# Patient Record
Sex: MALE | Race: WHITE | NOT HISPANIC OR LATINO | Employment: OTHER | URBAN - METROPOLITAN AREA
[De-identification: names, ages, dates, MRNs, and addresses within clinical notes are randomized per-mention and may not be internally consistent; named-entity substitution may affect disease eponyms.]

---

## 2017-02-09 ENCOUNTER — GENERIC CONVERSION - ENCOUNTER (OUTPATIENT)
Dept: OTHER | Facility: OTHER | Age: 72
End: 2017-02-09

## 2017-03-23 ENCOUNTER — GENERIC CONVERSION - ENCOUNTER (OUTPATIENT)
Dept: OTHER | Facility: OTHER | Age: 72
End: 2017-03-23

## 2017-04-19 ENCOUNTER — ALLSCRIPTS OFFICE VISIT (OUTPATIENT)
Dept: OTHER | Facility: OTHER | Age: 72
End: 2017-04-19

## 2017-04-25 ENCOUNTER — ALLSCRIPTS OFFICE VISIT (OUTPATIENT)
Dept: OTHER | Facility: OTHER | Age: 72
End: 2017-04-25

## 2017-04-26 ENCOUNTER — GENERIC CONVERSION - ENCOUNTER (OUTPATIENT)
Dept: OTHER | Facility: OTHER | Age: 72
End: 2017-04-26

## 2017-06-20 ENCOUNTER — ALLSCRIPTS OFFICE VISIT (OUTPATIENT)
Dept: OTHER | Facility: OTHER | Age: 72
End: 2017-06-20

## 2017-09-08 ENCOUNTER — ALLSCRIPTS OFFICE VISIT (OUTPATIENT)
Dept: OTHER | Facility: OTHER | Age: 72
End: 2017-09-08

## 2017-09-19 ENCOUNTER — ALLSCRIPTS OFFICE VISIT (OUTPATIENT)
Dept: OTHER | Facility: OTHER | Age: 72
End: 2017-09-19

## 2017-10-03 ENCOUNTER — ALLSCRIPTS OFFICE VISIT (OUTPATIENT)
Dept: OTHER | Facility: OTHER | Age: 72
End: 2017-10-03

## 2017-10-10 ENCOUNTER — GENERIC CONVERSION - ENCOUNTER (OUTPATIENT)
Dept: OTHER | Facility: OTHER | Age: 72
End: 2017-10-10

## 2017-10-10 LAB
A/G RATIO (HISTORICAL): 2.1 (CALC) (ref 1–2.5)
ALBUMIN SERPL BCP-MCNC: 4.7 G/DL (ref 3.6–5.1)
ALP SERPL-CCNC: 63 U/L (ref 40–115)
ALT SERPL W P-5'-P-CCNC: 18 U/L (ref 9–46)
AST SERPL W P-5'-P-CCNC: 19 U/L (ref 10–35)
BASOPHILS # BLD AUTO: 1 %
BASOPHILS # BLD AUTO: 61 CELLS/UL (ref 0–200)
BILIRUB SERPL-MCNC: 0.4 MG/DL (ref 0.2–1.2)
BUN SERPL-MCNC: 20 MG/DL (ref 7–25)
BUN/CREA RATIO (HISTORICAL): NORMAL (CALC) (ref 6–22)
CALCIUM (ADJUSTED FOR ALBUMIN) (HISTORICAL): 9.9 MG/DL (CALC) (ref 8.6–10.2)
CALCIUM SERPL-MCNC: 10.1 MG/DL (ref 8.6–10.3)
CHLORIDE SERPL-SCNC: 103 MMOL/L (ref 98–110)
CO2 SERPL-SCNC: 30 MMOL/L (ref 20–31)
CREAT SERPL-MCNC: 1 MG/DL (ref 0.7–1.18)
DEPRECATED RDW RBC AUTO: 12.2 % (ref 11–15)
EGFR AFRICAN AMERICAN (HISTORICAL): 86 ML/MIN/1.73M2
EGFR-AMERICAN CALC (HISTORICAL): 74 ML/MIN/1.73M2
EOSINOPHIL # BLD AUTO: 238 CELLS/UL (ref 15–500)
EOSINOPHIL # BLD AUTO: 3.9 %
EST. AVERAGE GLUCOSE BLD GHB EST-MCNC: 111 (CALC)
EST. AVERAGE GLUCOSE BLD GHB EST-MCNC: 6.2 (CALC)
GAMMA GLOBULIN (HISTORICAL): 2.2 G/DL (CALC) (ref 1.9–3.7)
GLUCOSE (HISTORICAL): 82 MG/DL (ref 65–99)
HBA1C MFR BLD HPLC: 5.5 % OF TOTAL HGB
HCT VFR BLD AUTO: 45.4 % (ref 38.5–50)
HGB BLD-MCNC: 15.4 G/DL (ref 13.2–17.1)
LYMPHOCYTES # BLD AUTO: 1043 CELLS/UL (ref 850–3900)
LYMPHOCYTES # BLD AUTO: 17.1 %
MCH RBC QN AUTO: 33.6 PG (ref 27–33)
MCHC RBC AUTO-ENTMCNC: 33.9 G/DL (ref 32–36)
MCV RBC AUTO: 98.9 FL (ref 80–100)
MONOCYTES # BLD AUTO: 549 CELLS/UL (ref 200–950)
MONOCYTES (HISTORICAL): 9 %
NEUTROPHILS # BLD AUTO: 4209 CELLS/UL (ref 1500–7800)
NEUTROPHILS # BLD AUTO: 69 %
PLATELET # BLD AUTO: 288 THOUSAND/UL (ref 140–400)
PMV BLD AUTO: 9.7 FL (ref 7.5–12.5)
POTASSIUM SERPL-SCNC: 5.1 MMOL/L (ref 3.5–5.3)
RBC # BLD AUTO: 4.59 MILLION/UL (ref 4.2–5.8)
SODIUM SERPL-SCNC: 143 MMOL/L (ref 135–146)
TOTAL PROTEIN (HISTORICAL): 6.9 G/DL (ref 6.1–8.1)
TSH SERPL DL<=0.05 MIU/L-ACNC: 2.16 MIU/L (ref 0.4–4.5)
WBC # BLD AUTO: 6.1 THOUSAND/UL (ref 3.8–10.8)

## 2017-10-17 ENCOUNTER — ALLSCRIPTS OFFICE VISIT (OUTPATIENT)
Dept: OTHER | Facility: OTHER | Age: 72
End: 2017-10-17

## 2017-10-19 ENCOUNTER — ALLSCRIPTS OFFICE VISIT (OUTPATIENT)
Dept: OTHER | Facility: OTHER | Age: 72
End: 2017-10-19

## 2017-10-20 NOTE — PROGRESS NOTES
Assessment  1  Hypothyroidism (244 9) (E03 9)   2  OCD (obsessive compulsive disorder) (300 3) (F42 9)   3  Impaired fasting glucose (790 21) (R73 01)   4  Tension type headache (339 10) (G44 209)   5  Hyperlipidemia (272 4) (E78 5)    Plan  Anxiety    · ALPRAZolam 0 25 MG Oral Tablet; TAKE 1 TABLET EVERY 8 HOURS AS  NEEDED   · FLUoxetine HCl - 40 MG Oral Capsule; Take 1 capsule twice daily  Health Maintenance    · *VB - Fall Risk Assessment  (Dx Z13 89 Screen for Neurologic Disorder);  Status:Complete;   Done: 87OXX8097 02:57PM   · *VB-Depression Screening; Status:Complete;   Done: 76SCQ0558 02:57PM  Hyperlipidemia, Hypothyroidism, Impaired fasting glucose, OCD (obsessive compulsive  disorder), Tension type headache    · (Q) COMPREHENSIVE METABOLIC PNL W/ADJUSTED CALCIUM; Status:Active; Requested for:09Apr2018;    · (Q) HEMOGLOBIN A1c WITH eAG; Status:Active; Requested for:09Apr2018;    · (Q) LIPID PANEL WITH REFLEX TO DIRECT LDL; Status:Active; Requested  for:09Apr2018;    · (Q) TSH, 3RD GENERATION W/REFLEX TO FT4; Status:Active; Requested  for:09Apr2018;   Hypothyroidism    · Levothyroxine Sodium 75 MCG Oral Tablet; TAKE 1 TABLET DAILY  PMH: Other chronic pain    · Niacin 500 MG Oral Tablet; TAKE 2 TABLETS TWICE DAILY  Tension type headache    · Butalbital-APAP-Caffeine -40 MG Oral Tablet; TAKE 1 OR 2 TABLETS  EVERY 4 HOURS AS NEEDED FOR HEADACHE  DO NOT EXCEED 6 TABLETS IN  24 HOURS  Unlinked    · Lolly 0 5-0 4 MG Oral Capsule (Dutasteride-Tamsulosin HCl); take 1 capsule  daily    Discussion/Summary    Reviewed lab in 10/1394cdkyrs5 5 normalokokprozac to 40mg bid  FU therapist regularly  refills of xanax  SE educated pt  refill of fioricet  SE educated pt  Use as less as possible  specialist as scheduled  flu shot at [facility]  2010  Got [id number] in 9/2015  3/20/2013, repeat in 5 years  urology for PSA  in 6 months with labs       Possible side effects of new medications were reviewed with the patient/guardian today  The treatment plan was reviewed with the patient/guardian  The patient/guardian understands and agrees with the treatment plan      Chief Complaint  Patient presents for a 6 month follow up  History of Present Illness  Pt is here with wife  He is on prozac 40mg daily  Feels little help  FU therapist Q 2 weeks  Would like to try higher dose  helped  He is on xanax 4/week, not everyday  Need refill  headache---He is on firocet 3-4/week which helped  Need refill  hgA1C 5 5 normalniacin daily and diet control  neurology for memory loss/dementia regularly  Short memory loss is stable  Does not take any meds for it  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  Stable  urology Dr Mirta Ford for BPH  He is on [place] which helped  opthalmology yearly  with wife and son  Does all ADL's  Still drive  recent falls  depression  The patient is being seen for follow-up of hypothyroidism of undetermined etiology  The patient reports doing well  The patient is currently asymptomatic  Associated symptoms: no depression  Medications:  the patient is adherent to his medication regimen, but-- he denies medication side effects  Review of Systems    Constitutional: No fever or chills, feels well, no tiredness, no recent weight gain or weight loss  ENT: no complaints of earache, no hearing loss, no nosebleeds, no nasal discharge, no sore throat, no hoarseness  Cardiovascular: No complaints of slow heart rate, no fast heart rate, no chest pain, no palpitations, no leg claudication, no lower extremity  Respiratory: No complaints of shortness of breath, no wheezing, no cough, no SOB on exertion, no orthopnea or PND  Gastrointestinal: No complaints of abdominal pain, no constipation, no nausea or vomiting, no diarrhea or bloody stools  Musculoskeletal: No complaints of arthralgia, no myalgias, no joint swelling or stiffness, no limb pain or swelling  Preventive Quality 65 and Older:  The patient is currently asymptomatic Symptoms Include: no recent fall       Active Problems  1  Anxiety (300 00) (F41 9)   2  Benign prostatic hypertrophy (600 00) (N40 0)   3  Cataract (366 9) (H26 9)   4  Erectile dysfunction of non-organic origin (302 72) (F52 21)   5  Hearing loss (389 9) (H91 90)   6  Hyperlipidemia (272 4) (E78 5)   7  Hypothyroidism (244 9) (E03 9)   8  Impaired fasting glucose (790 21) (R73 01)   9  Macrocytosis (289 89) (D75 89)   10  Marital relationship problem (V61 10) (Z63 0)   11  Medicare annual wellness visit, subsequent (V70 0) (Z00 00)   12  Memory loss (780 93) (R41 3)   13  Mild cognitive impairment (331 83) (G31 84)   14  Need for prophylactic vaccination and inoculation against influenza (V04 81) (Z23)   15  Need for vaccination with 13-polyvalent pneumococcal conjugate vaccine (V03 82) (Z23)   16  OCD (obsessive compulsive disorder) (300 3) (F42 9)   17  Osteoarthritis (715 90) (M19 90)   18  Primary localized osteoarthrosis of the hip, unspecified laterality (715 15) (M16 10)   19  Tension type headache (339 10) (G44 209)   20  Visit for pre-operative examination (V72 84) (F52 807)    Past Medical History  1  History of Bursitis of left hip (726 5) (M70 72)   2  History of Cervical radiculopathy (723 4) (M54 12)   3  History of abnormal weight loss (V13 89) (Z87 898)   4  History of contact dermatitis (V13 3) (Z87 2)   5  History of depression (V11 8) (Z86 59)   6  History of tension headache (V13 89) (Z87 898)   7  History of tension headache (V13 89) (Z87 898)   8  History of Nicotine Dependence - In Remission (V15 82)   9  History of Non-toxic multinodular goiter (241 1) (E04 2)    Surgical History  1  History of Cataract Surgery   2  History of Colonoscopy (Fiberoptic)   3  History of Hip Replacement   4  History of Near-Total Thyroidectomy    Family History  Father    1  Family history of Diabetes Mellitus (V18 0)   2  Family history of Lung Cancer (V16 1)  Paternal Uncle    3   Family history of Alcohol Abuse   4  Family history of Heart Disease (V17 49)   5  Family history of Heart Disease (V17 49)    Social History   · Being A Social Drinker   · Caffeine use (V49 89) (F15 90)   · Completed some college   · Former smoker (I35 25) (W10 070)   · History of Former smoker (V15 82) (R44 993)   · Marital relationship problem (V61 10) (Z63 0)   ·    · Social alcohol use (Z78 9)    Current Meds   1  ALPRAZolam 0 25 MG Oral Tablet; TAKE 1 TABLET EVERY 8 HOURS AS NEEDED; Therapy: 81ECT3218 to (Evaluate:16Oct2017)  Requested for: 19Apr2017; Last   Rx:19Apr2017 Ordered   2  Butalbital-APAP-Caffeine -40 MG Oral Tablet; TAKE 1 OR 2 TABLETS EVERY 4   HOURS AS NEEDED FOR HEADACHE  DO NOT EXCEED 6 TABLETS IN 24 HOURS; Therapy: 78MXJ3802 to (Evaluate:16Oct2017)  Requested for: 19Apr2017; Last   Rx:19Apr2017 Ordered   3  FLUoxetine HCl - 40 MG Oral Capsule; take 1 capsule daily; Therapy: 87HNP0368 to (Evaluate:18Nov2017)  Requested for: 19Sep2017; Last   Rx:45Apj4742 Ordered   4  Lolly 0 5-0 4 MG Oral Capsule; take 1 capsule daily; Therapy: (Recorded:19Oct2017) to Recorded   5  Levothyroxine Sodium 75 MCG Oral Tablet; TAKE 1 TABLET DAILY; Therapy: 56NQB9249 to (Amor Waldron)  Requested for: 19Apr2017; Last   Rx:03Apr2017 Ordered   6  Niacin 500 MG Oral Tablet; TAKE 2 TABLETS TWICE DAILY; Therapy: 41DRO6611 to Recorded    Allergies  1  Aspirin Buffered TABS    Vitals  Vital Signs    Recorded: 30WOT9464 02:28PM   Temperature 97 1 F, Tympanic   Heart Rate 64, R Radial   Pulse Quality Normal, R Radial   Respiration Quality Normal   Respiration 16   Systolic 099, LUE, Sitting   Diastolic 70, LUE, Sitting   Height 5 ft 11 in   Weight 168 lb 4 oz   BMI Calculated 23 47   BSA Calculated 1 96   Pain Scale 0     Physical Exam    Constitutional   General appearance: No acute distress, well appearing and well nourished      Pulmonary   Respiratory effort: No increased work of breathing or signs of respiratory distress  Auscultation of lungs: Clear to auscultation, equal breath sounds bilaterally, no wheezes, no rales, no rhonci  Cardiovascular   Auscultation of heart: Normal rate and rhythm, normal S1 and S2, without murmurs  Examination of extremities for edema and/or varicosities: Normal     Carotid pulses: Normal     Abdomen   Abdomen: Non-tender, no masses  Liver and spleen: No hepatomegaly or splenomegaly  Lymphatic   Palpation of lymph nodes in neck: No lymphadenopathy  Musculoskeletal   Gait and station: Normal          Results/Data  (Q) CBC (INCLUDES DIFF/PLT) (REFL) [id number] 02:20PM [place]     Test Name Result Flag Reference   WHITE BLOOD CELL COUNT 6 1 Thousand/uL  3 8-10 8   RED BLOOD CELL COUNT 4 59 Million/uL  4 20-5 80   HEMOGLOBIN 15 4 g/dL  13 2-17 1   HEMATOCRIT 45 4 %  38 5-50 0   MCV 98 9 fL  80 0-100 0   MCH 33 6 pg H 27 0-33 0   MCHC 33 9 g/dL  32 0-36 0   RDW 12 2 %  11 0-15 0   PLATELET COUNT 063 Thousand/uL  140-400   MPV 9 7 fL  7 5-12 5   ABSOLUTE NEUTROPHILS 4209 cells/uL  8930-6243   ABSOLUTE LYMPHOCYTES 1043 cells/uL  850-3900   ABSOLUTE MONOCYTES 549 cells/uL  200-950   ABSOLUTE EOSINOPHILS 238 cells/uL     ABSOLUTE BASOPHILS 61 cells/uL  0-200   NEUTROPHILS 69 %     LYMPHOCYTES 17 1 %     MONOCYTES 9 0 %     EOSINOPHILS 3 9 %     BASOPHILS 1 0 %       (Q) COMPREHENSIVE METABOLIC PNL W/ADJUSTED CALCIUM 09Oct2017 02:20PM [place]     Test Name Result Flag Reference   GLUCOSE 82 mg/dL  65-99   Fasting reference interval   UREA NITROGEN (BUN) 20 mg/dL  7-25   CREATININE 1 00 mg/dL  0 70-1 18   For patients >52years of age, the reference limit  for Creatinine is approximately 13% higher for people  identified as -American  eGFR NON-AFR   AMERICAN 74 mL/min/1 73m2  > OR = 60   eGFR AFRICAN AMERICAN 86 mL/min/1 73m2  > OR = 60   BUN/CREATININE RATIO   3-54   NOT APPLICABLE (calc)   SODIUM 143 mmol/L  135-146   POTASSIUM 5 1 mmol/L  3 5-5 3 CHLORIDE 103 mmol/L     CARBON DIOXIDE 30 mmol/L  20-31   CALCIUM 10 1 mg/dL  8 6-10 3   CALCIUM (ADJUSTED FOR$ALBUMIN) 9 9 mg/dL (calc)  8 6-10 2   PROTEIN, TOTAL 6 9 g/dL  6 1-8 1   ALBUMIN 4 7 g/dL  3 6-5 1   GLOBULIN 2 2 g/dL (calc)  1 9-3 7   ALBUMIN/GLOBULIN RATIO 2 1 (calc)  1 0-2 5   BILIRUBIN, TOTAL 0 4 mg/dL  0 2-1 2   ALKALINE PHOSPHATASE 63 U/L     AST 19 U/L  10-35   ALT 18 U/L  9-46     (Q) HEMOGLOBIN A1c WITH eAG 09Oct2017 02:20PM Miguel Bidding     Test Name Result Flag Reference   HEMOGLOBIN A1c 5 5 % of total Hgb  <5 7   For the purpose of screening for the presence of  diabetes:     <5 7%       Consistent with the absence of diabetes  5 7-6 4%    Consistent with increased risk for diabetes              (prediabetes)  > or =6 5%  Consistent with diabetes     This assay result is consistent with a decreased risk  of diabetes  Currently, no consensus exists regarding use of  hemoglobin A1c for diagnosis of diabetes in children  According to American Diabetes Association (ADA)  guidelines, hemoglobin A1c <7 0% represents optimal  control in non-pregnant diabetic patients  Different  metrics may apply to specific patient populations  Standards of Medical Care in Diabetes(ADA)     eAG (mg/dL) 111 (calc)     eAG (mmol/L) 6 2 (calc)       (Q) TSH, 3RD GENERATION W/REFLEX TO FT4 [id number] 02:20PM Miguel Bidding     Test Name Result Flag Reference   TSH W/REFLEX TO FT4 2 16 mIU/L  0 40-4 50     Future Appointments    Date/Time Provider Specialty Site   10/27/2017 11:15 AM [facility]  Verl Donning   02/07/2018 02:00 PM José Antonio Kent DO Psychiatry [address] [facility]IATR[facility]   11/07/2017 03:00 PM Miko Palma LCSW, LIZA  [facility] THERAPISTS   11/21/2017 02:00 PM Miko Palma LCSW, LIZA  [facility] THERAPISTS   12/08/2017 11:00 AM Miko Palma LCSW, LIZA  [facility] THERAPISTS   01/05/2018 02:45 PM Santos Gamboa, Mitchell Lindo [facility] ASSOC THERAPISTS     Signatures   Electronically signed by :  Deborah Man MD; Oct 19 2017  2:58PM EST                       (Author)

## 2017-10-27 ENCOUNTER — ALLSCRIPTS OFFICE VISIT (OUTPATIENT)
Dept: OTHER | Facility: OTHER | Age: 72
End: 2017-10-27

## 2017-10-28 NOTE — PROGRESS NOTES
Assessment  1  Mild cognitive impairment (331 83) (G31 84)   2  OCD (obsessive compulsive disorder) (300 3) (F42 9)    Plan  Mild cognitive impairment    · Follow-up visit in 1 year Evaluation and Treatment  Follow-up WITH Dr Leonid Kc  Status:  Complete  Done: 66EKJ6709   Ordered; For: Mild cognitive impairment; Ordered By: Oxana Garcia Performed:  Due: 92TZT9552; Last Updated By: Tarsha Sanchez; 10/27/2017 11:33:16 AM    Discussion/Summary  Discussion Summary:   Patient overall doing well  He is now in therapy to help cope with his OCD and will be following with a psychiatrist next month  His fluoxetine dose was recently increased which he feels has been helping  He denies any changes in his memory and his [address] continues to remain stable  He continues to function very well  He would like to continue yearly follow ups with our office  In the meantime he will continue to keep his mind stimulated with reading, Sod[place]o and cross words  the case and plan to Dr Ruiz Carrillo for review  Counseling Documentation With Imm: The patient, patient's family was counseled regarding instructions for management,-- prognosis,-- patient and family education,-- impressions,-- risks and benefits of treatment options  total time of encounter was 30 minutes-- and-- 17 minutes was spent counseling  Chief Complaint  Chief Complaint Free Text Note Form: Patient presents for f/u Mild cognitive impairment  History of Present Illness  HPI: Mr Gloria Mello is a 74year old male with mild cognitive deficits who presents for follow up  To review, his wife states symptoms began gradually in 2011 after he retired and have slowly worsened  She described him as always having behavioral issues, being racist and possessive  He has short and long term difficulty  His wife has handled the finances since they went into bankruptcy in 1997  He sleeps well on Tylenol PM and sometimes Xanax  He denies any issues depression or anxiety   He is on Prozac for OCD  His mother had dementia in her 80âs and had OCD  He also has a history of frontal headaches for which he takes Fiorcet about 5 times a week  MRI revealed mild atrophy with early microvascular disease and neuroquant imaging consistent with neurodegenerative process  Neuropsych testing was consistent with mild cognitive impairment with no clinically significant anxiety or depression  He had difficulty with auditory and visual vigilance  His B12 was elevated in the past    his last visit he continued to have mild cognitive impairment with no clear progression since the last visit  His main issue was his OCD and he was sent to psychiatry for evaluation and treatment  He remains on fluoxetine for compulsive tendencies and Fioricet for occasional headaches  His fluoxetine dose was recently increased by his PCP until he is seen by his psychiatrist    feels that he is overall doing well  His wife has noticed some improvement of his OCD since starting therapy and increasing his fluoxetine dose  No changes have been noted in his memory  He is still functioning very well  He sleeps well  He does snore if flat on his back but this resolves when he turns on his side  He likes to read, do Sudoku and cross word puzzles  He resides with his wife and their son  There are a number of family issues that he has been dealing with including his son dealing with alcoholism  He feels this is getting better  his ROS, FH, Sh and social history  Review of Systems  Neurological ROS:   Constitutional: no fever, no chills, no recent weight gain, no recent weight loss, no complaints of feeling tired, no changes in appetite  HEENT: hearing loss  Cardiovascular:  no chest pain or pressure, no palpitations present, the heart rate was not rapid or irregular, no swelling in the arms or legs, no poor circulation  Respiratory:  no unusual or persistant cough, no shortness of breath with or without exertion  Gastrointestinal:  no nausea, no vomiting, no diarrhea, no abdominal pain, no changes in bowel habits, no melena, no loss of bowel control  Genitourinary: incontinence,-- feelings of urinary urgency-- and-- increased frequency  Musculoskeletal:  no arthralgias, no myalgias, no immobility or loss of function, no head/neck/back pain, no pain while walking  The patient presents with complaints of skin lesion(s): (sees dermatalogist)  Psychiatric: anxiety,-- depression-- and-- mood swings  Endocrine hair loss or gain-- and-- loss of sexual ability or drive   Hematologic/Lymphatic:  no unusual bleeding, no tendency for easy bruising, no clotting skin or lumps  Neurological General: headache-- and-- snoring  Neurological Mental Status: memory problems  Neurological Cranial Nerves:  no blurry or double vision, no loss of vision, no face drooping, no facial numbness or weakness, no taste or smell loss/changes, no hearing loss or ringing, no vertigo or dizziness, no dysphagia, no slurred speech  Neurological Motor findings include:  no tremor, no twitching, no cramping(pre/post exercise), no atrophy  Neurological Coordination:  no unsteadiness, no vertigo or dizziness, no clumsiness, no problems reaching for objects  Neurological Sensory:  no numbness, no pain, no tingling, does not fall when eyes closed or taking a shower  Neurological Gait:  no difficulty walking, not falling to one side, no sensation of being pushed, has not had falls  Active Problems  1  Anxiety (300 00) (F41 9)   2  Benign prostatic hypertrophy (600 00) (N40 0)   3  Cataract (366 9) (H26 9)   4  Erectile dysfunction of non-organic origin (302 72) (F52 21)   5  Hearing loss (389 9) (H91 90)   6  Hyperlipidemia (272 4) (E78 5)   7  Hypothyroidism (244 9) (E03 9)   8  Impaired fasting glucose (790 21) (R73 01)   9  Macrocytosis (289 89) (D75 89)   10  Marital relationship problem (V61 10) (Z63 0)   11   Medicare annual wellness visit, subsequent (V70 0) (Z00 00)   12  Memory loss (780 93) (R41 3)   13  Mild cognitive impairment (331 83) (G31 84)   14  Need for prophylactic vaccination and inoculation against influenza (V04 81) (Z23)   15  Need for vaccination with 13-polyvalent pneumococcal conjugate vaccine (V03 82) (Z23)   16  OCD (obsessive compulsive disorder) (300 3) (F42 9)   17  Osteoarthritis (715 90) (M19 90)   18  Primary localized osteoarthrosis of the hip, unspecified laterality (715 15) (M16 10)   19  Tension type headache (339 10) (G44 209)   20  Visit for pre-operative examination (V72 84) (V44 218)    Past Medical History  1  History of Bursitis of left hip (726 5) (M70 72)   2  History of Cervical radiculopathy (723 4) (M54 12)   3  History of abnormal weight loss (V13 89) (Z87 898)   4  History of contact dermatitis (V13 3) (Z87 2)   5  History of depression (V11 8) (Z86 59)   6  History of tension headache (V13 89) (Z87 898)   7  History of tension headache (V13 89) (Z87 898)   8  History of Nicotine Dependence - In Remission (V15 82)   9  History of Non-toxic multinodular goiter (241 1) (E04 2)    Surgical History  1  History of Cataract Surgery   2  History of Colonoscopy (Fiberoptic)   3  History of Hip Replacement   4  History of Near-Total Thyroidectomy    Family History  Father    1  Family history of Diabetes Mellitus (V18 0)   2  Family history of Lung Cancer (V16 1)  Paternal Uncle    3  Family history of Alcohol Abuse   4  Family history of Heart Disease (V17 49)   5  Family history of Heart Disease (V17 49)    Social History   · Being A Social Drinker   · Caffeine use (V49 89) (F15 90)   · Completed some college   · Former smoker (U99 17) (E08 568)   · History of Former smoker (V15 82) (B29 203)   · Marital relationship problem (V61 10) (Z63 0)   ·    · Social alcohol use (Z78 9)    Current Meds   1  ALPRAZolam 0 25 MG Oral Tablet; TAKE 1 TABLET EVERY 8 HOURS AS NEEDED;    Therapy: 01MUJ4668 to (Evaluate:98Beb8960) Requested for: 96ZUQ5826; Last   Rx:19Oct2017 Ordered   2  Butalbital-APAP-Caffeine -40 MG Oral Tablet; TAKE 1 OR 2 TABLETS EVERY 4   HOURS AS NEEDED FOR HEADACHE  DO NOT EXCEED 6 TABLETS IN 24 HOURS; Therapy: 16IKX8314 to (Evaluate:47Xtu3188)  Requested for: 20XVO1933; Last   Rx:19Oct2017 Ordered   3  FLUoxetine HCl - 40 MG Oral Capsule; Take 1 capsule twice daily; Therapy: 39TDU0288 to (Evaluate:63Wut4739)  Requested for: 53TCE4314; Last   Rx:19Oct2017 Ordered   4  Lolly 0 5-0 4 MG Oral Capsule; take 1 capsule daily; Therapy: (Recorded:19Oct2017) to Recorded   5  Levothyroxine Sodium 75 MCG Oral Tablet; TAKE 1 TABLET DAILY; Therapy: 17OWF9431 to (Evaluate:93Lau3991)  Requested for: 37SKG4281; Last   Rx:19Oct2017 Ordered   6  Niacin 500 MG Oral Tablet; TAKE 2 TABLETS TWICE DAILY; Therapy: 26SRN6762 to Recorded    Allergies  1  Aspirin Buffered TABS    Vitals  Signs   Recorded: 49CDA8395 11:12AM   Heart Rate: 60  Systolic: 802, LUE, Sitting  Diastolic: 72, LUE, Sitting  Height: 5 ft 11 in  Weight: 164 lb 7 oz  BMI Calculated: 22 93  BSA Calculated: 1 94    Physical Exam    Constitutional   General appearance: No acute distress, well appearing and well nourished  -- (Sitting comfortably in chair  Very peasant  )   Eyes   Ophthalmoscopic examination: Vision is grossly normal  Gross visual field testing by confrontation shows no abnormalities  EOMI in both eyes  Conjunctivae clear  Eyelids normal palpebral fissures equal  Orbits exhibit normal position  No discharge from the eyes  PERRL  Musculoskeletal   Gait and station: Normal gait, stance and balance  -- (Arose without any issues  Normal stride and arm swing  )   Muscle strength: Normal strength throughout  Muscle tone: No atrophy, abnormal movements, flaccidity, cogwheeling or spasticity      Neurologic   Orientation to person, place, and time: Normal     Recent and remote memory: Abnormal     Attention span and concentration: Normal thought process and attention span  Language: Names objects, able to repeat phrases and speaks spontaneously  -- ([facility] 4/25/17 â 25/30, 10/18/16 â 25/30 )-- MOCA 26/30 - 10/27/17  Fund of knowledge: Normal vocabulary with appropriate knowledge of current events and past history  2nd cranial nerve: Normal     3rd, 4th, and 6th cranial nerves: Normal     5th cranial nerve: Normal     7th cranial nerve: Normal     8th cranial nerve: Normal     9th cranial nerve: Normal     11th cranial nerve: Normal     12th cranial nerve: Normal     Sensation: Normal     Reflexes: Normal     Coordination: Normal        Attending Note  Collaborating Physician Note: Collaborating Note: I agree with the Advanced Practitioner note        Future Appointments    Date/Time Provider Specialty Site   04/19/2018 01:00 PM Willie Lizarraga   02/07/2018 02:00 PM Melony Bethea DO Psychiatry [facility] PS[facility]CHIATRIC [facility]   11/07/2017 03:00 PM Jose Neal, [facility] THERAPISTS   11/21/2017 02:00 PM Jose Neal [facility]   12/08/2017 11:00 AM Tessy Bolaños, [facility] THERAPISTS   01/05/2018 02:45 PM Jose Neal, [facility]     Signatures   Electronically signed by : Meaghan Guo, [facility]; Oct 27 2017 11:35AM EST                       (Author)    Electronically signed by : TIM Spangler ; Oct 27 2017 11:47AM EST                       (Author)

## 2017-11-07 ENCOUNTER — ALLSCRIPTS OFFICE VISIT (OUTPATIENT)
Dept: OTHER | Facility: OTHER | Age: 72
End: 2017-11-07

## 2017-11-21 ENCOUNTER — ALLSCRIPTS OFFICE VISIT (OUTPATIENT)
Dept: OTHER | Facility: OTHER | Age: 72
End: 2017-11-21

## 2018-01-05 ENCOUNTER — ALLSCRIPTS OFFICE VISIT (OUTPATIENT)
Dept: OTHER | Facility: OTHER | Age: 73
End: 2018-01-05

## 2018-01-10 NOTE — PSYCH
Progress Note  Psychotherapy Provided [facility]: Individual Psychotherapy 50 minutes minutes provided today  Goals addressed in session:   Data: The client arrived for his session  He shared his wife has another dental appointment and he was hoping she could have attended  Nilam Le stated he and his wife have had no blow outs  He claims his son who has a drinking issue is controlled by his girlfriend  He is going to [facility] for Thanksgiving but they will have a Sunday dinner for all of their kids  Regarding his goals he said he may have some less habits and rituals  he feels his relationship with his wife is better  He feels his relationship is the same with his kids but he is seeing them  Assessment: He is on 80 mg of the Prozac for the OCD  He shared he is not having side effects  It is difficult for him to ascertain whether he has less habits or rituals  He is talking with his kids and not fighting with his wife  Plan: Continue to skill build in distress tolerance  Pain Scale and Suicide Risk [facility]: Current Pain Assessment: no pain   On a scale of 0 to 10, the patient rates current pain at 0   Current suicide risk is low   Behavioral Health Treatment Plan [facility]: Diagnosis and Treatment Plan explained to patient, patient relates understanding diagnosis and is agreeable to Treatment Plan  Assessment    1  Marital relationship problem (V61 10) (Z63 0)   2  OCD (obsessive compulsive disorder) (300 3) (F42 9)   3   Anxiety (300 00) (F41 9)    Signatures   Electronically signed by : CIRA AndradeWLCLAURIE; Nov 21 2017  2:28PM EST                       (Author)

## 2018-01-10 NOTE — PSYCH
Progress Note  Psychotherapy Provided [facility]: Family Therapy provided today  Goals addressed in session:   Data: The client and his wife arrived for his session        Signatures   Electronically signed by : CIRA ToureWLCLAURIE; Sep  8 2017  3:09PM EST                       (Author)

## 2018-01-11 NOTE — PSYCH
Behavioral Health Outpatient Intake    Referred By: Karime Stern  Intake Questions: there are no developmental disabilities  the patient does not have a hearing impairment  the patient does not have an ICM or CTT  patient is not taking injectable psychiatric medications  Employment: The patient is not employed  at retired  Emergency Contact Information:   Emergency ContactKyleigh Paiz   Phone Number: [phone]   Previous Psychiatric Treatment: He has previously been seen by a psychiatrist  20 YRS AGO  He has not been previously seen by a therapist    Mackie Mortimer History: He has served in the [employer]  He has not had combat service  He was not activated into federal active duty as a member of the [employer] or [employer]  Insurance Subscriber: Kirsten Vázquez   : 43   Employer: [employer]    Primary Insurance: MEDICARE   ID number: [id number]   Secondary Insurance: [facility] SIGNATURE 72   Phone number: [phone]   ID number: [id number]   Group number: [id number]         Presenting Problem (in patient's words): DEPRESSION  Substance Abuse: NONE  Previous Treatment: The patient has not been seen here in the past      Accepted as Patient   [address]  17 @ 4:30     Primary Care Physician: DR Linda Salinas   Electronically signed by : Mary Brooke, ; 2017  9:42AM EST                       (Author)

## 2018-01-11 NOTE — PSYCH
Progress Note  Psychotherapy Provided [facility]: Individual Psychotherapy 50 minutes minutes provided today  Goals addressed in session:   Data: The client shared his medications were increased but per his goals he shared his habits and his rituals are about the same  Regarding goal 2 he feels his relationship with his wife is the same  Goal 3 is him having a closer relationship with his children and he discussed an activity he did with them this past weekend  We discussed strategies to help him reduce his habits and his rituals  Assessment: He still does not see a big difference in his habits and rituals  He came by himself today  Plan: Continue to skill build in distress tolerance and to help him reduce his symptoms  Pain Scale and Suicide Risk [facility]: Current Pain Assessment: no pain   On a scale of 0 to 10, the patient rates current pain at 0   Current suicide risk is low   Behavioral Health Treatment Plan [address] [place] [address] [address]: Diagnosis and Treatment Plan explained to patient, patient relates understanding diagnosis and is agreeable to Treatment Plan  Assessment    1  Marital relationship problem (V61 10) (Z63 0)   2   OCD (obsessive compulsive disorder) (300 3) (F42 9)    Signatures   Electronically signed by : SERA Xie; Nov 7 2017  3:32PM EST                       (Author)    Electronically signed by : SERA Xie; Nov 7 2017  3:32PM EST                       (Author)    Electronically signed by : SERA Xie; Nov 7 2017  3:35PM EST                       (Author)

## 2018-01-12 NOTE — PROGRESS NOTES
Assessment    1  Memory loss (780 93) (R41 3)   2  History of OCD (obsessive compulsive disorder) (V11 2) (Z86 59)   3  Tension type headache (339 10) (G44 209)    Plan  Memory loss    · (1) FOLATE; Status:Active; Requested for:31May2016;    Perform:[facility]; XGO:[id number];UYUASRO; For:Memory loss; Ordered By:Keshia Ordaz;   · (1) METHYLMALONIC ACID,BLOOD; Status:Active; Requested for:31May2016;    Perform:[facility]; PWB:[id number];TBGFYRT; For:Memory loss; Ordered By:Keshia Ordaz;   · (1) VITAMIN B12; Status:Active; Requested for:31May2016;    Perform:[facility]; QXX:21CTQ9199;RRUQRVY; For:Memory loss; Ordered By:Alberto Ordaz;   · Follow-up visit in 5 months Evaluation and Treatment  Follow-up  Status: Complete   Done: 48GPI5084   Ordered; For: Memory loss; Ordered By: Kobe Garcia Performed:  Due: 56JME3401; Last Updated By: Rosalio Joyce; 5/31/2016 11:39:34 AM    Discussion/Summary  Discussion Summary:   Patient with short term memory loss  He performed well of MOCA testing other than delayed recall  Reviewed his neuropsych findings and his MRI which was consistent with a neurodegenerative process  He is still functioning very well  He finds that he has more issues if he is trying to remember to do an activity that strays from his normal routine  He does also have a history of OCD  Had a long discussion once again regarding mild cognitive impairment and dementia  Given he is functioning very well will not start any treatment  Will continue to monitor for evidence of progression with time  He was encouraged to stay more stimulated both physically and mentally  He has been on supplemental B12 for a number of years due to prior B12 deficiency  Will repeat labs to look at current values  He does continue to have tension headaches  He is not bothered by these at this time and he has significant improvement with Fioricet and would like to continue on this regimen  Forwarding the case and plan to Dr Suraj Sinha for review  Medication Side Effects Reviewed: Possible side effects of new medications were reviewed with the patient/guardian today  Patient Guardian understands agrees: The treatment plan was reviewed with the patient/guardian  The patient/guardian understands and agrees with the treatment plan   Counseling Documentation With Imm: The patient, patient's family was counseled regarding diagnostic results, prognosis, patient and family education, impressions, risks and benefits of treatment options  total time of encounter was 35 minutes and 20 minutes was spent counseling  Chief Complaint  Chief Complaint Free Text Note Form: Patient presents for memory loss follow up  He would like to review his neuropsych testing and MRI  History of Present Illness  HPI: Mr Shawna Ponce is a 66year old male with mild cognitive deficits who presents for follow up  To review, his wife states symptoms began gradually in 2011 after he retired and have slowly worsened  She described him as always having behavioral issues, being racist and possessive  He has short and long term difficulty  His wife has handled the finances since they went into bankruptcy in 1997  He sleeps well on Tylenol PM and sometimes Xanax  He denies any issues depression or anxiety  He is on Prozac for OCD  His mother had dementia in her 80's and had OCD  He also has a history of frontal headaches for which he takes Fiorcet about 5 times a week  At his initial visit he was noted to have mild cognitive deficits along with compulsive tendencies for which he takes fluoxetine  He was sent for formal neuropsych testing  He was also sent for an MRI  MRI revealed mild atrophy with early microvascular disease and neuroquant imaging consistent with neurodegenerative process  Neuropsych testing was consistent with mild cognitive impairment with no clinically significant anxiety or depression   He had difficulty with auditory and visual vigilance  He has been doing well since the last visit  He still has some trouble with his short term memory  He will notice this more when he is doing something other than his normal routine  He will need to write himself notes such as for lowering the flag over THE [facility] day  He is able to perform all of his ADLs without issues  He functions well from a day to day basis  He is able to drive without any issues  He enjoys reading, playing Hearts on the computer and doing the crosswords in the paper  He does admit that he does not get out much and enjoys staying at home  His wife has been trying to get him to go to the local gym with her  He does have OCD and has always had issues with straying from his routine  He does still have occasional headaches  He feels they are tension related  He will take Fioricet with significant improvement  He tends to use this every 1-2 weeks  They are not bothersome to him  He has had them for years  He does feel that some of it is related to decreased caffeine intake  Reviewed his ROS, FH, Sh and social history  Review of Systems  Neurological ROS:   Constitutional: no fever, no chills, no recent weight gain, no recent weight loss, no complaints of feeling tired, no changes in appetite  HEENT:  no sinus problems, not feeling congested, no blurred vision, no dryness of the eyes, no eye pain, no hearing loss, no tinnitus, no mouth sores, no sore throat, no hoarseness, no dysphagia, no masses, no bleeding  Cardiovascular:  no chest pain or pressure, no palpitations present, the heart rate was not rapid or irregular, no swelling in the arms or legs, no poor circulation  Respiratory:  no unusual or persistant cough, no shortness of breath with or without exertion  Gastrointestinal:  no nausea, no vomiting, no diarrhea, no abdominal pain, no changes in bowel habits, no melena, no loss of bowel control     Genitourinary:  no incontinence, no feelings of urinary urgency, no increase in frequency, no urinary hesitancy, no dysuria, no hematuria  Musculoskeletal:  no arthralgias, no myalgias, no immobility or loss of function, no head/neck/back pain, no pain while walking  Integumentary  no masses, no rash, no skin lesions, no livedo reticularis  Psychiatric:  no anxiety, no depression, no mood swings, no psychiatric hospitalizations, no sleep problems  Endocrine  no unusual weight loss or gain, no excessive urination, no excessive thirst, no hair loss or gain, no hot or cold intolerance, no menstrual period change or irregularity, no loss of sexual ability or drive, no erection difficulty, no nipple discharge  Hematologic/Lymphatic:  no unusual bleeding, no tendency for easy bruising, no clotting skin or lumps  Neurological General:  no headache, no nausea or vomiting, no lightheadedness, no convulsions, no blackouts, no syncope, no trauma, no photopsia, no increased sleepiness, no trouble falling asleep, no snoring, no awakening at night  Neurological Mental Status: memory problems  Neurological Cranial Nerves:  no blurry or double vision, no loss of vision, no face drooping, no facial numbness or weakness, no taste or smell loss/changes, no hearing loss or ringing, no vertigo or dizziness, no dysphagia, no slurred speech  Neurological Motor findings include:  no tremor, no twitching, no cramping(pre/post exercise), no atrophy  Neurological Coordination:  no unsteadiness, no vertigo or dizziness, no clumsiness, no problems reaching for objects  Neurological Sensory:  no numbness, no pain, no tingling, does not fall when eyes closed or taking a shower  Neurological Gait:  no difficulty walking, not falling to one side, no sensation of being pushed, has not had falls  Active Problems    1  Anxiety (300 00) (F41 9)   2  Benign prostatic hypertrophy (600 00) (N40 0)   3  Cataract (366 9) (H26 9)   4   Erectile dysfunction of non-organic origin (302 72) (F52 21)   5  Hearing loss (389 9) (H91 90)   6  History of OCD (obsessive compulsive disorder) (V11 2) (Z86 59)   7  Hyperlipidemia (272 4) (E78 5)   8  Hypothyroidism (244 9) (E03 9)   9  Impaired fasting glucose (790 21) (R73 01)   10  Macrocytosis (289 89) (D75 89)   11  Memory loss (780 93) (R41 3)   12  Need for prophylactic vaccination and inoculation against influenza (V04 81) (Z23)   13  Need for vaccination with 13-polyvalent pneumococcal conjugate vaccine (V03 82) (Z23)   14  Osteoarthritis (715 90) (M19 90)   15  Other chronic pain (338 29) (G89 29)   16  Primary localized osteoarthrosis of the hip, unspecified laterality (715 15) (M16 10)   17  Tension type headache (339 10) (G44 209)   18  Visit for pre-operative examination (V72 84) (X95 669)    Past Medical History    1  History of Bursitis of left hip (726 5) (M70 72)   2  History of Cervical radiculopathy (723 4) (M54 12)   3  History of abnormal weight loss (V13 89) (Z87 898)   4  History of contact dermatitis (V13 3) (Z87 2)   5  History of depression (V11 8) (Z86 59)   6  History of tension headache (V13 89) (Z87 898)   7  History of tension headache (V13 89) (Z87 898)   8  History of Nicotine Dependence - In Remission (V15 82)   9  History of Non-toxic multinodular goiter (241 1) (E04 2)    Surgical History    1  History of Cataract Surgery   2  History of Colonoscopy (Fiberoptic)   3  History of Hip Replacement   4  History of Near-Total Thyroidectomy    Family History  Father    1  Family history of Diabetes Mellitus (V18 0)   2  Family history of Lung Cancer (V16 1)  Paternal Uncle    3  Family history of Alcohol Abuse   4  Family history of Heart Disease (V17 49)   5   Family history of Heart Disease (V17 49)    Social History    · Being A Social Drinker   · Caffeine use (V49 89) (F15 90)   · Completed some college   · Former smoker (H45 33) (T49 638)   · History of Former smoker (J60 87) (I04 514)   ·    · Social alcohol use (Z78 9)    Current Meds   1  ALPRAZolam 0 25 MG Oral Tablet; TAKE 1 TABLET EVERY 8 HOURS AS NEEDED; Therapy: 95TKR8774 to (Jaz Willie)  Requested for: 28EBK0149; Last   Rx:29Mar2016 Ordered   2  Butalbital-APAP-Caffeine -40 MG Oral Tablet; TAKE 1 OR 2 TABLETS EVERY 4   HOURS AS NEEDED FOR HEADACHE  DO NOT EXCEED 6 TABLETS IN 24 HOURS; Therapy: 16TYC6481 to (Jaz Willie)  Requested for: 47TYE9488; Last   Rx:29Mar2016 Ordered   3  FLUoxetine HCl - 20 MG Oral Capsule; TAKE 1 CAPSULE DAILY; Therapy: 53VDP2324 to (Jaz Willie)  Requested for: 44GMY1787; Last   Rx:29Mar2016 Ordered   4  Lolly 0 5-0 4 MG Oral Capsule; take 1 capsule daily; Therapy: (Recorded:14Egb4000) to Recorded   5  Levothyroxine Sodium 75 MCG Oral Tablet; TAKE 1 TABLET DAILY; Therapy: 82LDB4103 to (Jaz Willie)  Requested for: 19BYT4608; Last   Rx:29Mar2016 Ordered   6  Metamucil POWD; take 1 packet daily; Therapy: (Recorded:30Whf6641) to Recorded   7  Niacin 500 MG Oral Tablet; TAKE 2 TABLETS TWICE DAILY; Therapy: 22WOB4516 to Recorded   8  Stool Softener TABS; TAKE 1 TABLET DAILY AS DIRECTED; Therapy: (Recorded:73Uwz6264) to Recorded    Allergies    1  Aspirin Buffered TABS    Vitals  Signs [Data Includes: Current Encounter]   Recorded: 72AZB6628 11:12AM   Heart Rate: 60  Respiration: 14  Systolic: 719  Diastolic: 76  Weight: 960 lb 6 oz  BMI Calculated: 23 34  BSA Calculated: 1 96    Physical Exam    Constitutional   General appearance: No acute distress, well appearing and well nourished  (Sitting comfortably in chair  Very peasant  )   Eyes   Ophthalmoscopic examination: Vision is grossly normal  Gross visual field testing by confrontation shows no abnormalities  EOMI in both eyes  Conjunctivae clear  Eyelids normal palpebral fissures equal  Orbits exhibit normal position  No discharge from the eyes  PERRL  Musculoskeletal   Gait and station: Normal gait, stance and balance  (Arose without any issues  Normal stride and arm swing  )   Muscle strength: Normal strength throughout  Muscle tone: No atrophy, abnormal movements, flaccidity, cogwheeling or spasticity  Neurologic   Orientation to person, place, and time: Normal     Recent and remote memory: Abnormal     Attention span and concentration: Normal thought process and attention span  Language: Names objects, able to repeat phrases and speaks spontaneously  ([address] [place] [address], Ne 6/9/15 - 24/30 ) MOCA 25/30 - 5/31/16  Fund of knowledge: Normal vocabulary with appropriate knowledge of current events and past history  2nd cranial nerve: Normal     3rd, 4th, and 6th cranial nerves: Normal     5th cranial nerve: Normal     7th cranial nerve: Normal     8th cranial nerve: Normal     9th cranial nerve: Normal     11th cranial nerve: Normal     12th cranial nerve: Normal     Sensation: Normal     Reflexes: Normal     Coordination: Normal        Results/Data  Results   * MRI Brain W/O Contrast [id number] 01:01PM Mustapha [place]     Test Name Result Flag Reference   MRI Brain W/O (Report)     [address] [facility];;[place];[id number]   06/16/2015 1400   06/16/2015 1500   N/A     MRI BRAIN WITH NEUROQUANT IMAGING, WITHOUT CONTRAST     INDICATION- Headaches  Memory loss  780 93      COMPARISON-  None  TECHNIQUE- Sagittal T1, axial T2, axial FLAIR, axial T1, axial   gradient imaging and axial diffusion imaging  Sagittal Neuroquant   imaging with age related atrophy and general morphology report created  IMAGE QUALITY- Diagnostic  FINDINGS-     BRAIN PARENCHYMA- No mass, mass effect or midline shift  Minimal   cerebral atrophy  A few scattered white matter lesions are seen which   are nonspecific but may represent early chronic microvascular ischemic   disease  Diffusion imaging is unremarkable with no evidence of acute   ischemia  No hemorrhage  Normal basilar cisterns   Brainstem and cerebellum demonstrate normal signal      Neuroquant imaging demonstrates age related atrophy report showing   decreased hippocampal volumes, 19th percentile compared to aged matched   controls and increased size of the inferior lateral ventricles, 88th   percentile compared to aged matched controls  This pattern is   consistent with mesial temporal lobe focus neurodegeneration  VENTRICLES- Slightly enlarged, consistent with the mild degree of   atrophy  SELLA AND PITUITARY GLAND- Normal      ORBITS- Normal      PARANASAL SINUSES- Normal      VASCULATURE- Evaluation of the major intracranial vasculature   demonstrates appropriate flow voids  CALVARIUM AND SKULL BASE- Normal      EXTRACRANIAL SOFT TISSUES- Normal      IMPRESSION-     1  Neuroquant imaging demonstrates age related atrophy report as   described above consistent with mesial temporal lobe focused   neurodegeneration  2  Mild atrophy  Minimal white matter change may represent early   chronic microvascular ischemic disease  Transcribed on- [address] , RAD DO   [place] Radiologist- [address], RAD DO   Electronically [facility] DO   Released Date Time- 06/16/15 1615   ------------------------------------------------------------------------------   9381^GENE MIROSLAVA BRANCH   9381^GEORGIE BRANCH     Attending Note  Collaborating Physician Note: Collaborating Note: I agree with the Advanced Practitioner note   I discussed the case with the Advanced Practitioner and reviewed the AP note      Future Appointments    Date/Time Provider Specialty Site   09/29/2016 01:00 PM Hemal Brewer [address]   11/01/2016 02:15 PM Inez Birch [facility] Neurology [place] [facility]     Signatures   Electronically signed by : Sada Middleton [facility]; May 31 2016 12:43PM EST                       (Author)    Electronically signed by : Arabella Welsh MD; Jun 6 2016  6:49AM EST (Author)

## 2018-01-12 NOTE — PSYCH
Progress Note  Psychotherapy Provided [facility]: Individual Psychotherapy 50 minutes minutes provided today  Goals addressed in session:   Data: The client Nilam Le arrived for his session  He discussed his 43year old alcoholic son who lives with his wife and he and who per him causes issues  The son is an alcoholic and has had 4 DUI's  They allow him to drink there  We discussed they are enabling him  He discussed this his entire session  Assessment: We discussed strategies to help him and his wife deal with this dysfunction  He feels all is well with him and his wife but he tends to minimize these issues  Plan: Continue to help him deal with this issue  Pain Scale and Suicide Risk [facility]: Current Pain Assessment: no pain   On a scale of 0 to 10, the patient rates current pain at 0   Current suicide risk is low   Behavioral Health Treatment Plan [facility]: Diagnosis and Treatment Plan explained to patient, patient relates understanding diagnosis and is agreeable to Treatment Plan  Assessment    1  History of OCD (obsessive compulsive disorder) (V11 2) (Z86 59)   2  Marital relationship problem (V61 10) (Z63 0)   3  Mild cognitive impairment (331 83) (G31 84)   4  Anxiety (300 00) (F41 9)   5   Memory loss (780 93) (R41 3)    Signatures   Electronically signed by : SERA Andrade; Sep 19 2017  5:43PM EST                       (Author)    Electronically signed by : SERA Andrade; Sep 19 2017  6:27PM EST                       (Author)

## 2018-01-12 NOTE — PSYCH
History of Present Illness    Pre-morbid level of function and History of Present Illness:  I have OCD and severe ritualistic behavior  Reason for evaluation and partial hospitalization as an alternative to inpatient hospitalization: N/A   will see patient as an outpatient  Previous Psychiatric/psychological treatment/year: 20 to 25 years  Current Psychiatrist/Therapist: sees the undersigned  Outpatient and/or Partial and Other [employer] Used ([facility], [facility], [facility]): Inpatient: N/A, Outpatient: 25 years ago and Partial: N/A  Problem Assessment:   SOCIAL/VOCATION:   Family Constellation (include parents, relationship with each and pertinent Psych/Medical History):   Spouse: Misha   Children: 4 children 2 in the area and 2 aren't  Domestic Violence: No past history of domestic violence  The patient is not currently experiencing domestic violence  There is not suspected domestic violence  There is no history of child abuse  There is no history of sexual abuse  Additional Comments related to family/relationships/peer support: family is supportive  School or Work History (strengths/limitations/needs: strengths-easy goingCindy  His highest grade level achieved was     history includes [employer]  Financial status includes ok  LEISURE ASSESSMENT (Include past and present hobbies/interests and level of involvement (Ex: Group/Club Affiliations): computer cards  His primary language is  [place]  Preferred language is english  Religions affiliations and level of involvement - Uatsdin   Spirituality and karina have helped him cope with difficult situations in his life  FUNCTIONAL STATUS: There has been a recent change in the patient's ability to do the following:  He does not need [employer]  Level of Assistance Needed/By Whom?: n/a  The patient learns best by  reading listening     SUBSTANCE ABUSE ASSESSMENT: no current substance abuse and no past substance abuse  No previous detox/rehab treatment  HEALTH ASSESSMENT: He has not lost 10 lbs or more in the last 6 months without trying  He has not had decreased appetite for 5 days or more  He has not gained 10 lbs or more in the last 6 months without trying  no nausea  no vomiting  no diarrhea     not referred to PCP  Current PCP: family MD in [place]  PCP not notified  LEGAL: No Mental Health Advance Directive or Power of  on file  He does not want an information packet about Mental Health Advance Directives  The following ratings are based on my interview(s) with with patient and his wife  Risk of Harm to Self:   Demographic risk factors include , alaskan, or native [place] and male  Historical Risk Factors include: chronic psychiatric problems  Recent Specific Risk Factors include: feelings of guilt or self blame and worries about finances or work  These risk factors presented within the last no risk of self harm  Risk of Harm to Others:   Demographic Risk Factors include: n/a  Recent Specific Risk Factors include: multiple stressors  The following interventions are recommended: referral to to practice MD       Review of Systems  anxiety, hostility, compulsive behavior, unusual behavior, interpersonal relationship problems, emotional problems/concerns, sleep disturbances and decreased functioning ability, but no depression, no euphoria, no emotional lability, not suidical, no impulsive behavior, no violent behavior, no disturbing or unusual thoughts, feelings, or sensations, no unreasonable or irrational fears, no magical thinking, not having fantasies, no personality change and no character deficiency  Constitutional: No fever or chills, feels well, no tiredness, no recent weight gain or weight loss  Eyes: bilateral cataracts removed  ENT: constant throat clearing     Cardiovascular: No complaints of slow heart rate, no fast heart rate, no chest pain, no palpitations, no leg claudication, no lower extremity  Respiratory: No complaints of shortness of breath, no wheezing, no cough, no SOB on exertion, no orthopnea or PND  Gastrointestinal: No complaints of abdominal pain, no constipation, no nausea or vomiting, no diarrhea or bloody stools  Genitourinary: enlarged prostate  Musculoskeletal: l hip replacement  Integumentary: had shingles, skin cancer  Neurological: short term memory  Psychiatric: anxiety, sleep disturbances and emotional problems, but not suicidal, no personality change and no depression  Endocrine: pre diabetic  Hematologic/Lymphatic: No complaints of swollen glands, no swollen glands in the neck, does not bleed easily, no easy bruising  ROS reviewed  Active Problems    1  Anxiety (300 00) (F41 9)   2  Benign prostatic hypertrophy (600 00) (N40 0)   3  Cataract (366 9) (H26 9)   4  Erectile dysfunction of non-organic origin (302 72) (F52 21)   5  Hearing loss (389 9) (H91 90)   6  History of OCD (obsessive compulsive disorder) (V11 2) (Z86 59)   7  Hyperlipidemia (272 4) (E78 5)   8  Hypothyroidism (244 9) (E03 9)   9  Impaired fasting glucose (790 21) (R73 01)   10  Macrocytosis (289 89) (D75 89)   11  Medicare annual wellness visit, subsequent (V70 0) (Z00 00)   12  Memory loss (780 93) (R41 3)   13  Mild cognitive impairment (331 83) (G31 84)   14  Need for prophylactic vaccination and inoculation against influenza (V04 81) (Z23)   15  Need for vaccination with 13-polyvalent pneumococcal conjugate vaccine (V03 82) (Z23)   16  Osteoarthritis (715 90) (M19 90)   17  Other chronic pain (338 29) (G89 29)   18  Primary localized osteoarthrosis of the hip, unspecified laterality (715 15) (M16 10)   19  Tension type headache (339 10) (G44 209)   20  Visit for pre-operative examination (V72 84) (F66 115)    Past Medical History    1  History of Bursitis of left hip (726 5) (M70 72)   2   History of Cervical radiculopathy (723 4) (M54 12)   3  History of abnormal weight loss (V13 89) (Z87 898)   4  History of contact dermatitis (V13 3) (Z87 2)   5  History of depression (V11 8) (Z86 59)   6  History of tension headache (V13 89) (Z87 898)   7  History of tension headache (V13 89) (Z87 898)   8  History of Nicotine Dependence - In Remission (V15 82)   9  History of Non-toxic multinodular goiter (241 1) (E04 2)    The active problems and past medical history were reviewed and updated today  Past Psychiatric History    Past Psychiatric History: 25 years ago saw psychiatrist and therapist         Surgical History    The surgical history was reviewed and updated today  Family Psych History  Father    1  Family history of Diabetes Mellitus (V18 0)   2  Family history of Lung Cancer (V16 1)  Paternal Uncle    3  Family history of Alcohol Abuse   4  Family history of Heart Disease (V17 49)   5  Family history of Heart Disease (V17 49)    The family history was reviewed and updated today  Substance Abuse Hx    Substance Abuse History: N/A  Social History    · Being A Social Drinker   · Caffeine use (V49 89) (F15 90)   · Completed some college   · Former smoker (P16 62) (S30 675)   · History of Former smoker (V15 82) (T24 138)   · Marital relationship problem (V61 10) (Z63 0)   ·    · Social alcohol use (Z78 9)  The social history was reviewed and updated today  The social history was reviewed and is unchanged  Current Meds   1  ALPRAZolam 0 25 MG Oral Tablet; TAKE 1 TABLET EVERY 8 HOURS AS NEEDED; Therapy: [id number] to (Evaluate:16Oct2017)  Requested for: 19Apr2017; Last   Rx:19Apr2017 Ordered   2  Butalbital-APAP-Caffeine -40 MG Oral Tablet; TAKE 1 OR 2 TABLETS EVERY 4   HOURS AS NEEDED FOR HEADACHE  DO NOT EXCEED 6 TABLETS IN 24 HOURS; Therapy: 02ZFN0901 to (Evaluate:16Oct2017)  Requested for: 19Apr2017; Last   Rx:19Apr2017 Ordered   3   FLUoxetine HCl - 20 MG Oral Capsule; TAKE 1 CAPSULE DAILY; Therapy: [id number] to (Desmond Escobedo)  Requested for: 19Apr2017; Last   Rx:03Apr2017 Ordered   4  Lolly 0 5-0 4 MG Oral Capsule (Dutasteride-Tamsulosin HCl); take 1 capsule daily; Therapy: (Recorded:19Apr2017) to Recorded   5  Levothyroxine Sodium 75 MCG Oral Tablet; TAKE 1 TABLET DAILY; Therapy: [id number] to (Desmond Escobedo)  Requested for: 19Apr2017; Last   Rx:03Apr2017 Ordered   6  Metamucil POWD; take 1 packet daily; Therapy: (Recorded:19Apr2017) to Recorded   7  Niacin 500 MG Oral Tablet; TAKE 2 TABLETS TWICE DAILY; Therapy: 13LXT5493 to Recorded   8  Stool Softener TABS; TAKE 1 TABLET DAILY AS DIRECTED; Therapy: (Recorded:95Ybm0045) to Recorded    The medication list was reviewed and updated today  Allergies    1  Aspirin Buffered TABS    Physical Exam    Appearance: was calm and cooperative, adequate hygiene and grooming and good eye contact  Observed mood: mood appropriate  Observed mood: affect appropriate  Speech: a normal rate  Thought processes: coherent/organized  Hallucinations: no hallucinations present  Thought Content: no delusions  Abnormal Thoughts: The patient has no suicidal thoughts  Orientation: The patient is oriented to person, place and time, oriented to person, oriented to place and oriented to time  Recent and Remote Memory: short term memory impaired and long term memory intact  Attention Span And Concentration: concentration impaired  Insight: Poor insight  Judgment: His judgment was limited  Muscle Strength And Tone  Muscle strength and tone were normal  Normal gait and station  Language: no difficulty naming common objects, no difficulty repeating a phrase and no difficulty writing a sentence  Fund of knowledge: Patient displays adequate knowledge of current events, adequate fund of knowledge regarding past history and adequate fund of knowledge regarding vocabulary  The patient is experiencing no localized pain   On a scale of 0 - 10 the pain severity is a 0  DSM    Provisional Diagnosis: severe OCD  Marital conflict  Assessment    1  OCD (obsessive compulsive disorder) (300 3) (F42 9)   2   Marital relationship problem (V61 10) (Z63 0)    Future Appointments    Date/Time Provider Specialty Site   10/19/2017 02:20 PM Mercedes Gomez MD Family Medicine [address] [place] [address]   10/27/2017 11:15 AM [place], [facility] Neurology [address]     Signatures   Electronically signed by : SERA May; Jun 20 2017  5:20PM EST                       (Author)    Electronically signed by : TIM Brown ; Jun 21 2017  9:43AM EST                       (Author)

## 2018-01-12 NOTE — PSYCH
Progress Note  Psychotherapy Provided [facility]: Family Therapy provided today  Goals addressed in session:   Data: Patient and the wife arrived for his session  He is on 40 mg of Prozac and will see his family MD next week  The wife is a RN and will speak to the MD about titrating his medications  He has multiple habits and rituals  His wife states she should have left years ago  he dictates everything about windows being open and about thremostats the way they are set  He dictates everything  His wife claims that he does nothing she wants him to do  However she has enabled over the years  She claims if she turns things differently then he has he will turn it back  His second goal of having a better relationship with his wife is not currently happening  Regarding his third goal of getting closer with his kids he has not made progress in this area  Assessment: This client has severe OCD, is very controlling and he tries to dictate everything  The client still has habits and rituals, has issues with his wife and he is not closer with his wife  Plan: Continue to help him decrease his OCD habits and to help their relationship  Pain Scale and Suicide Risk [facility]: Current Pain Assessment: no pain   On a scale of 0 to 10, the patient rates current pain at 0   Current suicide risk is low   Behavioral Health Treatment Plan [facility]: Diagnosis and Treatment Plan explained to patient, patient relates understanding diagnosis and is agreeable to Treatment Plan  Assessment    1  History of OCD (obsessive compulsive disorder) (V11 2) (Z86 59)   2   Marital relationship problem (V61 10) (Z63 0)    Signatures   Electronically signed by : SERA Pittman; Oct 17 2017  5:28PM EST                       (Author)    Electronically signed by : SERA Pittman; Oct 17 2017  5:29PM EST                       (Author)    Electronically signed by : SERA Pittman; Oct 17 2017  5:40PM EST (Author)    Electronically signed by : Lg Mayorga, ACSWLCSW; Oct 18 2017 12:24PM EST                       (Author)

## 2018-01-13 VITALS
HEIGHT: 71 IN | WEIGHT: 164.44 LBS | HEART RATE: 60 BPM | DIASTOLIC BLOOD PRESSURE: 72 MMHG | BODY MASS INDEX: 23.02 KG/M2 | SYSTOLIC BLOOD PRESSURE: 130 MMHG

## 2018-01-13 VITALS
OXYGEN SATURATION: 96 % | WEIGHT: 163.19 LBS | RESPIRATION RATE: 14 BRPM | SYSTOLIC BLOOD PRESSURE: 149 MMHG | BODY MASS INDEX: 22.76 KG/M2 | HEART RATE: 55 BPM | DIASTOLIC BLOOD PRESSURE: 66 MMHG

## 2018-01-13 NOTE — RESULT NOTES
Message   DW pt on 3/29/2016 OV  Verified Results  (Q) COMPREHENSIVE METABOLIC PNL W/ADJUSTED CALCIUM [id number] 10:36AM Sunday [place]     Test Name Result Flag Reference   GLUCOSE 90 mg/dL  65-99   Fasting reference interval   UREA NITROGEN (BUN) 21 mg/dL  7-25   CREATININE 0 92 mg/dL  0 70-1 18   For patients >52years of age, the reference limit  for Creatinine is approximately 13% higher for people  identified as -American  eGFR NON-AFR  AMERICAN 82 mL/min/1 73m2  > OR = 60   eGFR AFRICAN AMERICAN 95 mL/min/1 73m2  > OR = 60   BUN/CREATININE RATIO   5-32   NOT APPLICABLE (calc)   SODIUM 137 mmol/L  135-146   POTASSIUM 4 6 mmol/L  3 5-5 3   CHLORIDE 102 mmol/L     CARBON DIOXIDE 28 mmol/L  19-30   CALCIUM 9 7 mg/dL  8 6-10 3   CALCIUM (ADJUSTED FOR$ALBUMIN) 9 6 mg/dL (calc)  8 6-10 2   PROTEIN, TOTAL 6 5 g/dL  6 1-8 1   ALBUMIN 4 5 g/dL  3 6-5 1   GLOBULIN 2 0 g/dL (calc)  1 9-3 7   ALBUMIN/GLOBULIN RATIO 2 3 (calc)  1 0-2 5   BILIRUBIN, TOTAL 0 6 mg/dL  0 2-1 2   ALKALINE PHOSPHATASE 44 U/L     AST 12 U/L  10-35   ALT 14 U/L  9-46     (Q) LIPID PANEL WITH REFLEX TO DIRECT LDL [id number] 10:36AM [employer]     Test Name Result Flag Reference   CHOLESTEROL, TOTAL 178 mg/dL  125-200   HDL CHOLESTEROL 46 mg/dL  > OR = 40   TRIGLICERIDES 405 mg/dL  <150   LDL-CHOLESTEROL 104 mg/dL (calc)  <130   Desirable range <100 mg/dL for patients with CHD or  diabetes and <70 mg/dL for diabetic patients with  known heart disease  CHOL/HDLC RATIO 3 9 (calc)  < OR = 5 0   NON HDL CHOLESTEROL 132 mg/dL (calc)     Target for non-HDL cholesterol is 30 mg/dL higher than   LDL cholesterol target

## 2018-01-14 VITALS
BODY MASS INDEX: 23.63 KG/M2 | RESPIRATION RATE: 12 BRPM | TEMPERATURE: 97.2 F | HEIGHT: 71 IN | DIASTOLIC BLOOD PRESSURE: 80 MMHG | SYSTOLIC BLOOD PRESSURE: 140 MMHG | WEIGHT: 168.8 LBS | HEART RATE: 72 BPM

## 2018-01-14 VITALS
RESPIRATION RATE: 16 BRPM | HEART RATE: 64 BPM | BODY MASS INDEX: 23.56 KG/M2 | WEIGHT: 168.25 LBS | SYSTOLIC BLOOD PRESSURE: 146 MMHG | TEMPERATURE: 97.1 F | DIASTOLIC BLOOD PRESSURE: 70 MMHG | HEIGHT: 71 IN

## 2018-01-14 NOTE — RESULT NOTES
Message   DW pt on 3/30/2017 OV  Verified Results  (Q) COMPREHENSIVE METABOLIC PNL W/ADJUSTED CALCIUM [id number] 10:33AM Ruth Connolly     Test Name Result Flag Reference   GLUCOSE 93 mg/dL  65-99   Fasting reference interval   UREA NITROGEN (BUN) 17 mg/dL  7-25   CREATININE 0 92 mg/dL  0 70-1 18   For patients >52years of age, the reference limit  for Creatinine is approximately 13% higher for people  identified as -American  eGFR NON-AFR  AMERICAN 82 mL/min/1 73m2  > OR = 60   eGFR AFRICAN AMERICAN 95 mL/min/1 73m2  > OR = 60   BUN/CREATININE RATIO   4-59   NOT APPLICABLE (calc)   SODIUM 140 mmol/L  135-146   POTASSIUM 4 8 mmol/L  3 5-5 3   CHLORIDE 104 mmol/L     CARBON DIOXIDE 31 mmol/L  20-31   CALCIUM 9 9 mg/dL  8 6-10 3   CALCIUM (ADJUSTED FOR$ALBUMIN) 9 9 mg/dL (calc)  8 6-10 2   PROTEIN, TOTAL 6 5 g/dL  6 1-8 1   ALBUMIN 4 4 g/dL  3 6-5 1   GLOBULIN 2 1 g/dL (calc)  1 9-3 7   ALBUMIN/GLOBULIN RATIO 2 1 (calc)  1 0-2 5   BILIRUBIN, TOTAL 0 4 mg/dL  0 2-1 2   ALKALINE PHOSPHATASE 54 U/L     AST 13 U/L  10-35   ALT 16 U/L  9-46     (Q) HEMOGLOBIN A1c WITH eAG 22Mar2017 10:33AM Ruth Connolly   REPORT COMMENT:  FASTING:YES     Test Name Result Flag Reference   HEMOGLOBIN A1c 5 5 % of total Hgb  <5 7   According to ADA guidelines, hemoglobin A1c <7 0%  represents optimal control in non-pregnant diabetic  patients  Different metrics may apply to specific  patient populations  Standards of Medical Care in    Diabetes Care  2013;36:s11-s66     For the purpose of screening for the presence of  diabetes  <5 7%       Consistent with the absence of diabetes  5 7-6 4%    Consistent with increased risk for diabetes              (prediabetes)  >or=6 5%    Consistent with diabetes     This assay result is consistent with a decreased risk  of diabetes  Currently, no consensus exists for use of hemoglobin  A1c for diagnosis of diabetes for children     eAG (mg/dL) 111 (calc)     eAG (mmol/L) 6 2 (calc)       (Q) LIPID PANEL WITH REFLEX TO DIRECT LDL [id number] 10:33AM Hilda Rocher     Test Name Result Flag Reference   CHOLESTEROL, TOTAL 175 mg/dL  125-200   HDL CHOLESTEROL 50 mg/dL  > OR = 40   TRIGLICERIDES 187 mg/dL  <150   LDL-CHOLESTEROL 101 mg/dL (calc)  <130   Desirable range <100 mg/dL for patients with CHD or  diabetes and <70 mg/dL for diabetic patients with  known heart disease  CHOL/HDLC RATIO 3 5 (calc)  < OR = 5 0   NON HDL CHOLESTEROL 125 mg/dL (calc)     Target for non-HDL cholesterol is 30 mg/dL higher than   LDL cholesterol target       (Q) TSH, 3RD GENERATION W/REFLEX TO FT4 22Mar2017 10:33AM [place]     Test Name Result Flag Reference   TSH W/REFLEX TO FT4 2 84 mIU/L  0 40-4 50

## 2018-01-14 NOTE — RESULT NOTES
Verified Results  (Q) CBC (INCLUDES DIFF/PLT) (REFL) [id number] 02:20PM Sunday [place]     Test Name Result Flag Reference   WHITE BLOOD CELL COUNT 6 1 Thousand/uL  3 8-10 8   RED BLOOD CELL COUNT 4 59 Million/uL  4 20-5 80   HEMOGLOBIN 15 4 g/dL  13 2-17 1   HEMATOCRIT 45 4 %  38 5-50 0   MCV 98 9 fL  80 0-100 0   MCH 33 6 pg H 27 0-33 0   MCHC 33 9 g/dL  32 0-36 0   RDW 12 2 %  11 0-15 0   PLATELET COUNT 491 Thousand/uL  140-400   MPV 9 7 fL  7 5-12 5   ABSOLUTE NEUTROPHILS 4209 cells/uL  1468-3357   ABSOLUTE LYMPHOCYTES 1043 cells/uL  850-3900   ABSOLUTE MONOCYTES 549 cells/uL  200-950   ABSOLUTE EOSINOPHILS 238 cells/uL     ABSOLUTE BASOPHILS 61 cells/uL  0-200   NEUTROPHILS 69 %     LYMPHOCYTES 17 1 %     MONOCYTES 9 0 %     EOSINOPHILS 3 9 %     BASOPHILS 1 0 %       (Q) COMPREHENSIVE METABOLIC PNL W/ADJUSTED CALCIUM 09Oct2017 02:20PM Sunday [place]     Test Name Result Flag Reference   GLUCOSE 82 mg/dL  65-99   Fasting reference interval   UREA NITROGEN (BUN) 20 mg/dL  7-25   CREATININE 1 00 mg/dL  0 70-1 18   For patients >52years of age, the reference limit  for Creatinine is approximately 13% higher for people  identified as -American  eGFR NON-AFR   AMERICAN 74 mL/min/1 73m2  > OR = 60   eGFR AFRICAN AMERICAN 86 mL/min/1 73m2  > OR = 60   BUN/CREATININE RATIO   0-93   NOT APPLICABLE (calc)   SODIUM 143 mmol/L  135-146   POTASSIUM 5 1 mmol/L  3 5-5 3   CHLORIDE 103 mmol/L     CARBON DIOXIDE 30 mmol/L  20-31   CALCIUM 10 1 mg/dL  8 6-10 3   CALCIUM (ADJUSTED FOR$ALBUMIN) 9 9 mg/dL (calc)  8 6-10 2   PROTEIN, TOTAL 6 9 g/dL  6 1-8 1   ALBUMIN 4 7 g/dL  3 6-5 1   GLOBULIN 2 2 g/dL (calc)  1 9-3 7   ALBUMIN/GLOBULIN RATIO 2 1 (calc)  1 0-2 5   BILIRUBIN, TOTAL 0 4 mg/dL  0 2-1 2   ALKALINE PHOSPHATASE 63 U/L     AST 19 U/L  10-35   ALT 18 U/L  9-46     (Q) HEMOGLOBIN A1c WITH eAG [id number] 02:20PM Sunday [place]     Test Name Result Flag Reference   HEMOGLOBIN A1c 5 5 % of total Hgb  <5 7   For the purpose of screening for the presence of  diabetes:     <5 7%       Consistent with the absence of diabetes  5 7-6 4%    Consistent with increased risk for diabetes              (prediabetes)  > or =6 5%  Consistent with diabetes     This assay result is consistent with a decreased risk  of diabetes  Currently, no consensus exists regarding use of  hemoglobin A1c for diagnosis of diabetes in children  According to American Diabetes Association (ADA)  guidelines, hemoglobin A1c <7 0% represents optimal  control in non-pregnant diabetic patients  Different  metrics may apply to specific patient populations  Standards of Medical Care in Diabetes(ADA)     eAG (mg/dL) 111 (calc)     eAG (mmol/L) 6 2 (calc)       (Q) TSH, 3RD GENERATION W/REFLEX TO FT4 [id number] 02:20PM Gisselle Beaulieu     Test Name Result Flag Reference   TSH W/REFLEX TO FT4 2 16 mIU/L  0 40-4 50

## 2018-01-15 NOTE — PSYCH
Treatment Plan Tracking    #1 Treatment Plan not completed within required time limits due to: Other: there was no time during the initial assessment to complete the recovery plan        #2 Treatment Plan not completed within required time limits due to: Other: during his last session he and his wife wanted to talk and this was the first session by himself and he wanted to talk  We will do it next time           Signatures   Electronically signed by : SERA Nunes; Jun 20 2017  5:26PM EST                       (Author)    Electronically signed by : SERA Nunes; Sep 19 2017  6:17PM EST                       (Author)

## 2018-01-15 NOTE — RESULT NOTES
Message   DW pt on 9/29/2016 OV  Verified Results  (Q) CBC (INCLUDES DIFF/PLT) (REFL) 19Sep2016 10:04AM Carole Affine     Test Name Result Flag Reference   WHITE BLOOD CELL COUNT 5 0 Thousand/uL  3 8-10 8   RED BLOOD CELL COUNT 4 35 Million/uL  4 20-5 80   HEMOGLOBIN 14 5 g/dL  13 2-17 1   HEMATOCRIT 44 5 %  38 5-50 0    3 fL H 80 0-100 0   MCH 33 4 pg H 27 0-33 0   EOSINOPHILS 5 1 %     BASOPHILS 0 6 %     ABSOLUTE MONOCYTES 395 cells/uL  200-950   ABSOLUTE EOSINOPHILS 255 cells/uL     ABSOLUTE BASOPHILS 30 cells/uL  0-200   NEUTROPHILS 65 3 %     LYMPHOCYTES 21 1 %     MONOCYTES 7 9 %     MCHC 32 6 g/dL  32 0-36 0   RDW 13 9 %  11 0-15 0   PLATELET COUNT 370 Thousand/uL  140-400   MPV 7 8 fL  7 5-11 5   ABSOLUTE NEUTROPHILS 3265 cells/uL  8247-8584   ABSOLUTE LYMPHOCYTES 1055 cells/uL  850-3900     (Q) COMPREHENSIVE METABOLIC PNL W/ADJUSTED CALCIUM [id number] 10:04AM Carole Affine     Test Name Result Flag Reference   GLUCOSE 94 mg/dL  65-99   Fasting reference interval   UREA NITROGEN (BUN) 19 mg/dL  7-25   CREATININE 0 94 mg/dL  0 70-1 18   For patients >52years of age, the reference limit  for Creatinine is approximately 13% higher for people  identified as -American  eGFR NON-AFR   AMERICAN 80 mL/min/1 73m2  > OR = 60   eGFR AFRICAN AMERICAN 93 mL/min/1 73m2  > OR = 60   BUN/CREATININE RATIO   2-16   NOT APPLICABLE (calc)   AST 12 U/L  10-35   ALT 14 U/L  9-46   PROTEIN, TOTAL 6 5 g/dL  6 1-8 1   ALBUMIN 4 4 g/dL  3 6-5 1   GLOBULIN 2 1 g/dL (calc)  1 9-3 7   ALBUMIN/GLOBULIN RATIO 2 1 (calc)  1 0-2 5   BILIRUBIN, TOTAL 0 5 mg/dL  0 2-1 2   ALKALINE PHOSPHATASE 52 U/L     SODIUM 140 mmol/L  135-146   POTASSIUM 4 6 mmol/L  3 5-5 3   CHLORIDE 104 mmol/L     CARBON DIOXIDE 29 mmol/L  20-31   CALCIUM 9 6 mg/dL  8 6-10 3   CALCIUM (ADJUSTED FOR$ALBUMIN) 9 6 mg/dL (calc)  8 6-10 2     (Q) HEMOGLOBIN A1c WITH eAG [id number] 10:04AM Carole Hamper   REPORT COMMENT:  FASTING:YES     Test Name Result Flag Reference   HEMOGLOBIN A1c 5 8 % of total Hgb H <5 7   According to ADA guidelines, hemoglobin A1c <7 0%  represents optimal control in non-pregnant diabetic  patients  Different metrics may apply to specific  patient populations  Standards of Medical Care in    Diabetes Care  2013;36:s11-s66     For the purpose of screening for the presence of  diabetes  <5 7%       Consistent with the absence of diabetes  5 7-6 4%    Consistent with increased risk for diabetes              (prediabetes)  >or=6 5%    Consistent with diabetes     This assay result is consistent with an increased risk  of diabetes  Currently, no consensus exists for use of hemoglobin  A1c for diagnosis of diabetes for children  eAG (mg/dL) 120 (calc)     eAG (mmol/L) 6 6 (calc)       (Q) LIPID PANEL WITH REFLEX TO DIRECT LDL [id number] 10:04AM Roberto Gone[facility]s     Test Name Result Flag Reference   CHOLESTEROL, TOTAL 186 mg/dL  125-200   HDL CHOLESTEROL 50 mg/dL  > OR = 40   TRIGLICERIDES 145 mg/dL  <150   LDL-CHOLESTEROL 111 mg/dL (calc)  <130   Desirable range <100 mg/dL for patients with CHD or  diabetes and <70 mg/dL for diabetic patients with  known heart disease  CHOL/HDLC RATIO 3 7 (calc)  < OR = 5 0   NON HDL CHOLESTEROL 136 mg/dL (calc)     Target for non-HDL cholesterol is 30 mg/dL higher than   LDL cholesterol target       (Q) TSH, 3RD GENERATION W/REFLEX TO FT4 [id number] 10:04AM Roberto Polk     Test Name Result Flag Reference   TSH W/REFLEX TO FT4 2 41 mIU/L  0 40-4 50

## 2018-01-16 NOTE — PSYCH
1  Marital relationship problem (V61 10) (Z63 0)   2  OCD (obsessive compulsive disorder) (300 3) (F42 9)      Date of Initial Treatment Plan: 10/03/2017  Date of Current Treatment Plan: 10/03/2017  Treatment Plan Initial      Strengths/Personal Resources for Self Care: Strengths-easy going, dependable, reliable  The wife believes to himself he is all of those things or people he wants to impress  Diagnosis:   Axis I: severe OCD   Axis III: no major health issues     Area of Needs: 1-I need to decrease my obsessive habits or rituals  2- I need to develop a better relationship with my wife  3 -I want a closer relationship with my kids  Long Term Goals:   I will have less habits and rituals   Target Date: 01/03/2018      I will have a better relationship with my wife  Target Date: 01/03/2018      I will have a closer relationship with my children  Target Date: 01/    Short Term Objectives:   Goal 1:   I will take the medications and will use the strategies I learn in counseling  Target Date: 01/03/18      Goal 2:   I will pay more attention to my wife and children and I will listen to what is important to them  It will be about them not me  Target Date: 01/03/2018      Goal 3:   I will initiate contact with my childfren  Target Date: 01/03/2018      GOAL 1: Modality: Individual every 2 weeks x per month Target Date: 01/03/18         GOAL 2: Modality: Individual every 2 weeks x per month Target Date: 01/03/2018         GOAL 3: Modality: Individual every 2 weeks x per month Target Date: 01/03/2018             The first scheduled review date is 01/03/2018  The expected length of service is 12 to 15 months  Level of functioning at initial assessment: 45-50  The highest level of functioning in the past year was 45-50  The current level of functioning is 45-50  Patient Signature: _________________________________ Date/Time: ______________        1   Anxiety (300 00) (F41 9) 2  Benign prostatic hypertrophy (600 00) (N40 0)   3  Cataract (366 9) (H26 9)   4  Erectile dysfunction of non-organic origin (302 72) (F52 21)   5  Hearing loss (389 9) (H91 90)   6  History of OCD (obsessive compulsive disorder) (V11 2) (Z86 59)   7  Hyperlipidemia (272 4) (E78 5)   8  Hypothyroidism (244 9) (E03 9)   9  Impaired fasting glucose (790 21) (R73 01)   10  Macrocytosis (289 89) (D75 89)   11  Marital relationship problem (V61 10) (Z63 0)   12  Medicare annual wellness visit, subsequent (V70 0) (Z00 00)   13  Memory loss (780 93) (R41 3)   14  Mild cognitive impairment (331 83) (G31 84)   15  Need for prophylactic vaccination and inoculation against influenza (V04 81) (Z23)   16  Need for vaccination with 13-polyvalent pneumococcal conjugate vaccine (V03 82) (Z23)   17  OCD (obsessive compulsive disorder) (300 3) (F42 9)   18  Osteoarthritis (715 90) (M19 90)   19  Other chronic pain (338 29) (G89 29)   20  Primary localized osteoarthrosis of the hip, unspecified laterality (715 15) (M16 10)   21  Tension type headache (339 10) (G44 209)   22   Visit for pre-operative examination (Y14 33) (Y30 253)     Electronically signed by : SERA Ramirez; Oct  3 2017  5:14PM EST                       (Author)    Electronically signed by : SERA Ramirez; Oct  3 2017  5:20PM EST                       (Author)    Electronically signed by : SERA Ramirze; Oct  3 2017  5:46PM EST                       (Author)

## 2018-01-18 NOTE — PSYCH
Progress Note  Psychotherapy Provided [facility]: Family Therapy provided today  Goals addressed in session:   Data: The client Norm Ann and his wife arrived for the session  He will see Dr Marti Hawley in February  The family MD titrated his Prozac up to 40 mg  The client and his wife discussed their kids  Their adopted daughter lives in her words not a good lifestyle and no longer talks to them  Their one son is an alcoholic  The other 2 are successful  Champ's wife said he was involved very little with their upbringing  We set up his goals  He wants his OCD decreased, he wants a better relationship with his wife and with his adult children  According to the client he wasn't overly involved with his kids and shared he didn't like confrontation  She feels he gives her no respect, it is about his rules he gives her no respect or privacy  Jaz George claims she is ready to leave  Their late 27some year old alcoholic son lives there and they both can't leave for the most part because they worry about what he will do  She claims Norm Ann is the enabler  Assessment: The couple is mismatched and Jaz George has resentment about what he has put her thru over the years  She processes her list when she comes in with him  Plan: Continue to help them skill build in distress tolerance, to set boundaries with their alcoholic son and to help them decide where they want to go  Pain Scale and Suicide Risk [facility]: Current Pain Assessment: no pain   On a scale of 0 to 10, the patient rates current pain at 0   Current suicide risk is low   Behavioral Health Treatment Plan [facility]: Diagnosis and Treatment Plan explained to patient, patient relates understanding diagnosis and is agreeable to Treatment Plan  Assessment    1  Marital relationship problem (V61 10) (Z63 0)   2   OCD (obsessive compulsive disorder) (300 3) (F42 9)    Signatures   Electronically signed by : Lg Mayorga, ACSWLCSW; Oct  3 2017  5:29PM EST (Author)    Electronically signed by : Sanjuanita Rodriguez ACSWLCSW; Oct  3 2017  5:46PM EST                       (Author)
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
## 2018-01-25 ENCOUNTER — OFFICE VISIT (OUTPATIENT)
Dept: BEHAVIORAL/MENTAL HEALTH CLINIC | Facility: CLINIC | Age: 73
End: 2018-01-25
Payer: MEDICARE

## 2018-01-25 DIAGNOSIS — F42.2 MIXED OBSESSIONAL THOUGHTS AND ACTS: Primary | ICD-10-CM

## 2018-01-25 DIAGNOSIS — Z63.0 MARITAL RELATIONSHIP PROBLEM: ICD-10-CM

## 2018-01-25 DIAGNOSIS — F41.1 GAD (GENERALIZED ANXIETY DISORDER): ICD-10-CM

## 2018-01-25 PROCEDURE — 90834 PSYTX W PT 45 MINUTES: CPT | Performed by: SOCIAL WORKER

## 2018-01-25 SDOH — SOCIAL STABILITY - SOCIAL INSECURITY: PROBLEMS IN RELATIONSHIP WITH SPOUSE OR PARTNER: Z63.0

## 2018-01-25 NOTE — PSYCH
Date of Initial Treatment Plan: 10/03/2017  Date of Current Treatment Plan: 01/25/18    Treatment Plan Number 1st update     Strengths/Personal Resources for Self Care: He describes himself as dependable,reliable,and easygoing but his wife shared it is only towards people he wants to impress  Diagnosis:   1  Mixed obsessional thoughts and acts     2  MANNY (generalized anxiety disorder)     3  Marital relationship problem       Tigre Paz needs to reduce his habits and his rituals  2- The client wants to continue to improve his relationship with his wife  3-Champ needs to control his angry explosive emotions and to listen and process more  Long Term Goals: Tigre Paz will reduce his habits and his rituals  Target Date:05/25/2018Completion Date:   Tigre Paz will have a better relationship with his wife  Target Date: 05/25/2018  Completion Date:       Target Date: Tigre Paz will learn to control his angry and explosive emotions  Completion Date:     Short Term Objectives:  Goal 1: continue to stay medication compliant,and in therapy will discuss behavioral therapy strategies to reduce habits and rituals  Target Date:05/25/2018  Completion Date:     Goal 2: Tigre Paz will continue to work on how he treats and reacts to his wife  Target Date: 05/25/2018  Completion Date:     Goal 3: He will have less anger and explosive behaviors  Target Date: 05/25/2018Completion Date:     GOAL 1: Modality: Individual   x per month Target Date:   Completion Date:  GOAL 1: Modality: Group   x per month Target Date:     Completion Date:   GOAL 1: Modality: Couples    x per month Target Date:     Completion Date:   GOAL 1: Modality: Family    x2 times per month Target Date: 05/25/2018   Completion Date:   GOAL 1: Modality: Medication Management    x per month Target Date:  Completion Date:   The person(s) responsible for carrying out the plan is        GOAL 2: Modality: Individual test    x per month Target Date:  Completion Date:   GOAL 2: Modality: Group   x per month Target Date:   Completion Date:  GOAL 2: Modality: Couples  x per month Target Date: test Completion Date:  GOAL 2: Modality: Family  2 times per month Target Date:05/25/2018   Completion Date:    GOAL 2: Modality: Medication Management   x per month Target Date:  Completion Date:    The person(s) responsible for carrying out the plan is  Mary Lizarraga   GOAL 3: Modality: Individual    x per month Target Date:  Completion Date:   GOAL 3: Modality: Group  x per month Target Date:   Completion Date:  GOAL 3: Modality: Couples    x per month Target Date:   Completion Date:   GOAL 3: Modality: Family  2 times per month Target Date:05/25/2018   Completion Date:   GOAL 3: Modality: Medication Management  x per month   Target Date:   Completion Date:   The person(s) responsible for carrying out the plan is     The first scheduled review date is  05/25/18    The expected length of service is   12 to 15 months   Level of functioning at initial assessment:50-55    The highest level of functioning in the past year was   50-55    The current level of functioning is 50-55      [address]: Diagnosis and Treatment Plan explained to Mary Jane Fernandes relates understanding diagnosis and is agreeable to Treatment Plan         CLIENT COMMENTS / Please share your thoughts, feelings, need and/or experiences regarding your treatment plan:       __________________________________________________________________    __________________________________________________________________    __________________________________________________________________    __________________________________________________________________    _______________________________________     Date/Time: _01/25/2018/1400_____________           Patient Signature: _________________________________     Date/Time: ______________

## 2018-01-25 NOTE — PSYCH
Psychotherapy Provided: Family Therapy     Length of time in session: 45 minutes, follow up in 2 week    Goals addressed in session: Goal 1, Goal 2 and Goal 3      Pain:      none    0    Current suicide risk : Low   Data: Sushant and his wife arrived for the session  She feels he is trying to be more positive and is helping her more  He is less angry and argumentative  He was started on Prozac by the family MD and he is currently on [de-identified] mg   Sushant still is doing his rituals and routines per his wife in order to function but in her words he is a tad better  She is an Rn  We completed his recovery plan update  He wants to reduce his habits and his rituals  Sushant also wants to continue to improve his relationship with his wife and he wants to have less anger and explosive episodes  We worked on mindfulness, behavioral strategies, communication strategies and anger management strategies  Assessment: Nanda Blanton his wife feels he is very self centered and initially she wanted to leave the marriage  However she believes slowly things are getting better but she still feels she needs to see more progress  Behavioral Health Treatment Plan [facility]: Diagnosis and Treatment Plan explained to Fabiola Carreno relates understanding diagnosis and is agreeable to Treatment Plan   Yes

## 2018-02-12 ENCOUNTER — TELEPHONE (OUTPATIENT)
Dept: FAMILY MEDICINE CLINIC | Facility: CLINIC | Age: 73
End: 2018-02-12

## 2018-02-16 ENCOUNTER — OFFICE VISIT (OUTPATIENT)
Dept: BEHAVIORAL/MENTAL HEALTH CLINIC | Facility: CLINIC | Age: 73
End: 2018-02-16
Payer: MEDICARE

## 2018-02-16 DIAGNOSIS — F42.2 MIXED OBSESSIONAL THOUGHTS AND ACTS: Primary | ICD-10-CM

## 2018-02-16 DIAGNOSIS — Z63.0 MARITAL DYSFUNCTION: ICD-10-CM

## 2018-02-16 PROCEDURE — 90834 PSYTX W PT 45 MINUTES: CPT | Performed by: SOCIAL WORKER

## 2018-02-16 SDOH — SOCIAL STABILITY - SOCIAL INSECURITY: PROBLEMS IN RELATIONSHIP WITH SPOUSE OR PARTNER: Z63.0

## 2018-02-16 NOTE — PSYCH
Psychotherapy Provided: Individual Psychotherapy 45 minutes     Length of time in session: 45 minutes, follow up in 2 week    Goals addressed in session: Goal 1, Goal 2 and Goal 3      Pain:      moderate to severe    0    Current suicide risk : Low     Data: Lavell Nyhan arrived for his session  He is very compliant he shared with his medication management  He feels the medications are helping with reducing his obsessional thinking and his rituals  He feels his wife and he are communicating more and he realizes and acknowledges his wife sees him as self centered  Regarding his recovery goals he feels his habits, rituals,and obsessional thinking have been reduced due to therapy and the medication  Regarding goal 2 he feels his relationship with his wife is getting better  Regarding goal 3 he feels his anger and his explosiveness is more under control due to therapy and medications  Assessment: The client seems to have responded and continues to respond well to therapy , mindfulness, meditation along with deep breathing and guided imagery  He is doing better  Plan: Continue to skill build in distress tolerance  Behavioral Health Treatment Plan [facility]: Diagnosis and Treatment Plan explained to Martinez Apo relates understanding diagnosis and is agreeable to Treatment Plan   Yes

## 2018-03-01 ENCOUNTER — OFFICE VISIT (OUTPATIENT)
Dept: BEHAVIORAL/MENTAL HEALTH CLINIC | Facility: CLINIC | Age: 73
End: 2018-03-01
Payer: MEDICARE

## 2018-03-01 ENCOUNTER — TELEPHONE (OUTPATIENT)
Dept: NEUROLOGY | Facility: CLINIC | Age: 73
End: 2018-03-01

## 2018-03-01 DIAGNOSIS — Z63.0 MARITAL DYSFUNCTION: ICD-10-CM

## 2018-03-01 DIAGNOSIS — F42.2 MIXED OBSESSIONAL THOUGHTS AND ACTS: Primary | ICD-10-CM

## 2018-03-01 PROCEDURE — 90847 FAMILY PSYTX W/PT 50 MIN: CPT | Performed by: SOCIAL WORKER

## 2018-03-01 SDOH — SOCIAL STABILITY - SOCIAL INSECURITY: PROBLEMS IN RELATIONSHIP WITH SPOUSE OR PARTNER: Z63.0

## 2018-03-01 NOTE — PSYCH
Psychotherapy Provided: Individual Psychotherapy 45 minutes     Length of time in session: 45 minutes, follow up in 2 week    Goals addressed in session: Goal 1, Goal 2 and Goal 3      Pain:      moderate to severe    0    Current suicide risk : Low     Data: The client arrived for the session with his wife  She shared that he made some progress but shared about the self centered behaviors he has  He is often rude to her about things that most people would know as simple current courtesy  She talked about how for many years she kept her mouth shut about some of the things he did  We discussed his recovery goals of reducing his habits and rituals, how he is managing his anger and explosiveness, and how he treats and reacts to his wife  Assessment: We discussed mindfulness and communication skills  We also discussed ways to decrease his habits and his rituals,we worked on anger management,and we worked on communication skills  Plan: Continue to skill build in distress tolerance  Behavioral Health Treatment Plan [facility]: Diagnosis and Treatment Plan explained to Rick Blake relates understanding diagnosis and is agreeable to Treatment Plan   Yes

## 2018-03-15 ENCOUNTER — OFFICE VISIT (OUTPATIENT)
Dept: BEHAVIORAL/MENTAL HEALTH CLINIC | Facility: CLINIC | Age: 73
End: 2018-03-15
Payer: MEDICARE

## 2018-03-15 DIAGNOSIS — Z63.0 MARITAL DYSFUNCTION: ICD-10-CM

## 2018-03-15 DIAGNOSIS — F42.2 MIXED OBSESSIONAL THOUGHTS AND ACTS: Primary | ICD-10-CM

## 2018-03-15 PROCEDURE — 90834 PSYTX W PT 45 MINUTES: CPT | Performed by: SOCIAL WORKER

## 2018-03-15 SDOH — SOCIAL STABILITY - SOCIAL INSECURITY: PROBLEMS IN RELATIONSHIP WITH SPOUSE OR PARTNER: Z63.0
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
## 2018-03-28 ENCOUNTER — HOSPITAL ENCOUNTER (OUTPATIENT)
Facility: HOSPITAL | Age: 73
Setting detail: OUTPATIENT SURGERY
Discharge: HOME | End: 2018-03-28
Attending: COLON & RECTAL SURGERY | Admitting: COLON & RECTAL SURGERY
Payer: MEDICARE

## 2018-03-28 ENCOUNTER — ANESTHESIA EVENT (OUTPATIENT)
Dept: GASTROENTEROLOGY | Facility: HOSPITAL | Age: 73
End: 2018-03-28
Payer: MEDICARE

## 2018-03-28 ENCOUNTER — ANESTHESIA (OUTPATIENT)
Dept: GASTROENTEROLOGY | Facility: HOSPITAL | Age: 73
End: 2018-03-28
Payer: MEDICARE

## 2018-03-28 VITALS
HEART RATE: 52 BPM | OXYGEN SATURATION: 99 % | WEIGHT: 165 LBS | RESPIRATION RATE: 18 BRPM | SYSTOLIC BLOOD PRESSURE: 149 MMHG | DIASTOLIC BLOOD PRESSURE: 77 MMHG | HEIGHT: 72 IN | BODY MASS INDEX: 22.35 KG/M2 | TEMPERATURE: 98.1 F

## 2018-03-28 RX ORDER — FLUOXETINE HYDROCHLORIDE 40 MG/1
40 CAPSULE ORAL DAILY
COMMUNITY
End: 2018-04-19 | Stop reason: SDUPTHER

## 2018-03-28 RX ORDER — BUTALBITAL, ACETAMINOPHEN AND CAFFEINE 50; 325; 40 MG/1; MG/1; MG/1
1 TABLET ORAL EVERY 4 HOURS PRN
COMMUNITY
End: 2018-04-19 | Stop reason: SDUPTHER

## 2018-03-28 RX ORDER — PROPOFOL 10 MG/ML
INJECTION, EMULSION INTRAVENOUS AS NEEDED
Status: DISCONTINUED | OUTPATIENT
Start: 2018-03-28 | End: 2018-03-28 | Stop reason: SURG

## 2018-03-28 RX ORDER — LEVOTHYROXINE SODIUM 0.07 MG/1
75 TABLET ORAL DAILY
COMMUNITY
End: 2018-04-19 | Stop reason: SDUPTHER

## 2018-03-28 RX ORDER — SODIUM CHLORIDE 9 MG/ML
INJECTION, SOLUTION INTRAVENOUS CONTINUOUS PRN
Status: DISCONTINUED | OUTPATIENT
Start: 2018-03-28 | End: 2018-03-28 | Stop reason: SURG

## 2018-03-28 RX ORDER — NIACIN 500 MG
500 TABLET ORAL
COMMUNITY

## 2018-03-28 RX ORDER — PROPOFOL 10 MG/ML
INJECTION, EMULSION INTRAVENOUS CONTINUOUS PRN
Status: DISCONTINUED | OUTPATIENT
Start: 2018-03-28 | End: 2018-03-28 | Stop reason: SURG

## 2018-03-28 RX ADMIN — SODIUM CHLORIDE: 0.9 INJECTION, SOLUTION INTRAVENOUS at 08:22

## 2018-03-28 RX ADMIN — PROPOFOL 100 MCG/KG/MIN: 10 INJECTION, EMULSION INTRAVENOUS at 09:11

## 2018-03-28 RX ADMIN — PROPOFOL 20 MG: 10 INJECTION, EMULSION INTRAVENOUS at 09:09

## 2018-03-28 RX ADMIN — PROPOFOL 70 MG: 10 INJECTION, EMULSION INTRAVENOUS at 09:05

## 2018-03-28 RX ADMIN — PROPOFOL 20 MG: 10 INJECTION, EMULSION INTRAVENOUS at 09:07

## 2018-03-28 NOTE — H&P
History and Physical   Colon and Rectal Surgery   Long Schofield 74 y o  male MRN: [id number]  Unit/Bed#: [facility] Encounter: [id number]  03/28/18   9:02 AM      CC: History of recurrent polyps  History of Present Illness   HPI:  Long Schofield is a 74 y o  male with no GI symptoms  Historical Information   Past Medical History:   Diagnosis Date    Anxiety     Colon polyps      Past Surgical History:   Procedure Laterality Date    HIP SURGERY      THYROIDECTOMY, PARTIAL      TONSILLECTOMY         Meds/Allergies     Prescriptions Prior to Admission   Medication    butalbital-acetaminophen-caffeine (FIORICET,ESGIC) -40 mg per tablet    FLUoxetine (PROzac) 40 MG capsule    levothyroxine 75 mcg tablet    niacin 500 mg tablet       No current facility-administered medications for this encounter  No Known Allergies      Social History   History   Alcohol Use No     History   Drug Use No     History   Smoking Status    Former Smoker   Smokeless Tobacco    Never Used         Family History: History reviewed  No pertinent family history  Objective     Current Vitals:   Blood Pressure: (!) 184/87 (03/28/18 0819)  Pulse: 62 (03/28/18 0819)  Temperature: 98 1 °F (36 7 °C) (03/28/18 0819)  Temp Source: Oral (03/28/18 0819)  Respirations: 16 (03/28/18 0819)  Height: 6' (182 9 cm) (03/28/18 0819)  Weight - Scale: 74 8 kg (165 lb) (03/28/18 0819)  SpO2: 97 % (03/28/18 0819)  No intake or output data in the 24 hours ending 03/28/18 0902    Physical Exam:  General:  Well nourished, no distress  Neuro: Alert and oriented  Eyes:Sclera anicteric, conjunctiva pink  Pulm: Clear to auscultation bilaterally  No respiratory Distress  CV:  Regular rate and rhythm  Subtle systolic murmur  Abdomen:  Soft, flat, non-tender, without masses or hepatosplenomegaly  Lab Results:       ASSESSMENT:  Long Schofield is a 74 y o  male for surveillance of polyps  PLAN:  Colonoscopy    Risks , including, but not limited to, bleeding, perforation, missed lesions, and potential need for surgery, were reviewed  Alternatives to colonoscopy were discussed    Julio César Beaver MD

## 2018-03-28 NOTE — ANESTHESIA PREPROCEDURE EVALUATION
Review of Systems/Medical History  Patient summary reviewed  Chart reviewed  No history of anesthetic complications     Cardiovascular  Negative cardio ROS    Pulmonary  Negative pulmonary ROS        GI/Hepatic    Bowel prep  Comment: Re-eval of polyps     Negative  ROS        Endo/Other  History of thyroid disease , hypothyroidism,      GYN       Hematology  Negative hematology ROS      Musculoskeletal  Negative musculoskeletal ROS        Neurology  Negative neurology ROS      Psychology   Anxiety,              Physical Exam    Airway    Mallampati score: I  TM Distance: >3 FB  Neck ROM: full     Dental   No notable dental hx     Cardiovascular  Comment: Negative ROS, Cardiovascular exam normal    Pulmonary  Pulmonary exam normal     Other Findings        Anesthesia Plan  ASA Score- 2     Anesthesia Type- IV sedation with anesthesia with ASA Monitors  Additional Monitors:   Airway Plan:         Plan Factors-    Induction- intravenous  Postoperative Plan- Plan for postoperative opioid use  Informed Consent- Anesthetic plan and risks discussed with patient  I personally reviewed this patient with the CRNA  Discussed and agreed on the Anesthesia Plan with the CRNA  Juan Agustin

## 2018-03-28 NOTE — ANESTHESIA POSTPROCEDURE EVALUATION
Post-Op Assessment Note      CV Status:  Stable    Mental Status:  Alert and awake    Hydration Status:  Euvolemic    PONV Controlled:  Controlled    Airway Patency:  Patent    Post Op Vitals Reviewed: Yes          Staff: CRNA           BP  111/52   Temp      Pulse  55   Resp 16   SpO2   95

## 2018-03-28 NOTE — OP NOTE
**** GI/ENDOSCOPY REPORT ****     PATIENT NAME: Ruchi HUBER ------ VISIT ID:  Patient ID:   [id number] YOB: 1945     INTRODUCTION: Colonoscopy - A 74 male patient presents for an outpatient   Colonoscopy at [address] [place] [address]  PREVIOUS COLONOSCOPY:     INDICATIONS: Screening for personal history of polyps  CONSENT:  The benefits, risks, and alternatives to the procedure were   discussed and informed consent was obtained from the patient  PREPARATION: EKG, pulse, pulse oximetry and blood pressure were monitored   throughout the procedure  The patient was identified by myself both   verbally and by visual inspection of ID band  MEDICATIONS: Anesthesia-check records     PROCEDURE:  The endoscope was passed without difficulty through the anus   under direct visualization and advanced to the cecum, confirmed by   appendiceal orifice, ileocecal valve, and cecal strap (crow's foot)  The   scope was withdrawn and the mucosa was carefully examined  The quality of   the preparation was  Cecal Intubation Time: Minute(s) Scope Withdrawal   Time: Minute(s)     RECTAL EXAM: Normal rectal exam  No prostatic nodules  Moderately enlarged   prostate  FINDINGS:  The colon appeared to be normal      COMPLICATIONS: There were no complications  IMPRESSIONS: Moderately enlarged prostate was noted during the rectal   exam  Normal colon  RECOMMENDATIONS: Colonoscopy recommended in 5 years  ESTIMATED BLOOD LOSS:     PATHOLOGY SPECIMENS:     PROCEDURE CODES:     ICD-9 Codes: V12 72 Personal history of colonic polyps 600 00 Hypertrophy   (benign) of prostate without urinary obstruction and other lower urinary   tract symptom (LUTS)     ICD-10 Codes: Z86 010 Personal history of colonic polyps N40 Enlarged   prostate     PERFORMED BY: TIM Martinez  on 03/28/2018  Version 1, electronically signed by TIM Stevenson  on 03/28/2018 at   09:28

## 2018-04-16 ENCOUNTER — OFFICE VISIT (OUTPATIENT)
Dept: BEHAVIORAL/MENTAL HEALTH CLINIC | Facility: CLINIC | Age: 73
End: 2018-04-16
Payer: MEDICARE

## 2018-04-16 DIAGNOSIS — Z63.0 MARITAL DYSFUNCTION: ICD-10-CM

## 2018-04-16 DIAGNOSIS — F42.2 MIXED OBSESSIONAL THOUGHTS AND ACTS: Primary | ICD-10-CM

## 2018-04-16 PROCEDURE — 90834 PSYTX W PT 45 MINUTES: CPT | Performed by: SOCIAL WORKER

## 2018-04-16 SDOH — SOCIAL STABILITY - SOCIAL INSECURITY: PROBLEMS IN RELATIONSHIP WITH SPOUSE OR PARTNER: Z63.0

## 2018-04-16 NOTE — PSYCH
Psychotherapy Provided: Individual Psychotherapy 45 minutes     Length of time in session: 45 minutes, follow up in 2 week    Goals addressed in session: Goal 1, Goal 2 and Goal 3      Pain:      moderate to severe    0    Current suicide risk : Low     Data: The client stated his wife shared she does not think he is taking the meds because he is not sweating like she did when she was on what he is on  We discussed not everyone has the same side effects from medications  Also she claims his OCD is not better  However I may have to educate her on what true OCD is versus people who have regular patterns of behavior  He discussed his adult son who is an alcoholic may be moving out and did just get a job  The client's wife was not able to make it today but Marina Griffiths is open to hear her perceptions about his behaviors  Per a note his wife does not feel his habits and rituals have not improved, he feels he has  It is possible he is just rituaistic versus true OCD  Regarding goal 2 he feels his wife continues to have negative feelings about him  Regarding goal 3 we discussed his anger and explosiveness towards objects not people  We discussed anger management issues and we worked on skill building in distress tolerance  Plan: Will discuss with his wife next session her concerns and perceptions and how it affects their marriage  Behavioral Health Treatment Plan [facility]: Diagnosis and Treatment Plan explained to Leonid Francisco relates understanding diagnosis and is agreeable to Treatment Plan   Yes

## 2018-04-17 LAB
ALBUMIN SERPL-MCNC: 4.6 G/DL (ref 3.6–5.1)
ALBUMIN/GLOB SERPL: 1.9 (CALC) (ref 1–2.5)
ALP SERPL-CCNC: 59 U/L (ref 40–115)
ALT SERPL-CCNC: 19 U/L (ref 9–46)
AST SERPL-CCNC: 14 U/L (ref 10–35)
BILIRUB SERPL-MCNC: 0.6 MG/DL (ref 0.2–1.2)
BUN SERPL-MCNC: 21 MG/DL (ref 7–25)
BUN/CREAT SERPL: NORMAL (CALC) (ref 6–22)
CALCIUM ALBUM COR SERPL-MCNC: 9.7 MG/DL (CALC) (ref 8.6–10.2)
CALCIUM SERPL-MCNC: 9.8 MG/DL (ref 8.6–10.3)
CHLORIDE SERPL-SCNC: 101 MMOL/L (ref 98–110)
CHOLEST SERPL-MCNC: 206 MG/DL
CHOLEST/HDLC SERPL: 3.7 (CALC)
CO2 SERPL-SCNC: 29 MMOL/L (ref 20–31)
CREAT SERPL-MCNC: 0.91 MG/DL (ref 0.7–1.18)
EST. AVERAGE GLUCOSE BLD GHB EST-MCNC: 108 (CALC)
EST. AVERAGE GLUCOSE BLD GHB EST-SCNC: 6 (CALC)
GLOBULIN SER CALC-MCNC: 2.4 G/DL (CALC) (ref 1.9–3.7)
GLUCOSE SERPL-MCNC: 83 MG/DL (ref 65–99)
HBA1C MFR BLD: 5.4 % OF TOTAL HGB
HDLC SERPL-MCNC: 55 MG/DL
LDLC SERPL CALC-MCNC: 126 MG/DL (CALC)
NONHDLC SERPL-MCNC: 151 MG/DL (CALC)
POTASSIUM SERPL-SCNC: 4.8 MMOL/L (ref 3.5–5.3)
PROT SERPL-MCNC: 7 G/DL (ref 6.1–8.1)
SL AMB EGFR AFRICAN AMERICAN: 95 ML/MIN/1.73M2
SL AMB EGFR NON AFRICAN AMERICAN: 82 ML/MIN/1.73M2
SODIUM SERPL-SCNC: 138 MMOL/L (ref 135–146)
TRIGL SERPL-MCNC: 132 MG/DL
TSH SERPL-ACNC: 2.51 MIU/L (ref 0.4–4.5)

## 2018-04-18 RX ORDER — DUTASTERIDE AND TAMSULOSIN HYDROCHLORIDE CAPSULES .5; .4 MG/1; MG/1
1 CAPSULE ORAL DAILY
COMMUNITY

## 2018-04-18 RX ORDER — FLUOCINOLONE ACETONIDE 0.25 MG/G
OINTMENT TOPICAL
COMMUNITY
Start: 2018-03-22 | End: 2019-01-07

## 2018-04-18 RX ORDER — ALPRAZOLAM 0.25 MG/1
1 TABLET ORAL EVERY 8 HOURS PRN
COMMUNITY
Start: 2012-02-14 | End: 2018-04-19 | Stop reason: CLARIF

## 2018-04-19 ENCOUNTER — OFFICE VISIT (OUTPATIENT)
Dept: FAMILY MEDICINE CLINIC | Facility: CLINIC | Age: 73
End: 2018-04-19
Payer: MEDICARE

## 2018-04-19 VITALS
DIASTOLIC BLOOD PRESSURE: 72 MMHG | BODY MASS INDEX: 23.62 KG/M2 | RESPIRATION RATE: 16 BRPM | TEMPERATURE: 97.1 F | SYSTOLIC BLOOD PRESSURE: 130 MMHG | HEIGHT: 70 IN | HEART RATE: 76 BPM | WEIGHT: 165 LBS

## 2018-04-19 DIAGNOSIS — R73.01 IMPAIRED FASTING GLUCOSE: ICD-10-CM

## 2018-04-19 DIAGNOSIS — E78.5 HYPERLIPIDEMIA, UNSPECIFIED HYPERLIPIDEMIA TYPE: ICD-10-CM

## 2018-04-19 DIAGNOSIS — E03.9 HYPOTHYROIDISM, UNSPECIFIED TYPE: ICD-10-CM

## 2018-04-19 DIAGNOSIS — G44.209 TENSION-TYPE HEADACHE, NOT INTRACTABLE, UNSPECIFIED CHRONICITY PATTERN: ICD-10-CM

## 2018-04-19 DIAGNOSIS — F42.9 OBSESSIVE-COMPULSIVE DISORDER, UNSPECIFIED TYPE: ICD-10-CM

## 2018-04-19 DIAGNOSIS — F41.9 ANXIETY: ICD-10-CM

## 2018-04-19 DIAGNOSIS — Z00.00 MEDICARE ANNUAL WELLNESS VISIT, SUBSEQUENT: Primary | ICD-10-CM

## 2018-04-19 PROCEDURE — G0439 PPPS, SUBSEQ VISIT: HCPCS | Performed by: FAMILY MEDICINE

## 2018-04-19 PROCEDURE — 99214 OFFICE O/P EST MOD 30 MIN: CPT | Performed by: FAMILY MEDICINE

## 2018-04-19 RX ORDER — ALPRAZOLAM 0.5 MG/1
0.5 TABLET ORAL DAILY PRN
Qty: 30 TABLET | Refills: 0 | Status: SHIPPED | OUTPATIENT
Start: 2018-04-19 | End: 2018-05-14 | Stop reason: SDUPTHER

## 2018-04-19 RX ORDER — LEVOTHYROXINE SODIUM 0.07 MG/1
75 TABLET ORAL DAILY
Qty: 90 TABLET | Refills: 3 | Status: SHIPPED | OUTPATIENT
Start: 2018-04-19 | End: 2019-05-03 | Stop reason: SDUPTHER

## 2018-04-19 RX ORDER — FLUOXETINE HYDROCHLORIDE 40 MG/1
40 CAPSULE ORAL 2 TIMES DAILY
Qty: 180 CAPSULE | Refills: 3 | Status: SHIPPED | OUTPATIENT
Start: 2018-04-19 | End: 2018-08-31 | Stop reason: SDUPTHER

## 2018-04-19 RX ORDER — BUTALBITAL, ACETAMINOPHEN AND CAFFEINE 50; 325; 40 MG/1; MG/1; MG/1
1 TABLET ORAL EVERY 6 HOURS PRN
Qty: 100 TABLET | Refills: 1 | Status: SHIPPED | OUTPATIENT
Start: 2018-04-19 | End: 2019-05-03 | Stop reason: SDUPTHER

## 2018-04-19 NOTE — PROGRESS NOTES
Chief Complaint   Patient presents with   [facility] Wellness Visit     Annual wellness visit   Follow-up     6 month follow up for Hyperlipidemia, Hypothyroidism, Impaired fasting glucose, OCD (obsessive compulsive disorder), and Tension type headache  HPI:  Teddy Harrison is a 74 y o  male here for his Subsequent Wellness Visit  Patient Active Problem List   Diagnosis    Mixed obsessional thoughts and acts    Marital dysfunction    Anxiety    Benign prostatic hyperplasia    Cataract    Erectile dysfunction of non-organic origin    Hearing loss    Hyperlipidemia    Hypothyroidism    Impaired fasting glucose    Macrocytosis    Memory loss    Mild cognitive impairment    OCD (obsessive compulsive disorder)    Osteoarthritis    Primary localized osteoarthrosis of the hip    Tension type headache     Past Medical History:   Diagnosis Date    Abnormal weight loss     Anxiety     Bursitis of left hip     Cervical radiculopathy     Colon polyps     Depression     Nicotine dependence in remission     Non-toxic multinodular goiter      Past Surgical History:   Procedure Laterality Date   Lexis Cellar Left     Dr Ray Singh; onset: 8/6/13    COLONOSCOPY      3/20/13, normal clon - Dr Kd Marcelino, repeat in 5-10 years    HIP SURGERY      WY COLONOSCOPY FLX DX W/COLLJ SPEC WHEN PFRMD N/A 3/28/2018    Procedure: COLONOSCOPY;  Surgeon: Hung Salazar MD;  Location: [facility];   Service: Colorectal    THYROIDECTOMY, PARTIAL      TONSILLECTOMY      TOTAL HIP ARTHROPLASTY       Family History   Problem Relation Age of Onset    Diabetes Father     Lung cancer Father     Alcohol abuse Paternal Uncle     Heart disease Paternal Uncle      History   Smoking Status    Former Smoker   Smokeless Tobacco    Never Used     History   Alcohol Use No     Comment: social use as per [employer]      History   Drug Use No     /72 (BP Location: Left arm, Patient Position: Sitting, Cuff Size: Standard)   Pulse 76   Temp (!) 97 1 °F (36 2 °C) (Tympanic)   Resp 16   Ht 5' 9 5" (1 765 m)   Wt 74 8 kg (165 lb)   BMI 24 02 kg/m²       Current Outpatient Prescriptions   Medication Sig Dispense Refill    butalbital-acetaminophen-caffeine (FIORICET,ESGIC) -40 mg per tablet Take 1 tablet by mouth every 6 (six) hours as needed for headaches for up to 90 days 100 tablet 1    Dutasteride-Tamsulosin HCl (DMITRI) 0 5-0 4 MG CAPS Take 1 capsule by mouth daily      fluocinolone (SYNALAR) 0 025 % ointment       levothyroxine 75 mcg tablet Take 1 tablet (75 mcg total) by mouth daily for 90 days 90 tablet 3    niacin 500 mg tablet Take 500 mg by mouth daily with breakfast      ALPRAZolam (XANAX) 0 5 mg tablet Take 1 tablet (0 5 mg total) by mouth daily as needed for anxiety for up to 30 days 30 tablet 0    FLUoxetine (PROzac) 40 MG capsule Take 1 capsule (40 mg total) by mouth 2 (two) times a day for 90 days 180 capsule 3     No current facility-administered medications for this visit  Allergies   Allergen Reactions    Aspirin      Other reaction(s): Blood Disorders, rectal bleeding/friable prostate  Category:  Adverse Reaction;      Immunization History   Administered Date(s) Administered    H1N1, All Formulations 01/03/2010    Influenza Split High Dose Preservative Free IM 09/16/2014, 09/29/2015, 09/29/2016    Influenza TIV (IM) 10/01/2012    Pneumococcal Conjugate 13-Valent 09/29/2015    Pneumococcal Polysaccharide PPV23 11/15/2010    Tdap 12/01/2012    Varicella 01/01/2013    Zoster 01/01/2013       Patient Care Team:  Bernabe Ling MD as PCP - General    Medicare Screening Tests and Risk Assessments:  AWV Clinical     ISAR:   Previous hospitalizations?:  No       Once in a Lifetime Medicare Screening:   AAA screening performed? (if performed, please add date to Health Maintenance):  No       Medicare Screening Tests and Risk Assessment:   AAA Risk Assessment     Tobacco use (males only): No    Family history of AAA:  No   Osteoporosis Risk Assessment    HIV Risk Assessment        Drug and Alcohol Use:   Tobacco use    Cigarettes:  former smoker    Tobacco use duration    Tobacco Cessation Readiness    Alcohol use    Alcohol use:  rare use    Alcohol Treatment Readiness   Illicit Drug Use    Drug use:  never        Diet & Exercise:   Diet   What is your diet?:  Regular   How many servings a day of the following:   Fruits and Vegetables:  3-4 Meat:  1-2   Whole Grains:  1 Simple Carbs:  0   Dairy:  3 Soda:  0   Coffee:  2 Tea:  0   Exercise    Do you currently exercise?:  yes    Minutes per day:  30    Minutes per week:  90   Minutes per month:  90        Cognitive Impairment Screening:   Depression screening preformed:  Yes     PHQ-9 Depression scale score:  0   Cognitive Impairment Screening        Functional Ability/Level of Safety:   Hearing     Bilateral:  slightly decreased   Left:  slightly decreased Right:  slightly decreased   Hearing aid:  No    Hearing Impairment Assessment    Current Activities    Status:  unlimited ADL's, unlimited social activities, unlimited driving   Help needed with the folllowing:    ADL    Fall Risk   Have you fallen in the last 12 months?:  Yes    How many times?:  1    Injury History       Home Safety:   Home Safety Risk Factors       Advanced Directives:   Advanced Directives    Living Will:  Yes Durable POA for healthcare:   Yes   Advanced directive:  Yes    Patient's End of Life Decisions        Urinary Incontinence:       Glaucoma:            Provider Screening     Preventative Screening/Counseling:   Cardiovascular Screening/Counseling:   (Labs Q5 years, EKG optional one-time)   General:  Risks and Benefits Discussed, Screening Current           Diabetes Screening/Counseling:   (2 tests/year if Pre-Diabetes or 1 test/year if no Diabetes)   General:  Risks and Benefits Discussed, Screening Current           Colorectal Cancer Screening/Counseling: (FOBT Q1 yr; Flex Sig Q4 yrs or Q10 yrs after Screening Colonoscopy; Screening Colonoscpy Q2 yrs High Risk or Q10 yrs Low Risk; Barium Enema Q2 yrs High Risk or Q4 yrs Low Risk)   General:  Risks and Benefits Discussed, Screening Current           Prostate Cancer Screening/Counseling:   (Annual)    General:  Risks and Benefits Discussed, Screening Current          Breast Cancer Screening/Counseling:   (Baseline Age 28 - 43; Annual Age 36+)         Cervical Cancer Screening/Counseling:   (Annual for High Risk or Childbearing Age with Abnormal Pap in Last 3 yrs; Every 2 all others)         Osteoporosis Screening/Counseling:   (Every 2 Yrs if at risk or more if medically necessary)         AAA Screening/Counseling:   (Once per Lifetime with risk factors)    Family History of AAA:  No  Tobacco use (males only):  No         Glaucoma Screening/Counseling:   (Annual)         HIV Screening/Counseling:   (Voluntary; Once annually for high risk OR 3 times for Pregnancy at diagnosis of IUP; 3rd trimester; and at Labor         Hepatitis C Screening:             Immunizations:   Influenza (annual):  Risks & Benefits Discussed, Influenza Recommended Annually   Pneumococcal (Once in a Lifetime):  Risks & Benefits Discussed, Lifetime Vaccine Completed       Other Preventative Couseling (Non-Medicare Wellness Visit Required):       Referrals (Non-Medicare Wellness Visit Required):       Medical Equipment/Suppliers:           No exam data present    Physical Exam :  Physical Exam    Reviewed Updated [facility] Prior Wellness Visits:   Last Medicare wellness visit information was reviewed, patient interviewed , no change since last AWVyes  Last Medicare wellness visit information was reviewed, patient interviewed and updates made to the record today yes    Assessment and Plan:  1  Medicare annual wellness visit, subsequent     2  Obsessive-compulsive disorder, unspecified type  FLUoxetine (PROzac) 40 MG capsule   3   Anxiety  FLUoxetine (PROzac) 40 MG capsule    ALPRAZolam (XANAX) 0 5 mg tablet   4  Impaired fasting glucose  HEMOGLOBIN A1C W/ EAG ESTIMATION    Low carb diet  5  Hyperlipidemia, unspecified hyperlipidemia type  CBC    Comprehensive metabolic panel    Low fat diet  6  Tension-type headache, not intractable, unspecified chronicity pattern  butalbital-acetaminophen-caffeine (FIORICET,ESGIC) -40 mg per tablet   7  Hypothyroidism, unspecified type  levothyroxine 75 mcg tablet    TSH, 3rd generation     MMSE 29/30 today  Had living will  Daughter Nino Mohs is his POA       Health Maintenance Due   Topic Date Due    GLAUCOMA SCREENING 67+ YR  01/13/2017    INFLUENZA VACCINE  09/01/2017    Annual Wellnes Visit (AWV)  09/29/2017

## 2018-04-19 NOTE — PROGRESS NOTES
Chief Complaint   Patient presents with   [facility] Wellness Visit     Annual wellness visit   Follow-up     6 month follow up for Hyperlipidemia, Hypothyroidism, Impaired fasting glucose, OCD (obsessive compulsive disorder), and Tension type headache  Health Maintenance   Topic Date Due    GLAUCOMA SCREENING 67+ YR  01/13/2017    INFLUENZA VACCINE  09/01/2017    Annual Wellnes Visit (AWV)  09/29/2017    TSH LEVEL  10/16/2018    Fall Risk  10/19/2018    Depression Screening PHQ-9  04/16/2019    HEMOGLOBIN A1C  04/16/2019    DTaP,Tdap,and Td Vaccines (2 - Td) 12/01/2022    COLONOSCOPY  03/28/2028    PNEUMOCOCCAL POLYSACCHARIDE VACCINE AGE 65 AND OVER  Completed     Assessment/Plan:    Increase xanax to 0 5mg Qd prn  SE educated pt  Continue prozac  FU therapist as scheduled  Give refill of fioricet  SE educated pt  Use only as needed  Reviewed lab in 4/2018  hgA1C 5 4 normal  Lipid 206/132/55/126 low fat diet  CMP ok  TSH normal  FU specialist as scheduled  Pneumovax 2010  Got hovodva52 in 9/2015  Colonoscopy 3/28/2018, repeat in 5 years  FU urology for PSA  RTO in 6 months with labs  Diagnoses and all orders for this visit:    Obsessive-compulsive disorder, unspecified type  -     FLUoxetine (PROzac) 40 MG capsule; Take 1 capsule (40 mg total) by mouth 2 (two) times a day for 90 days    Anxiety  -     FLUoxetine (PROzac) 40 MG capsule; Take 1 capsule (40 mg total) by mouth 2 (two) times a day for 90 days  -     ALPRAZolam (XANAX) 0 5 mg tablet; Take 1 tablet (0 5 mg total) by mouth daily as needed for anxiety for up to 30 days    Impaired fasting glucose  -     HEMOGLOBIN A1C W/ EAG ESTIMATION; Future    Hyperlipidemia, unspecified hyperlipidemia type  -     CBC; Future  -     Comprehensive metabolic panel; Future    Tension-type headache, not intractable, unspecified chronicity pattern  -     butalbital-acetaminophen-caffeine (FIORICET,ESGIC) -40 mg per tablet;  Take 1 tablet by mouth every 6 (six) hours as needed for headaches for up to 90 days    Hypothyroidism, unspecified type  -     levothyroxine 75 mcg tablet; Take 1 tablet (75 mcg total) by mouth daily for 90 days  -     TSH, 3rd generation; Future          Subjective:      Patient ID: Milady Nicholson is a 74 y o  male  HPI    Pt is here with wife  OCD--- He is on prozac 40mg daily  Feels little help  FU therapist Q 2 weeks  Would like to try higher dose  Anxiety---Prozac helped  He is on xanax 0 25mg daily  He feels it does not work sometimes  Would like to try a higher dose  Tension headache---He is on firocet 3-4/week which helped  Need refill  IFG---4/2018 hgA1C 5 4 normal  Hyperlipidemia---on niacin daily and diet control  Hypothyroidism---He is on levothyroxine 75mcg daily  Feels fine  FU neurology for memory loss/dementia regularly  Short memory loss is stable  Does not take any meds for it  FU 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  Stable  FU urology   [facility] for BPH  He is on [place] which helped  FU opthalmology yearly  Live with wife and son  Does all ADL's  Still drive  Denies recent falls  Denies depression  The following portions of the patient's history were reviewed and updated as appropriate: allergies, current medications, past family history, past medical history, past social history, past surgical history and problem list     Review of Systems   Constitutional: Negative for appetite change, chills and fever  HENT: Negative for congestion, ear pain, sinus pain and sore throat  Eyes: Negative for discharge and itching  Respiratory: Negative for apnea, cough, chest tightness, shortness of breath and wheezing  Cardiovascular: Negative for chest pain, palpitations and leg swelling  Gastrointestinal: Negative for abdominal pain, anal bleeding, constipation, diarrhea, nausea and vomiting     Endocrine: Negative for cold intolerance, heat intolerance and polyuria  Genitourinary: Negative for difficulty urinating and dysuria  Musculoskeletal: Negative for arthralgias, back pain and myalgias  Skin: Negative for rash  Neurological: Negative for dizziness and headaches  Psychiatric/Behavioral: Negative for agitation  Objective: There were no vitals taken for this visit  Physical Exam   Constitutional: He appears well-developed  HENT:   Head: Normocephalic and atraumatic  Eyes: Conjunctivae are normal  Pupils are equal, round, and reactive to light  Neck: Normal range of motion  Neck supple  Cardiovascular: Normal rate, regular rhythm, normal heart sounds and intact distal pulses  Pulmonary/Chest: Effort normal and breath sounds normal    Abdominal: Soft  Bowel sounds are normal    Musculoskeletal: Normal range of motion  Neurological: He is alert

## 2018-05-14 DIAGNOSIS — F41.9 ANXIETY: ICD-10-CM

## 2018-05-14 RX ORDER — ALPRAZOLAM 0.5 MG/1
0.5 TABLET ORAL EVERY 8 HOURS PRN
Qty: 90 TABLET | Refills: 0 | Status: SHIPPED | OUTPATIENT
Start: 2018-05-14 | End: 2018-05-18 | Stop reason: SDUPTHER

## 2018-05-15 ENCOUNTER — OFFICE VISIT (OUTPATIENT)
Dept: BEHAVIORAL/MENTAL HEALTH CLINIC | Facility: CLINIC | Age: 73
End: 2018-05-15
Payer: MEDICARE

## 2018-05-15 DIAGNOSIS — F42.2 MIXED OBSESSIONAL THOUGHTS AND ACTS: Primary | ICD-10-CM

## 2018-05-15 DIAGNOSIS — F42.9 OBSESSIVE-COMPULSIVE DISORDER, UNSPECIFIED TYPE: ICD-10-CM

## 2018-05-15 DIAGNOSIS — Z63.0 MARITAL DYSFUNCTION: ICD-10-CM

## 2018-05-15 PROCEDURE — 90834 PSYTX W PT 45 MINUTES: CPT | Performed by: SOCIAL WORKER

## 2018-05-15 SDOH — SOCIAL STABILITY - SOCIAL INSECURITY: PROBLEMS IN RELATIONSHIP WITH SPOUSE OR PARTNER: Z63.0

## 2018-05-18 DIAGNOSIS — F41.9 ANXIETY: ICD-10-CM

## 2018-05-18 RX ORDER — ALPRAZOLAM 0.5 MG/1
0.5 TABLET ORAL EVERY 8 HOURS PRN
Qty: 90 TABLET | Refills: 0 | Status: SHIPPED | OUTPATIENT
Start: 2018-05-18 | End: 2018-08-10 | Stop reason: SDUPTHER

## 2018-08-10 DIAGNOSIS — F41.9 ANXIETY: ICD-10-CM

## 2018-08-10 RX ORDER — ALPRAZOLAM 0.5 MG/1
0.5 TABLET ORAL EVERY 8 HOURS PRN
Qty: 90 TABLET | Refills: 0 | Status: SHIPPED | OUTPATIENT
Start: 2018-08-10 | End: 2018-08-31 | Stop reason: SDUPTHER

## 2018-08-14 DIAGNOSIS — G44.209 TENSION HEADACHE: Primary | ICD-10-CM

## 2018-08-14 RX ORDER — BUTALBITAL, ACETAMINOPHEN AND CAFFEINE 50; 325; 40 MG/1; MG/1; MG/1
1 TABLET ORAL EVERY 4 HOURS PRN
Qty: 100 TABLET | Refills: 0 | Status: SHIPPED | OUTPATIENT
Start: 2018-08-14 | End: 2018-10-30 | Stop reason: SDUPTHER

## 2018-08-15 ENCOUNTER — OFFICE VISIT (OUTPATIENT)
Dept: BEHAVIORAL/MENTAL HEALTH CLINIC | Facility: CLINIC | Age: 73
End: 2018-08-15
Payer: MEDICARE

## 2018-08-15 DIAGNOSIS — F41.9 ANXIETY: ICD-10-CM

## 2018-08-15 DIAGNOSIS — F42.9 OBSESSIVE-COMPULSIVE DISORDER, UNSPECIFIED TYPE: ICD-10-CM

## 2018-08-15 DIAGNOSIS — Z63.0 MARITAL DYSFUNCTION: ICD-10-CM

## 2018-08-15 DIAGNOSIS — F42.2 MIXED OBSESSIONAL THOUGHTS AND ACTS: Primary | ICD-10-CM

## 2018-08-15 PROCEDURE — 90834 PSYTX W PT 45 MINUTES: CPT | Performed by: SOCIAL WORKER

## 2018-08-15 SDOH — SOCIAL STABILITY - SOCIAL INSECURITY: PROBLEMS IN RELATIONSHIP WITH SPOUSE OR PARTNER: Z63.0

## 2018-08-15 NOTE — PSYCH
Treatment Plan Tracking    # The updatedTreatment Plan not completed within required time limits due to:the update was due on 5/25/18  The update was due on 5/25 and today was the first time he has been here since 5/25 and he wanted to talk  he also wants his wife present when he updates his goals  Radha Lopez

## 2018-08-15 NOTE — PSYCH
Psychotherapy Provided: Individual Psychotherapy 45 minutes     Length of time in session: 45 minutes, follow up in 2 week    Goals addressed in session: Goal 1 and Goal 2 and goal 3    Pain:      moderate to severe    0    Current suicide risk : Low     Data: Gilford Boom arrived for his session  He said both the good and the bad are all the same  His wife he shared does not lie but he shared she sees reality differently  He discussed the ups and downs of their relationship  Regarding his goals he is taking his meds, He is more mindful of how he treats and reacts to his wife  Regarding goal 3 he is monitoring his anger and he feels her does not explode  Assessment: We worked on mindfulness strategies  Plan: Continue to skill build in distress tolerance  Behavioral Health Treatment Plan [facility]: Diagnosis and Treatment Plan explained to Amber Norman relates understanding diagnosis and is agreeable to Treatment Plan   Yes

## 2018-08-21 ENCOUNTER — TELEPHONE (OUTPATIENT)
Dept: FAMILY MEDICINE CLINIC | Facility: CLINIC | Age: 73
End: 2018-08-21

## 2018-08-21 NOTE — TELEPHONE ENCOUNTER
We received a fax from [address] [place] [address] that they no longer provide prescription services for this patient, I called him and he now uses [facility]

## 2018-08-29 ENCOUNTER — OFFICE VISIT (OUTPATIENT)
Dept: BEHAVIORAL/MENTAL HEALTH CLINIC | Facility: CLINIC | Age: 73
End: 2018-08-29
Payer: MEDICARE

## 2018-08-29 DIAGNOSIS — F42.9 OBSESSIVE-COMPULSIVE DISORDER, UNSPECIFIED TYPE: ICD-10-CM

## 2018-08-29 DIAGNOSIS — F41.9 ANXIETY: ICD-10-CM

## 2018-08-29 DIAGNOSIS — Z63.0 MARITAL DYSFUNCTION: ICD-10-CM

## 2018-08-29 DIAGNOSIS — F42.2 MIXED OBSESSIONAL THOUGHTS AND ACTS: ICD-10-CM

## 2018-08-29 PROCEDURE — 90834 PSYTX W PT 45 MINUTES: CPT | Performed by: SOCIAL WORKER

## 2018-08-29 SDOH — SOCIAL STABILITY - SOCIAL INSECURITY: PROBLEMS IN RELATIONSHIP WITH SPOUSE OR PARTNER: Z63.0

## 2018-08-29 NOTE — PSYCH
Psychotherapy Provided: Individual Psychotherapy 45 minutes     Length of time in session: 45 minutes, follow up in 2 week    Goals addressed in session: Goal 1, Goal 2 and Goal 3      Pain:      moderate to severe    0    Current suicide risk : Low     Data:The client and his wife arrived for the session  His wife just had a hip replacement  We updated his recovery plan as he wanted her present to help with the goals since they relate and impact their relationship  She feels he is more considerate but still needs to work on his habits and his rituals  Her main concern is how easily he becomes overwhelmed when he feels something is not going well  She also feels he needs to cope with the normal process of life and he agrees all three goals are accurate  His wife basically mist manage everything  Assessment: The clients wife is so frustrated especially in regards to his 2nd and 3rd goal  We worked on strategies he could do to change this  Plan: Continue to help him make progress on these goals  Behavioral Health Treatment Plan [facility]: Diagnosis and Treatment Plan explained to Tresa Sanchez relates understanding diagnosis and is agreeable to Treatment Plan   Yes

## 2018-08-29 NOTE — PSYCH
Psychotherapy Provided: Individual Psychotherapy 45 minutes     Length of time in session: 45 minutes, follow up in 2 week    Goals addressed in session: Goal 1, Goal 2 and Goal 3      Pain:      moderate to severe    0    Current suicide risk : Low     Data: Today the client and his wife arrived for his session  Behavioral Health Treatment Plan [facility]: Diagnosis and Treatment Plan explained to Amber Emerson relates understanding diagnosis and is agreeable to Treatment Plan   Yes

## 2018-08-29 NOTE — PSYCH
Samir Ibarra  1945       Date of Initial Treatment Plan: 10/03/2017  Date of Current Treatment Plan: 08/29/18    Treatment Plan Number 2nd update     Strengths/Personal Resources for Self Care:     Diagnosis:   1  Anxiety     2  Marital dysfunction     3  Mixed obsessional thoughts and acts     4  Obsessive-compulsive disorder, unspecified type         Area of Needs: 1- I still need to decrease my habits and my rituals  2- He needs to decrease his easily overwhelmed attitude when things dont go well  3- He needs to deal with the normal process of life  Long Term Goal 1: I still need to decrease my habits and rituals  Target Date:12/29/2018  Completion Date: TBD         Short Term Objectives for Goal 1: He will continue medication compliance and therapy  Long Term Goal 2: I need to decrease my easily overwhelmed attitude when things dont go well    Target Date: 12/29/2018  Completion Date: TBD    Short Term Objectives for Goal 2: He will practice mindfulness and distress tolerance  Long Term Goal # 3:He will learn to deal with the normal process of life      Target Date: 12/29/2018  Completion Date: TBD    Short Term Objectives for Goal 3: He will practice mindfulness    GOAL 1: Modality: Individual 2x per month   Completion Date TBD    GOAL 2: Modality: Individual 2x per month   Completion Date TBD     GOAL 3: Modality: Individual 2x per month   Completion Date TBD      Behavioral Health Treatment Plan  [facility]: Diagnosis and Treatment Plan explained to Carline Branch relates understanding diagnosis and is agreeable to Treatment Plan         Client Comments : Please share your thoughts, feelings, need and/or experiences regarding your treatment plan: __________________________________________________________________    __________________________________________________________________    __________________________________________________________________    __________________________________________________________________    _______________________________________                Patient signature, Date Time: __________________________________________             Physician cosigner signature, Date, Time: ________________________________

## 2018-08-31 ENCOUNTER — OFFICE VISIT (OUTPATIENT)
Dept: PSYCHIATRY | Facility: CLINIC | Age: 73
End: 2018-08-31
Payer: MEDICARE

## 2018-08-31 VITALS — WEIGHT: 166 LBS | BODY MASS INDEX: 24.16 KG/M2

## 2018-08-31 DIAGNOSIS — F41.9 ANXIETY: ICD-10-CM

## 2018-08-31 DIAGNOSIS — F42.9 OBSESSIVE-COMPULSIVE DISORDER, UNSPECIFIED TYPE: ICD-10-CM

## 2018-08-31 PROBLEM — F60.5 OBSESSIVE COMPULSIVE PERSONALITY DISORDER (HCC): Status: ACTIVE | Noted: 2018-08-31

## 2018-08-31 PROCEDURE — 90792 PSYCH DIAG EVAL W/MED SRVCS: CPT | Performed by: PSYCHIATRY & NEUROLOGY

## 2018-08-31 RX ORDER — FLUOXETINE HYDROCHLORIDE 40 MG/1
40 CAPSULE ORAL 2 TIMES DAILY
Qty: 180 CAPSULE | Refills: 1 | Status: SHIPPED | OUTPATIENT
Start: 2018-08-31 | End: 2018-11-02 | Stop reason: SDUPTHER

## 2018-08-31 RX ORDER — ALPRAZOLAM 0.5 MG/1
.25-.5 TABLET ORAL EVERY 8 HOURS PRN
Qty: 90 TABLET | Refills: 0 | Status: SHIPPED | OUTPATIENT
Start: 2018-08-31 | End: 2018-11-02 | Stop reason: SDUPTHER

## 2018-08-31 RX ORDER — BUSPIRONE HYDROCHLORIDE 5 MG/1
TABLET ORAL
Qty: 360 TABLET | Refills: 1 | Status: SHIPPED | OUTPATIENT
Start: 2018-08-31 | End: 2018-11-02 | Stop reason: SDUPTHER
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
## 2018-10-04 ENCOUNTER — OFFICE VISIT (OUTPATIENT)
Dept: NEUROLOGY | Facility: CLINIC | Age: 73
End: 2018-10-04
Payer: MEDICARE

## 2018-10-04 VITALS
WEIGHT: 168 LBS | HEART RATE: 58 BPM | SYSTOLIC BLOOD PRESSURE: 175 MMHG | RESPIRATION RATE: 14 BRPM | DIASTOLIC BLOOD PRESSURE: 79 MMHG | BODY MASS INDEX: 24.05 KG/M2 | HEIGHT: 70 IN

## 2018-10-04 DIAGNOSIS — G31.84 MILD COGNITIVE IMPAIRMENT: Primary | ICD-10-CM

## 2018-10-04 PROCEDURE — 99213 OFFICE O/P EST LOW 20 MIN: CPT | Performed by: PSYCHIATRY & NEUROLOGY

## 2018-10-04 NOTE — PROGRESS NOTES
Patient ID: Dale Herrera is a 74 y o  male  Assessment/Plan:    Mild cognitive impairment  Overall doing well  Symptoms are stable  No changes needed  Diagnoses and all orders for this visit:    Mild cognitive impairment           Subjective:    Mr Tyler Fitzgerald is a 74year old male with mild cognitive deficits who presents for follow up  To review, his wife states symptoms began gradually in 2011 after he retired and have slowly worsened  She described him as always having behavioral issues, being racist and possessive  He has short and long term difficulty  His wife has handled the finances since they went into bankruptcy in 1997  He sleeps well on Tylenol PM and sometimes Xanax  He denies any issues depression or anxiety  He is on Prozac for OCD  His mother had dementia in her 80's and had OCD  He also has a history of frontal headaches for which he takes Fiorcet about 5 times a week  MRI revealed mild atrophy with early microvascular disease and neuroquant imaging consistent with neurodegenerative process  Neuropsych testing was consistent with mild cognitive impairment with no clinically significant anxiety or depression  He had difficulty with auditory and visual vigilance  His B12 was elevated in the past  His wife has noticed some improvement of his OCD since starting therapy and increasing his fluoxetine dose     He feels he is doing well  He has not noticed any progression in his forgetfulness  He reports forgetting to do things that his wife ask him to do on occasion  Most of the time this is due to him doing something else (ie  tv) while she is asking him to do things  He is still functioning well on issues with ADL's  His wife managed finances the past 15 years  He sleeps well  He likes to read, do Sudoku and jumbo cross word puzzles  He resides with his wife and their son  He has no concerns  He is seeing a psychologist for his OCD and is on fluoxetine         The following portions of the patient's history were reviewed and updated as appropriate: allergies, current medications, past family history, past medical history, past social history and past surgical history  Objective:    Blood pressure (!) 175/79, pulse 58, resp  rate 14, height 5' 9 5" (1 765 m), weight 76 2 kg (168 lb)  Physical Exam   Eyes: Pupils are equal, round, and reactive to light  EOM are normal    Neurological: He has normal strength  Psychiatric: His speech is normal        Neurological Exam  Mental Status   Oriented to person, place, time and situation  Recalls 2 of 3 objects immediately  Speech is normal  Language is fluent with no aphasia  Able to name objects  Attention and concentration are normal   MOCA 23  Cranial Nerves  CN II: Visual fields full to confrontation  CN III, IV, VI: Extraocular movements intact bilaterally  Pupils equal round and reactive to light bilaterally  CN V: Facial sensation is normal   CN VII: Full and symmetric facial movement  CN VIII: Hearing is normal   CN IX, X: Palate elevates symmetrically  CN XI: Shoulder shrug strength is normal   CN XII: Tongue midline without atrophy or fasciculations  Motor   Normal muscle tone  Strength is 5/5 throughout all four extremities  Sensory  Light touch is normal in upper and lower extremities  Reflexes  Glabellar tap absent  Coordination  Right: Finger-to-nose normal   Left: Finger-to-nose normal     Gait  Casual gait is normal including stance, stride, and arm swing  ROS:    Review of Systems   Constitutional: Negative  Negative for appetite change and fever  HENT: Negative  Negative for hearing loss, tinnitus, trouble swallowing and voice change  Eyes: Negative  Negative for photophobia and pain  Respiratory: Negative  Negative for shortness of breath  Cardiovascular: Negative  Negative for palpitations  Gastrointestinal: Negative  Negative for nausea and vomiting  Endocrine: Negative  Negative for cold intolerance and heat intolerance  Genitourinary: Negative  Negative for dysuria, frequency and urgency  Musculoskeletal: Negative  Negative for myalgias and neck pain  Skin: Negative  Negative for rash  Neurological: Positive for headaches  Negative for dizziness, tremors, seizures, syncope, facial asymmetry, speech difficulty, weakness, light-headedness and numbness  Hematological: Negative  Does not bruise/bleed easily  Psychiatric/Behavioral: Negative  Negative for confusion, hallucinations and sleep disturbance          Memory problems

## 2018-10-22 ENCOUNTER — OFFICE VISIT (OUTPATIENT)
Dept: BEHAVIORAL/MENTAL HEALTH CLINIC | Facility: CLINIC | Age: 73
End: 2018-10-22
Payer: MEDICARE

## 2018-10-22 DIAGNOSIS — G31.84 MILD COGNITIVE IMPAIRMENT: ICD-10-CM

## 2018-10-22 DIAGNOSIS — F42.9 OBSESSIVE-COMPULSIVE DISORDER, UNSPECIFIED TYPE: ICD-10-CM

## 2018-10-22 DIAGNOSIS — Z63.0 MARITAL DYSFUNCTION: ICD-10-CM

## 2018-10-22 DIAGNOSIS — F42.2 MIXED OBSESSIONAL THOUGHTS AND ACTS: Primary | ICD-10-CM

## 2018-10-22 PROCEDURE — 90834 PSYTX W PT 45 MINUTES: CPT | Performed by: SOCIAL WORKER

## 2018-10-22 SDOH — SOCIAL STABILITY - SOCIAL INSECURITY: PROBLEMS IN RELATIONSHIP WITH SPOUSE OR PARTNER: Z63.0

## 2018-10-22 NOTE — PSYCH
Psychotherapy Provided: Individual Psychotherapy 45 minutes     Length of time in session: 45 minutes, follow up in 2 week    Goals addressed in session: Goal 1, Goal 2 and Goal 3      Pain:      moderate to severe    0    Current suicide risk : Low     Data: Tamiko Morgan arrived for his appointment  Regarding his goals he at least now recognizes his habits and rituals and at least once he recognizes it he can stop it  We discussed his second goal that he wants to decrease his getting easily overwhelmed under stressful situations  We discussed strategies to deal with this  He discussed he does not like unexpected stressful situations  We discussed no body does but we discussed strategies to cope when they dont go right  Regarding goal 3 he does have difficulty dealing with little things in life and dealing with the normal process in life  Assessment: He does feel overall better but he is not sure how his wife would feel  He is feeling better about his addictive son who has cleaned up his act  Plan: Continue to skill build in distress tolerance and to help him decrease his habits and rituals  Behavioral Health Treatment Plan [facility]: Diagnosis and Treatment Plan explained to Miguel Ibarra relates understanding diagnosis and is agreeable to Treatment Plan   Yes

## 2018-10-26 LAB
ALBUMIN SERPL-MCNC: 4.3 G/DL (ref 3.6–5.1)
ALBUMIN/GLOB SERPL: 2 (CALC) (ref 1–2.5)
ALP SERPL-CCNC: 61 U/L (ref 40–115)
ALT SERPL-CCNC: 16 U/L (ref 9–46)
AST SERPL-CCNC: 15 U/L (ref 10–35)
BASOPHILS # BLD AUTO: 58 CELLS/UL (ref 0–200)
BASOPHILS NFR BLD AUTO: 1.1 %
BILIRUB SERPL-MCNC: 0.6 MG/DL (ref 0.2–1.2)
BUN SERPL-MCNC: 18 MG/DL (ref 7–25)
BUN/CREAT SERPL: NORMAL (CALC) (ref 6–22)
CALCIUM SERPL-MCNC: 9.6 MG/DL (ref 8.6–10.3)
CHLORIDE SERPL-SCNC: 102 MMOL/L (ref 98–110)
CO2 SERPL-SCNC: 30 MMOL/L (ref 20–32)
CREAT SERPL-MCNC: 0.95 MG/DL (ref 0.7–1.18)
EOSINOPHIL # BLD AUTO: 318 CELLS/UL (ref 15–500)
EOSINOPHIL NFR BLD AUTO: 6 %
ERYTHROCYTE [DISTWIDTH] IN BLOOD BY AUTOMATED COUNT: 12.3 % (ref 11–15)
EST. AVERAGE GLUCOSE BLD GHB EST-MCNC: 108 (CALC)
EST. AVERAGE GLUCOSE BLD GHB EST-SCNC: 6 (CALC)
GLOBULIN SER CALC-MCNC: 2.1 G/DL (CALC) (ref 1.9–3.7)
GLUCOSE SERPL-MCNC: 83 MG/DL (ref 65–99)
HBA1C MFR BLD: 5.4 % OF TOTAL HGB
HCT VFR BLD AUTO: 42.1 % (ref 38.5–50)
HGB BLD-MCNC: 14.6 G/DL (ref 13.2–17.1)
LYMPHOCYTES # BLD AUTO: 922 CELLS/UL (ref 850–3900)
LYMPHOCYTES NFR BLD AUTO: 17.4 %
MCH RBC QN AUTO: 33.8 PG (ref 27–33)
MCHC RBC AUTO-ENTMCNC: 34.7 G/DL (ref 32–36)
MCV RBC AUTO: 97.5 FL (ref 80–100)
MONOCYTES # BLD AUTO: 583 CELLS/UL (ref 200–950)
MONOCYTES NFR BLD AUTO: 11 %
NEUTROPHILS # BLD AUTO: 3419 CELLS/UL (ref 1500–7800)
NEUTROPHILS NFR BLD AUTO: 64.5 %
PLATELET # BLD AUTO: 289 THOUSAND/UL (ref 140–400)
PMV BLD REES-ECKER: 9.2 FL (ref 7.5–12.5)
POTASSIUM SERPL-SCNC: 4.7 MMOL/L (ref 3.5–5.3)
PROT SERPL-MCNC: 6.4 G/DL (ref 6.1–8.1)
RBC # BLD AUTO: 4.32 MILLION/UL (ref 4.2–5.8)
SL AMB EGFR AFRICAN AMERICAN: 90 ML/MIN/1.73M2
SL AMB EGFR NON AFRICAN AMERICAN: 78 ML/MIN/1.73M2
SODIUM SERPL-SCNC: 138 MMOL/L (ref 135–146)
TSH SERPL-ACNC: 2.95 MIU/L (ref 0.4–4.5)
WBC # BLD AUTO: 5.3 THOUSAND/UL (ref 3.8–10.8)

## 2018-10-30 ENCOUNTER — OFFICE VISIT (OUTPATIENT)
Dept: FAMILY MEDICINE CLINIC | Facility: CLINIC | Age: 73
End: 2018-10-30
Payer: MEDICARE

## 2018-10-30 VITALS
TEMPERATURE: 97.8 F | DIASTOLIC BLOOD PRESSURE: 78 MMHG | HEIGHT: 70 IN | HEART RATE: 68 BPM | WEIGHT: 169 LBS | SYSTOLIC BLOOD PRESSURE: 150 MMHG | RESPIRATION RATE: 16 BRPM | BODY MASS INDEX: 24.2 KG/M2

## 2018-10-30 DIAGNOSIS — F41.9 ANXIETY: ICD-10-CM

## 2018-10-30 DIAGNOSIS — E78.5 HYPERLIPIDEMIA, UNSPECIFIED HYPERLIPIDEMIA TYPE: ICD-10-CM

## 2018-10-30 DIAGNOSIS — G44.209 TENSION HEADACHE: ICD-10-CM

## 2018-10-30 DIAGNOSIS — E03.9 HYPOTHYROIDISM, UNSPECIFIED TYPE: Primary | ICD-10-CM

## 2018-10-30 DIAGNOSIS — G44.209 TENSION-TYPE HEADACHE, NOT INTRACTABLE, UNSPECIFIED CHRONICITY PATTERN: ICD-10-CM

## 2018-10-30 DIAGNOSIS — R73.01 IMPAIRED FASTING GLUCOSE: ICD-10-CM

## 2018-10-30 PROCEDURE — 99214 OFFICE O/P EST MOD 30 MIN: CPT | Performed by: FAMILY MEDICINE

## 2018-10-30 RX ORDER — BUTALBITAL, ACETAMINOPHEN AND CAFFEINE 50; 325; 40 MG/1; MG/1; MG/1
1 TABLET ORAL EVERY 4 HOURS PRN
Qty: 100 TABLET | Refills: 0 | Status: SHIPPED | OUTPATIENT
Start: 2018-10-30 | End: 2019-01-28

## 2018-10-30 NOTE — PROGRESS NOTES
Chief Complaint   Patient presents with    Follow-up     6 month follow up  Health Maintenance   Topic Date Due    Fall Risk  04/19/2019   Shawna Elizabeth Medicare Annual Wellness Visit (AWV)  04/19/2019    Depression Screening PHQ  05/15/2019    HEMOGLOBIN A1C  10/25/2019    DTaP,Tdap,and Td Vaccines (2 - Td) 12/01/2022    CRC Screening: Colonoscopy  03/28/2028    INFLUENZA VACCINE  Completed    Pneumococcal PPSV23/PCV13 65+ Years / Low and Medium Risk  Completed     Assessment/Plan:  Reviewed lab in 10/2018  hgA1C 5 4 normal  CBC ok  CMP ok  TSH normal    Hypothyroidism---stable  Continue levothyroxine  IFG---controlled  Low carb diet  Hyperlipidemia---diet control  Low fat diet  Anxiety---FU psychiatry  Tension headache---Use fioricet as needed, not everyday  Educated pt about side effects  Got flu shot at pharmacy this season per pt  Pneumovax 2010 when he was 77  Got [id number] in 9/2015  Tdap 2012  Will check insurance for coverage of Tdap and shingrix  Colonoscopy 3/28/2018, repeat in 5 years  FU urology for PSA  RTO in 6 months with labs  Diagnoses and all orders for this visit:    Hypothyroidism, unspecified type    Impaired fasting glucose    Hyperlipidemia, unspecified hyperlipidemia type    Anxiety    Tension-type headache, not intractable, unspecified chronicity pattern    Tension headache  -     butalbital-acetaminophen-caffeine (FIORICET,ESGIC) -40 mg per tablet; Take 1 tablet by mouth every 4 (four) hours as needed for headaches for up to 90 days          Subjective:      Patient ID: Ramsey Rico is a 74 y o  male  HPI     Pt is here by himself  Hypothyroidism---He is on levothyroxine 75mcg daily  Feels fine  IFG---10/2018 hgA1C 5 4 normal  Hyperlipidemia---on niacin daily and diet control  OCD--- He is on prozac 40mg bid and buspar 5mg tid  Feels little help  FU psychiatrist Q 3 months now  FU therapist Q 2 weeks  Anxiety---Prozac helped   He is on xanax 0 5mg daily as needed       Tension headache---use fioricet as needed, maybe 3-4 times per week  FU neurology for headache yearly       FU 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  Stable  FU urology Dr Terry Hannon for BPH  He is on [place] which helped  FU opthalmology yearly       Live with wife and son  Does all ADL's  Still drive  Denies recent falls           The following portions of the patient's history were reviewed and updated as appropriate: allergies, current medications, past family history, past medical history, past social history, past surgical history and problem list     Review of Systems   Constitutional: Negative for appetite change, chills and fever  HENT: Negative for congestion, ear pain, sinus pain and sore throat  Eyes: Negative for discharge and itching  Respiratory: Negative for apnea, cough, chest tightness, shortness of breath and wheezing  Cardiovascular: Negative for chest pain, palpitations and leg swelling  Gastrointestinal: Negative for abdominal pain, anal bleeding, constipation, diarrhea, nausea and vomiting  Endocrine: Negative for cold intolerance, heat intolerance and polyuria  Genitourinary: Negative for difficulty urinating and dysuria  Musculoskeletal: Negative for arthralgias, back pain and myalgias  Skin: Negative for rash  Neurological: Negative for dizziness and headaches  Psychiatric/Behavioral: Negative for agitation  Objective:      /78 (BP Location: Left arm, Patient Position: Sitting, Cuff Size: Standard)   Pulse 68   Temp 97 8 °F (36 6 °C) (Tympanic)   Resp 16   Ht 5' 9 5" (1 765 m)   Wt 76 7 kg (169 lb)   BMI 24 60 kg/m²          Physical Exam   Constitutional: He appears well-developed  HENT:   Head: Normocephalic and atraumatic  Eyes: Pupils are equal, round, and reactive to light  Conjunctivae are normal    Neck: Normal range of motion  Neck supple     Cardiovascular: Normal rate, regular rhythm, normal heart sounds and intact distal pulses  Pulmonary/Chest: Effort normal and breath sounds normal    Abdominal: Soft  Bowel sounds are normal    Musculoskeletal: Normal range of motion  Neurological: He is alert

## 2018-11-02 ENCOUNTER — OFFICE VISIT (OUTPATIENT)
Dept: PSYCHIATRY | Facility: CLINIC | Age: 73
End: 2018-11-02
Payer: MEDICARE

## 2018-11-02 DIAGNOSIS — F42.9 OBSESSIVE-COMPULSIVE DISORDER, UNSPECIFIED TYPE: ICD-10-CM

## 2018-11-02 DIAGNOSIS — F60.5 OBSESSIVE COMPULSIVE PERSONALITY DISORDER (HCC): Primary | ICD-10-CM

## 2018-11-02 DIAGNOSIS — F41.9 ANXIETY: ICD-10-CM

## 2018-11-02 PROBLEM — F42.2 MIXED OBSESSIONAL THOUGHTS AND ACTS: Status: RESOLVED | Noted: 2018-02-16 | Resolved: 2018-11-02

## 2018-11-02 PROCEDURE — 99214 OFFICE O/P EST MOD 30 MIN: CPT | Performed by: PSYCHIATRY & NEUROLOGY

## 2018-11-02 RX ORDER — BUSPIRONE HYDROCHLORIDE 5 MG/1
TABLET ORAL
Qty: 540 TABLET | Refills: 1 | Status: SHIPPED | OUTPATIENT
Start: 2018-11-02 | End: 2018-11-05 | Stop reason: SDUPTHER

## 2018-11-02 RX ORDER — ALPRAZOLAM 0.5 MG/1
.25-.5 TABLET ORAL EVERY 8 HOURS PRN
Qty: 90 TABLET | Refills: 2 | Status: SHIPPED | OUTPATIENT
Start: 2018-11-02 | End: 2019-04-05 | Stop reason: SDUPTHER

## 2018-11-02 RX ORDER — FLUOXETINE HYDROCHLORIDE 40 MG/1
40 CAPSULE ORAL 2 TIMES DAILY
Qty: 180 CAPSULE | Refills: 1 | Status: SHIPPED | OUTPATIENT
Start: 2018-11-02 | End: 2019-01-07 | Stop reason: SDUPTHER
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
## 2018-11-05 DIAGNOSIS — F42.9 OBSESSIVE-COMPULSIVE DISORDER, UNSPECIFIED TYPE: ICD-10-CM

## 2018-11-05 DIAGNOSIS — F41.9 ANXIETY: ICD-10-CM

## 2018-11-05 RX ORDER — BUSPIRONE HYDROCHLORIDE 5 MG/1
TABLET ORAL
Qty: 540 TABLET | Refills: 0 | Status: SHIPPED | OUTPATIENT
Start: 2018-11-05 | End: 2018-11-07 | Stop reason: SDUPTHER

## 2018-11-07 DIAGNOSIS — F41.9 ANXIETY: ICD-10-CM

## 2018-11-07 DIAGNOSIS — F42.9 OBSESSIVE-COMPULSIVE DISORDER, UNSPECIFIED TYPE: ICD-10-CM

## 2018-11-07 RX ORDER — BUSPIRONE HYDROCHLORIDE 5 MG/1
TABLET ORAL
Qty: 540 TABLET | Refills: 0 | Status: SHIPPED | OUTPATIENT
Start: 2018-11-07 | End: 2019-01-07 | Stop reason: SDUPTHER

## 2018-11-19 ENCOUNTER — TELEPHONE (OUTPATIENT)
Dept: BEHAVIORAL/MENTAL HEALTH CLINIC | Facility: CLINIC | Age: 73
End: 2018-11-19

## 2018-11-19 NOTE — TELEPHONE ENCOUNTER
Pharmacy said all the mg of buspirone is on back order and they do not know when it will be coming in   If you can give something else please call it in

## 2018-11-20 NOTE — TELEPHONE ENCOUNTER
Called back and left VM to check alternative pharmacies [facility] could p/u buspirone at instead of a [facility]

## 2018-11-28 NOTE — TELEPHONE ENCOUNTER
Checked with Yecenia Cuba this AM  He does not need Buspar as he has "over 100 tablets from [facility]" and will not need for "a while " Reminded him to call in advance when he does need refill as alternative pharmacy may be needed if [facility] backorder continues

## 2019-01-07 ENCOUNTER — OFFICE VISIT (OUTPATIENT)
Dept: PSYCHIATRY | Facility: CLINIC | Age: 74
End: 2019-01-07
Payer: MEDICARE

## 2019-01-07 DIAGNOSIS — F42.9 OBSESSIVE-COMPULSIVE DISORDER, UNSPECIFIED TYPE: ICD-10-CM

## 2019-01-07 DIAGNOSIS — F41.9 ANXIETY: Primary | ICD-10-CM

## 2019-01-07 PROCEDURE — 99213 OFFICE O/P EST LOW 20 MIN: CPT | Performed by: PSYCHIATRY & NEUROLOGY

## 2019-01-07 RX ORDER — BUSPIRONE HYDROCHLORIDE 10 MG/1
10 TABLET ORAL 3 TIMES DAILY
Qty: 270 TABLET | Refills: 1 | Status: SHIPPED | OUTPATIENT
Start: 2019-01-07 | End: 2019-04-05 | Stop reason: SDUPTHER

## 2019-01-07 RX ORDER — FLUOXETINE HYDROCHLORIDE 40 MG/1
40 CAPSULE ORAL 2 TIMES DAILY
Qty: 180 CAPSULE | Refills: 1 | Status: SHIPPED | OUTPATIENT
Start: 2019-01-07 | End: 2019-04-05 | Stop reason: SDUPTHER
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
## 2019-02-21 ENCOUNTER — TELEPHONE (OUTPATIENT)
Dept: NEUROLOGY | Facility: CLINIC | Age: 74
End: 2019-02-21

## 2019-04-04 LAB
LEFT EYE DIABETIC RETINOPATHY: NORMAL
RIGHT EYE DIABETIC RETINOPATHY: NORMAL

## 2019-04-05 ENCOUNTER — OFFICE VISIT (OUTPATIENT)
Dept: PSYCHIATRY | Facility: CLINIC | Age: 74
End: 2019-04-05
Payer: MEDICARE

## 2019-04-05 DIAGNOSIS — F60.5 OBSESSIVE COMPULSIVE PERSONALITY DISORDER (HCC): Primary | ICD-10-CM

## 2019-04-05 DIAGNOSIS — F41.9 ANXIETY: ICD-10-CM

## 2019-04-05 PROBLEM — F42.9 OCD (OBSESSIVE COMPULSIVE DISORDER): Status: RESOLVED | Noted: 2017-06-20 | Resolved: 2019-04-05

## 2019-04-05 PROCEDURE — 90833 PSYTX W PT W E/M 30 MIN: CPT | Performed by: PSYCHIATRY & NEUROLOGY

## 2019-04-05 PROCEDURE — 99213 OFFICE O/P EST LOW 20 MIN: CPT | Performed by: PSYCHIATRY & NEUROLOGY

## 2019-04-05 RX ORDER — FLUOXETINE HYDROCHLORIDE 40 MG/1
80 CAPSULE ORAL DAILY
Qty: 180 CAPSULE | Refills: 1 | Status: SHIPPED | OUTPATIENT
Start: 2019-04-05 | End: 2020-12-03 | Stop reason: SDUPTHER

## 2019-04-05 RX ORDER — BUSPIRONE HYDROCHLORIDE 10 MG/1
10 TABLET ORAL 3 TIMES DAILY
Qty: 270 TABLET | Refills: 1 | Status: SHIPPED | OUTPATIENT
Start: 2019-04-05 | End: 2020-12-03 | Stop reason: ALTCHOICE

## 2019-04-05 RX ORDER — ALPRAZOLAM 0.5 MG/1
.25-.5 TABLET ORAL EVERY 8 HOURS PRN
Qty: 90 TABLET | Refills: 2 | Status: SHIPPED | OUTPATIENT
Start: 2019-04-05 | End: 2019-11-12 | Stop reason: SDUPTHER

## 2019-05-01 LAB
ALBUMIN SERPL-MCNC: 4.1 G/DL (ref 3.6–5.1)
ALBUMIN/GLOB SERPL: 1.9 (CALC) (ref 1–2.5)
ALP SERPL-CCNC: 49 U/L (ref 40–115)
ALT SERPL-CCNC: 19 U/L (ref 9–46)
AST SERPL-CCNC: 12 U/L (ref 10–35)
BILIRUB SERPL-MCNC: 0.5 MG/DL (ref 0.2–1.2)
BUN SERPL-MCNC: 20 MG/DL (ref 7–25)
BUN/CREAT SERPL: NORMAL (CALC) (ref 6–22)
CALCIUM SERPL-MCNC: 9.3 MG/DL (ref 8.6–10.3)
CHLORIDE SERPL-SCNC: 103 MMOL/L (ref 98–110)
CHOLEST SERPL-MCNC: 192 MG/DL
CHOLEST/HDLC SERPL: 3.4 (CALC)
CO2 SERPL-SCNC: 29 MMOL/L (ref 20–32)
CREAT SERPL-MCNC: 1.01 MG/DL (ref 0.7–1.18)
GLOBULIN SER CALC-MCNC: 2.2 G/DL (CALC) (ref 1.9–3.7)
GLUCOSE SERPL-MCNC: 94 MG/DL (ref 65–99)
HBA1C MFR BLD: 5.7 % OF TOTAL HGB
HDLC SERPL-MCNC: 56 MG/DL
LDLC SERPL CALC-MCNC: 117 MG/DL (CALC)
NONHDLC SERPL-MCNC: 136 MG/DL (CALC)
POTASSIUM SERPL-SCNC: 4.3 MMOL/L (ref 3.5–5.3)
PROT SERPL-MCNC: 6.3 G/DL (ref 6.1–8.1)
SL AMB EGFR AFRICAN AMERICAN: 83 ML/MIN/1.73M2
SL AMB EGFR NON AFRICAN AMERICAN: 72 ML/MIN/1.73M2
SODIUM SERPL-SCNC: 137 MMOL/L (ref 135–146)
TRIGL SERPL-MCNC: 93 MG/DL
TSH SERPL-ACNC: 1.94 MIU/L (ref 0.4–4.5)

## 2019-05-03 ENCOUNTER — OFFICE VISIT (OUTPATIENT)
Dept: FAMILY MEDICINE CLINIC | Facility: CLINIC | Age: 74
End: 2019-05-03
Payer: MEDICARE

## 2019-05-03 VITALS
BODY MASS INDEX: 24.94 KG/M2 | SYSTOLIC BLOOD PRESSURE: 142 MMHG | HEIGHT: 69 IN | WEIGHT: 168.4 LBS | RESPIRATION RATE: 16 BRPM | TEMPERATURE: 96.3 F | HEART RATE: 68 BPM | DIASTOLIC BLOOD PRESSURE: 78 MMHG

## 2019-05-03 DIAGNOSIS — G44.209 TENSION-TYPE HEADACHE, NOT INTRACTABLE, UNSPECIFIED CHRONICITY PATTERN: Primary | ICD-10-CM

## 2019-05-03 DIAGNOSIS — E78.5 HYPERLIPIDEMIA, UNSPECIFIED HYPERLIPIDEMIA TYPE: ICD-10-CM

## 2019-05-03 DIAGNOSIS — E03.9 HYPOTHYROIDISM, UNSPECIFIED TYPE: ICD-10-CM

## 2019-05-03 DIAGNOSIS — E66.3 OVERWEIGHT (BMI 25.0-29.9): ICD-10-CM

## 2019-05-03 DIAGNOSIS — Z00.00 MEDICARE ANNUAL WELLNESS VISIT, SUBSEQUENT: ICD-10-CM

## 2019-05-03 DIAGNOSIS — F41.9 ANXIETY: ICD-10-CM

## 2019-05-03 DIAGNOSIS — R73.01 IMPAIRED FASTING GLUCOSE: ICD-10-CM

## 2019-05-03 PROCEDURE — G0439 PPPS, SUBSEQ VISIT: HCPCS | Performed by: FAMILY MEDICINE

## 2019-05-03 PROCEDURE — 99214 OFFICE O/P EST MOD 30 MIN: CPT | Performed by: FAMILY MEDICINE

## 2019-05-03 RX ORDER — LEVOTHYROXINE SODIUM 0.07 MG/1
75 TABLET ORAL DAILY
Qty: 90 TABLET | Refills: 3 | Status: SHIPPED | OUTPATIENT
Start: 2019-05-03 | End: 2019-11-12 | Stop reason: SDUPTHER

## 2019-05-03 RX ORDER — BUTALBITAL, ACETAMINOPHEN AND CAFFEINE 50; 325; 40 MG/1; MG/1; MG/1
1 TABLET ORAL EVERY 6 HOURS PRN
Qty: 100 TABLET | Refills: 1 | Status: SHIPPED | OUTPATIENT
Start: 2019-05-03 | End: 2019-08-01

## 2019-10-09 ENCOUNTER — OFFICE VISIT (OUTPATIENT)
Dept: NEUROLOGY | Facility: CLINIC | Age: 74
End: 2019-10-09
Payer: MEDICARE

## 2019-10-09 VITALS
BODY MASS INDEX: 24.79 KG/M2 | SYSTOLIC BLOOD PRESSURE: 165 MMHG | WEIGHT: 167.4 LBS | HEIGHT: 69 IN | DIASTOLIC BLOOD PRESSURE: 77 MMHG | HEART RATE: 69 BPM

## 2019-10-09 DIAGNOSIS — G31.84 MILD COGNITIVE IMPAIRMENT: Primary | ICD-10-CM

## 2019-10-09 PROCEDURE — 99214 OFFICE O/P EST MOD 30 MIN: CPT | Performed by: PHYSICIAN ASSISTANT

## 2019-10-09 RX ORDER — CEPHALEXIN 500 MG/1
1 CAPSULE ORAL DAILY
COMMUNITY
Start: 2019-08-19 | End: 2020-12-03 | Stop reason: ALTCHOICE

## 2019-10-09 RX ORDER — BUTALBITAL, ACETAMINOPHEN AND CAFFEINE 50; 325; 40 MG/1; MG/1; MG/1
TABLET ORAL AS NEEDED
COMMUNITY
Start: 2019-09-28 | End: 2019-11-12 | Stop reason: SDUPTHER

## 2019-10-09 NOTE — ASSESSMENT & PLAN NOTE
No evidence of progression since last year on MoCA testing (23/30)  He is still functioning well at home  He is able to perform all of his ADLs independently  He is still driving without issues  Will not make any changes at this time and will continue to monitor for evidence of progression with time  He was encouraged to keep his mind stimulated

## 2019-10-09 NOTE — PROGRESS NOTES
Patient ID: Kaylie Goncalves is a 66 y o  male  Assessment/Plan:    Mild cognitive impairment  No evidence of progression since last year on MoCA testing (23/30)  He is still functioning well at home  He is able to perform all of his ADLs independently  He is still driving without issues  Will not make any changes at this time and will continue to monitor for evidence of progression with time  He was encouraged to keep his mind stimulated  Subjective:    Mr Concepcion Borjas is a 66year old male with mild cognitive deficits who presents for follow up  To review, his wife states symptoms began gradually in 2011 after he retired and have slowly worsened  She described him as always having behavioral issues, being racist and possessive  He has short and long term difficulty  His wife has handled the finances since they went into bankruptcy in 1997  He sleeps well on Tylenol PM and sometimes Xanax  He denies any issues depression or anxiety  He is on Prozac for OCD  His mother had dementia in her 80's and had OCD  He also has a history of frontal headaches for which he takes Fiorcet about 5 times a week  MRI revealed mild atrophy with early microvascular disease and neuroquant imaging consistent with neurodegenerative process  Neuropsych testing was consistent with mild cognitive impairment with no clinically significant anxiety or depression  He had difficulty with auditory and visual vigilance  His B12 was elevated in the past  His wife has noticed some improvement of his OCD since starting therapy and increasing his fluoxetine dose  At his last visit he was doing well and no changes were made  He continues to follow with a psychologist for his OCD and remains on fluoxetine, buspar and xanax  He feels that his OCD is overall well controlled  He feels that he is overall doing well    He denies any progression of his memory loss however his wife does notice that he will often have to ask the same questions  He is not sure if this is just related to him not paying attention  He does notice this and does not feel that this is getting any worse with time  He will wear hearing aids when watching tv however he does not wear them when out in public  He is not sure if perhaps he does not hear her at times  He is still driving without issues  He denies getting lost or having any accidents  He is able to preform all his ADLs on his own  He likes to read the newspaper, do puzzles, word searches and games on the computer  He and his wife will visit with friends and family  He likes to go out to breakfast on weekends  He manages his own medications  No falls other than tripping over the cat  He feels that he is sleeping well  I personally reviewed and updated the ROS  Objective:    Blood pressure 165/77, pulse 69, height 5' 8 75" (1 746 m), weight 75 9 kg (167 lb 6 4 oz)  Physical Exam   Constitutional: He appears well-developed and well-nourished  HENT:   Right Ear: Hearing normal    Left Ear: Hearing normal    Eyes: Pupils are equal, round, and reactive to light  EOM are normal    Pulmonary/Chest: Effort normal    Neurological: He is alert  He has normal strength  Psychiatric: His speech is normal        Neurological Exam  Mental Status  Awake and alert  Oriented to person, place, time and situation  Recalls 3 of 3 objects immediately  (Delayed recall 1/5) Able to copy figure  Clock drawing is normal  Speech is normal  Language is fluent with no aphasia  Able to name objects  Able to perform serial calculations  MoCA 23/30 - 10/9/19     MoCA 23/30 - 10/4/18  Cranial Nerves  CN III, IV, VI: Extraocular movements intact bilaterally  Pupils equal round and reactive to light bilaterally  CN V:  Right: Facial sensation is normal   Left: Facial sensation is normal on the left  CN VII:  Right: There is no facial weakness  Left: There is no facial weakness    CN VIII:  Right: Hearing is normal   Left: Hearing is normal   CN IX, X: Palate elevates symmetrically  CN XI: Shoulder shrug strength is normal   CN XII: Tongue midline without atrophy or fasciculations  Motor   Strength is 5/5 throughout all four extremities  Sensory  Light touch is normal in upper and lower extremities  Reflexes  Glabellar tap absent  Coordination  Right: Finger-to-nose normal   Left: Finger-to-nose normal     Gait  Casual gait is normal including stance, stride, and arm swing  Able to rise from chair without using arms  ROS:    Review of Systems   Constitutional: Negative  Negative for appetite change and fever  HENT: Negative  Negative for hearing loss, tinnitus, trouble swallowing and voice change  Eyes: Negative  Negative for photophobia and pain  Respiratory: Negative  Negative for shortness of breath  Cardiovascular: Negative  Negative for palpitations  Gastrointestinal: Negative  Negative for nausea and vomiting  Endocrine: Negative  Negative for cold intolerance and heat intolerance  Genitourinary: Negative  Negative for dysuria, frequency and urgency  Musculoskeletal: Negative  Negative for myalgias and neck pain  Skin: Negative  Negative for rash  Neurological: Negative  Negative for dizziness, tremors, seizures, syncope, facial asymmetry, speech difficulty, weakness, light-headedness, numbness and headaches  Patient states that his memory has stayed the same since his last follow up visit   Hematological: Negative  Does not bruise/bleed easily  Psychiatric/Behavioral: Negative  Negative for confusion, hallucinations and sleep disturbance

## 2019-11-08 LAB
ALBUMIN SERPL-MCNC: 4.1 G/DL (ref 3.6–5.1)
ALBUMIN/GLOB SERPL: 2.1 (CALC) (ref 1–2.5)
ALP SERPL-CCNC: 51 U/L (ref 40–115)
ALT SERPL-CCNC: 13 U/L (ref 9–46)
AST SERPL-CCNC: 12 U/L (ref 10–35)
BASOPHILS # BLD AUTO: 69 CELLS/UL (ref 0–200)
BASOPHILS NFR BLD AUTO: 1.4 %
BILIRUB SERPL-MCNC: 0.5 MG/DL (ref 0.2–1.2)
BUN SERPL-MCNC: 20 MG/DL (ref 7–25)
BUN/CREAT SERPL: NORMAL (CALC) (ref 6–22)
CALCIUM SERPL-MCNC: 9.5 MG/DL (ref 8.6–10.3)
CHLORIDE SERPL-SCNC: 103 MMOL/L (ref 98–110)
CHOLEST SERPL-MCNC: 176 MG/DL
CHOLEST/HDLC SERPL: 3.8 (CALC)
CO2 SERPL-SCNC: 31 MMOL/L (ref 20–32)
CREAT SERPL-MCNC: 1.06 MG/DL (ref 0.7–1.18)
EOSINOPHIL # BLD AUTO: 240 CELLS/UL (ref 15–500)
EOSINOPHIL NFR BLD AUTO: 4.9 %
ERYTHROCYTE [DISTWIDTH] IN BLOOD BY AUTOMATED COUNT: 12.2 % (ref 11–15)
GLOBULIN SER CALC-MCNC: 2 G/DL (CALC) (ref 1.9–3.7)
GLUCOSE SERPL-MCNC: 85 MG/DL (ref 65–99)
HBA1C MFR BLD: 5.6 % OF TOTAL HGB
HCT VFR BLD AUTO: 41.9 % (ref 38.5–50)
HDLC SERPL-MCNC: 46 MG/DL
HGB BLD-MCNC: 14.6 G/DL (ref 13.2–17.1)
LDLC SERPL CALC-MCNC: 106 MG/DL (CALC)
LYMPHOCYTES # BLD AUTO: 911 CELLS/UL (ref 850–3900)
LYMPHOCYTES NFR BLD AUTO: 18.6 %
MCH RBC QN AUTO: 34.2 PG (ref 27–33)
MCHC RBC AUTO-ENTMCNC: 34.8 G/DL (ref 32–36)
MCV RBC AUTO: 98.1 FL (ref 80–100)
MONOCYTES # BLD AUTO: 441 CELLS/UL (ref 200–950)
MONOCYTES NFR BLD AUTO: 9 %
NEUTROPHILS # BLD AUTO: 3239 CELLS/UL (ref 1500–7800)
NEUTROPHILS NFR BLD AUTO: 66.1 %
NONHDLC SERPL-MCNC: 130 MG/DL (CALC)
PLATELET # BLD AUTO: 302 THOUSAND/UL (ref 140–400)
PMV BLD REES-ECKER: 9.3 FL (ref 7.5–12.5)
POTASSIUM SERPL-SCNC: 4.8 MMOL/L (ref 3.5–5.3)
PROT SERPL-MCNC: 6.1 G/DL (ref 6.1–8.1)
RBC # BLD AUTO: 4.27 MILLION/UL (ref 4.2–5.8)
SL AMB EGFR AFRICAN AMERICAN: 79 ML/MIN/1.73M2
SL AMB EGFR NON AFRICAN AMERICAN: 68 ML/MIN/1.73M2
SODIUM SERPL-SCNC: 138 MMOL/L (ref 135–146)
TRIGL SERPL-MCNC: 125 MG/DL
TSH SERPL-ACNC: 2.38 MIU/L (ref 0.4–4.5)
WBC # BLD AUTO: 4.9 THOUSAND/UL (ref 3.8–10.8)

## 2019-11-12 ENCOUNTER — OFFICE VISIT (OUTPATIENT)
Dept: FAMILY MEDICINE CLINIC | Facility: CLINIC | Age: 74
End: 2019-11-12
Payer: MEDICARE

## 2019-11-12 VITALS
RESPIRATION RATE: 16 BRPM | WEIGHT: 168.8 LBS | SYSTOLIC BLOOD PRESSURE: 136 MMHG | HEART RATE: 72 BPM | OXYGEN SATURATION: 97 % | HEIGHT: 69 IN | TEMPERATURE: 97.4 F | DIASTOLIC BLOOD PRESSURE: 78 MMHG | BODY MASS INDEX: 25 KG/M2

## 2019-11-12 DIAGNOSIS — F41.9 ANXIETY: ICD-10-CM

## 2019-11-12 DIAGNOSIS — R73.01 IMPAIRED FASTING GLUCOSE: ICD-10-CM

## 2019-11-12 DIAGNOSIS — E03.9 HYPOTHYROIDISM, UNSPECIFIED TYPE: Primary | ICD-10-CM

## 2019-11-12 DIAGNOSIS — E78.5 HYPERLIPIDEMIA, UNSPECIFIED HYPERLIPIDEMIA TYPE: ICD-10-CM

## 2019-11-12 DIAGNOSIS — R51.9 CHRONIC INTRACTABLE HEADACHE, UNSPECIFIED HEADACHE TYPE: ICD-10-CM

## 2019-11-12 DIAGNOSIS — G89.29 CHRONIC INTRACTABLE HEADACHE, UNSPECIFIED HEADACHE TYPE: ICD-10-CM

## 2019-11-12 PROCEDURE — 99214 OFFICE O/P EST MOD 30 MIN: CPT | Performed by: FAMILY MEDICINE

## 2019-11-12 RX ORDER — BUTALBITAL, ACETAMINOPHEN AND CAFFEINE 50; 325; 40 MG/1; MG/1; MG/1
TABLET ORAL AS NEEDED
Qty: 90 TABLET | Refills: 1 | Status: CANCELLED | OUTPATIENT
Start: 2019-11-12 | End: 2020-02-10

## 2019-11-12 RX ORDER — LEVOTHYROXINE SODIUM 0.07 MG/1
75 TABLET ORAL DAILY
Qty: 90 TABLET | Refills: 1 | Status: SHIPPED | OUTPATIENT
Start: 2019-11-12 | End: 2020-06-15 | Stop reason: SDUPTHER

## 2019-11-12 RX ORDER — ALPRAZOLAM 0.5 MG/1
.25-.5 TABLET ORAL EVERY 8 HOURS PRN
Qty: 90 TABLET | Refills: 1 | Status: CANCELLED | OUTPATIENT
Start: 2019-11-12

## 2019-11-12 RX ORDER — BUTALBITAL, ACETAMINOPHEN AND CAFFEINE 50; 325; 40 MG/1; MG/1; MG/1
1 TABLET ORAL DAILY PRN
Qty: 90 TABLET | Refills: 1 | Status: SHIPPED | OUTPATIENT
Start: 2019-11-12 | End: 2020-06-04 | Stop reason: SDUPTHER

## 2019-11-12 RX ORDER — ALPRAZOLAM 0.5 MG/1
0.5 TABLET ORAL
Qty: 90 TABLET | Refills: 1 | Status: SHIPPED | OUTPATIENT
Start: 2019-11-12 | End: 2020-03-11 | Stop reason: SDUPTHER

## 2019-11-12 NOTE — PROGRESS NOTES
Chief Complaint   Patient presents with    Follow-up     6 months and review labs  Health Maintenance   Topic Date Due    INFLUENZA VACCINE  07/01/2019    Fall Risk  05/03/2020    Depression Screening PHQ  05/03/2020    Medicare Annual Wellness Visit (AWV)  05/03/2020    BMI: Adult  10/09/2020    DTaP,Tdap,and Td Vaccines (2 - Td) 12/01/2022    CRC Screening: Colonoscopy  03/28/2028    Pneumococcal Vaccine: 65+ Years  Completed    Pneumococcal Vaccine: Pediatrics (0 to 5 Years) and At-Risk Patients (6 to 59 Years)  Aged Out    HEPATITIS B VACCINES  Aged Out     BMI Counseling: Body mass index is 25 11 kg/m²  The BMI is above normal  Nutrition recommendations include reducing portion sizes, decreasing overall calorie intake and 3-5 servings of fruits/vegetables daily  Exercise recommendations include strength training exercises  Assessment/Plan:    Hypothyroidism  11/2019 TSH normal  Continue levothyroxine 75mcg daily  Impaired fasting glucose  11/2019 hgA1C 5 6 normal  Low carb diet  Benign prostatic hyperplasia  [facility] urology  Continue Lolly  Anxiety  Pt refused to follow up with psychiatrist now  Continue prozac 80mg QD, buspar 10mg tid and xanax 0 5mg qhs as needed  SE of xanax educated pt  Only use as needed  PDMP checked and it was appropriate  Hyperlipidemia  11/2019 Lipid 176/125/46/106 ok  Low fat diet  Tension type headache  FU neurology  Use fioricet 3-4 times per week  SE educated pt  Reviewed lab in 11/2019  CBC ok  CMP ok  Lipid 176/125/46/106  TSH Normal  hgA1C 5 6 normal     Get flu shot yearly  Pneumovax 2010 when he was 77  Got [id number] in 9/2015    Tdap 2012    Refused shingrix    Colonoscopy 3/28/2018, repeat in 5 years  [facility] urology for PSA  RTO in 6 months with labs        POA---daughter   Living will----Full code          Diagnoses and all orders for this visit:    Hypothyroidism, unspecified type  -     levothyroxine 75 mcg tablet;  Take 1 tablet (75 mcg total) by mouth daily  -     TSH, 3rd generation with Free T4 reflex; Future    Impaired fasting glucose  -     Comprehensive metabolic panel; Future  -     Hemoglobin A1C With EAG; Future    Anxiety  -     ALPRAZolam (XANAX) 0 5 mg tablet; Take 1 tablet (0 5 mg total) by mouth daily at bedtime as needed for anxiety    Chronic intractable headache, unspecified headache type  -     butalbital-acetaminophen-caffeine (FIORICET,ESGIC) -40 mg per tablet; Take 1 tablet by mouth daily as needed for headaches    Hyperlipidemia, unspecified hyperlipidemia type    BMI 25 0-25 9,adult    Other orders  -     Cancel: ALPRAZolam (XANAX) 0 5 mg tablet; Take 0 5-1 tablets (0 25-0 5 mg total) by mouth every 8 (eight) hours as needed for anxiety  -     Cancel: butalbital-acetaminophen-caffeine (FIORICET,ESGIC) -40 mg per tablet; as needed          Subjective:      Patient ID: Rossy Dubois is a 66 y o  male  HPI    Pt is here with his wife  Hypothyroidism---He is on levothyroxine 75mcg daily  Feels fine       IFG---follows low carb diet  Hyperlipidemia---follows low fat diet  Not on statins  Tension headache---use fioricet as needed, maybe 3-4 times per week  FU neurology for headache yearly    Memory loss---mild, follows neurology yearly  Stable       OCD---Stable  He is on prozac 80mg QD and buspar 10 mg tid  FU psychiatrist and therapist before, not any more  Would like me to refill his medications  Anxiety---He is on xanax 0 5mg 1-3 times per day as needed       [facility] urology   MED [facility] for BPH  He is on Rumsey which helped  FU opthalmology yearly       Live with wife and son  Does all ADL's  Still drive  Denies recent falls         The following portions of the patient's history were reviewed and updated as appropriate: allergies, current medications, past family history, past medical history, past social history, past surgical history and problem list     Review of Systems   Constitutional: Negative for appetite change, chills, fever and unexpected weight change  HENT: Negative for congestion, ear pain, sinus pain and sore throat  Eyes: Negative for discharge and itching  Respiratory: Negative for apnea, cough, chest tightness, shortness of breath and wheezing  Cardiovascular: Negative for chest pain, palpitations and leg swelling  Gastrointestinal: Negative for abdominal pain, anal bleeding, constipation, diarrhea, nausea and vomiting  Endocrine: Negative for cold intolerance, heat intolerance and polyuria  Genitourinary: Negative for difficulty urinating and dysuria  Musculoskeletal: Negative for arthralgias, back pain and myalgias  Skin: Negative for rash  Neurological: Negative for dizziness and headaches  Psychiatric/Behavioral: Negative for agitation  Objective:      /78 (BP Location: Left arm, Patient Position: Sitting, Cuff Size: Adult)   Pulse 72   Temp (!) 97 4 °F (36 3 °C) (Tympanic)   Resp 16   Ht 5' 8 75" (1 746 m)   Wt 76 6 kg (168 lb 12 8 oz)   SpO2 97%   BMI 25 11 kg/m²          Physical Exam   Constitutional: He appears well-developed  HENT:   Head: Normocephalic and atraumatic  Eyes: Pupils are equal, round, and reactive to light  Conjunctivae are normal    Neck: Normal range of motion  Neck supple  Cardiovascular: Normal rate, regular rhythm, normal heart sounds and intact distal pulses  Exam reveals no gallop and no friction rub  No murmur heard  Pulmonary/Chest: Effort normal and breath sounds normal  No stridor  No respiratory distress  He has no wheezes  He has no rales  He exhibits no tenderness  Abdominal: Soft  Bowel sounds are normal  He exhibits no distension and no mass  There is no tenderness  There is no rebound and no guarding  Musculoskeletal: Normal range of motion  He exhibits no edema  Neurological: He is alert

## 2020-03-11 DIAGNOSIS — F41.9 ANXIETY: ICD-10-CM

## 2020-03-11 RX ORDER — ALPRAZOLAM 0.5 MG/1
0.5 TABLET ORAL
Qty: 90 TABLET | Refills: 1 | Status: SHIPPED | OUTPATIENT
Start: 2020-03-11 | End: 2020-06-04 | Stop reason: SDUPTHER

## 2020-05-22 NOTE — ASSESSMENT & PLAN NOTE
11/2019 Lipid 176/125/46/106 ok  Low fat diet 
11/2019 TSH normal  Continue levothyroxine 75mcg daily 
11/2019 hgA1C 5 6 normal  Low carb diet 
FU neurology  Use fioricet 3-4 times per week  SE educated pt 
[facility]  Continue Lolly 
Pt refused to follow up with psychiatrist now  Continue prozac 80mg QD, buspar 10mg tid and xanax 0 5mg qhs as needed  SE of xanax educated pt  Only use as needed  PDMP checked and it was appropriate 
No

## 2020-05-27 LAB — HBA1C MFR BLD HPLC: 5.7 %

## 2020-06-04 ENCOUNTER — OFFICE VISIT (OUTPATIENT)
Dept: FAMILY MEDICINE CLINIC | Facility: CLINIC | Age: 75
End: 2020-06-04
Payer: MEDICARE

## 2020-06-04 VITALS
OXYGEN SATURATION: 98 % | HEIGHT: 69 IN | SYSTOLIC BLOOD PRESSURE: 150 MMHG | HEART RATE: 84 BPM | WEIGHT: 163.6 LBS | BODY MASS INDEX: 24.23 KG/M2 | RESPIRATION RATE: 20 BRPM | TEMPERATURE: 96.3 F | DIASTOLIC BLOOD PRESSURE: 80 MMHG

## 2020-06-04 DIAGNOSIS — R03.0 ELEVATED BP WITHOUT DIAGNOSIS OF HYPERTENSION: ICD-10-CM

## 2020-06-04 DIAGNOSIS — Z00.00 MEDICARE ANNUAL WELLNESS VISIT, SUBSEQUENT: ICD-10-CM

## 2020-06-04 DIAGNOSIS — F41.9 ANXIETY: ICD-10-CM

## 2020-06-04 DIAGNOSIS — G89.29 CHRONIC INTRACTABLE HEADACHE, UNSPECIFIED HEADACHE TYPE: ICD-10-CM

## 2020-06-04 DIAGNOSIS — E78.5 HYPERLIPIDEMIA, UNSPECIFIED HYPERLIPIDEMIA TYPE: ICD-10-CM

## 2020-06-04 DIAGNOSIS — E03.9 HYPOTHYROIDISM, UNSPECIFIED TYPE: Primary | ICD-10-CM

## 2020-06-04 DIAGNOSIS — R73.01 IMPAIRED FASTING GLUCOSE: ICD-10-CM

## 2020-06-04 DIAGNOSIS — R51.9 CHRONIC INTRACTABLE HEADACHE, UNSPECIFIED HEADACHE TYPE: ICD-10-CM

## 2020-06-04 PROCEDURE — 3008F BODY MASS INDEX DOCD: CPT | Performed by: FAMILY MEDICINE

## 2020-06-04 PROCEDURE — 1170F FXNL STATUS ASSESSED: CPT | Performed by: FAMILY MEDICINE

## 2020-06-04 PROCEDURE — 1125F AMNT PAIN NOTED PAIN PRSNT: CPT | Performed by: FAMILY MEDICINE

## 2020-06-04 PROCEDURE — 99214 OFFICE O/P EST MOD 30 MIN: CPT | Performed by: FAMILY MEDICINE

## 2020-06-04 PROCEDURE — 1036F TOBACCO NON-USER: CPT | Performed by: FAMILY MEDICINE

## 2020-06-04 PROCEDURE — 4040F PNEUMOC VAC/ADMIN/RCVD: CPT | Performed by: FAMILY MEDICINE

## 2020-06-04 PROCEDURE — 1160F RVW MEDS BY RX/DR IN RCRD: CPT | Performed by: FAMILY MEDICINE

## 2020-06-04 PROCEDURE — G0439 PPPS, SUBSEQ VISIT: HCPCS | Performed by: FAMILY MEDICINE

## 2020-06-04 RX ORDER — ALPRAZOLAM 0.5 MG/1
0.5 TABLET ORAL
Qty: 90 TABLET | Refills: 1 | Status: SHIPPED | OUTPATIENT
Start: 2020-06-04 | End: 2020-12-14 | Stop reason: SDUPTHER

## 2020-06-04 RX ORDER — BUTALBITAL, ACETAMINOPHEN AND CAFFEINE 50; 325; 40 MG/1; MG/1; MG/1
1 TABLET ORAL DAILY PRN
Qty: 90 TABLET | Refills: 1 | Status: SHIPPED | OUTPATIENT
Start: 2020-06-04 | End: 2020-12-03 | Stop reason: SDUPTHER

## 2020-06-15 DIAGNOSIS — E03.9 HYPOTHYROIDISM, UNSPECIFIED TYPE: ICD-10-CM

## 2020-06-15 RX ORDER — LEVOTHYROXINE SODIUM 0.07 MG/1
75 TABLET ORAL DAILY
Qty: 90 TABLET | Refills: 1 | Status: SHIPPED | OUTPATIENT
Start: 2020-06-15 | End: 2020-12-03 | Stop reason: SDUPTHER

## 2020-10-15 ENCOUNTER — OFFICE VISIT (OUTPATIENT)
Dept: NEUROLOGY | Facility: CLINIC | Age: 75
End: 2020-10-15
Payer: MEDICARE

## 2020-10-15 VITALS
BODY MASS INDEX: 24.44 KG/M2 | TEMPERATURE: 97.3 F | WEIGHT: 165 LBS | DIASTOLIC BLOOD PRESSURE: 65 MMHG | HEIGHT: 69 IN | SYSTOLIC BLOOD PRESSURE: 138 MMHG | HEART RATE: 79 BPM

## 2020-10-15 DIAGNOSIS — F03.90 MILD DEMENTIA (HCC): Primary | ICD-10-CM

## 2020-10-15 PROBLEM — F03.A0 MILD DEMENTIA: Status: ACTIVE | Noted: 2020-10-15

## 2020-10-15 PROCEDURE — 99214 OFFICE O/P EST MOD 30 MIN: CPT | Performed by: PSYCHIATRY & NEUROLOGY

## 2020-10-15 RX ORDER — DONEPEZIL HYDROCHLORIDE 10 MG/1
10 TABLET, FILM COATED ORAL
Qty: 90 TABLET | Refills: 1 | Status: SHIPPED | OUTPATIENT
Start: 2020-11-15 | End: 2021-04-11 | Stop reason: SDUPTHER

## 2020-10-15 RX ORDER — DONEPEZIL HYDROCHLORIDE 5 MG/1
5 TABLET, FILM COATED ORAL
Qty: 30 TABLET | Refills: 0 | Status: SHIPPED | OUTPATIENT
Start: 2020-10-15 | End: 2020-12-03 | Stop reason: SDUPTHER

## 2020-11-23 LAB — HBA1C MFR BLD HPLC: 5.8 %

## 2020-12-03 ENCOUNTER — OFFICE VISIT (OUTPATIENT)
Dept: FAMILY MEDICINE CLINIC | Facility: CLINIC | Age: 75
End: 2020-12-03
Payer: MEDICARE

## 2020-12-03 VITALS
HEIGHT: 69 IN | TEMPERATURE: 97.2 F | SYSTOLIC BLOOD PRESSURE: 158 MMHG | BODY MASS INDEX: 24.97 KG/M2 | HEART RATE: 72 BPM | OXYGEN SATURATION: 97 % | WEIGHT: 168.6 LBS | DIASTOLIC BLOOD PRESSURE: 82 MMHG | RESPIRATION RATE: 16 BRPM

## 2020-12-03 DIAGNOSIS — F03.90 MILD DEMENTIA (HCC): ICD-10-CM

## 2020-12-03 DIAGNOSIS — E87.5 HYPERKALEMIA: Primary | ICD-10-CM

## 2020-12-03 DIAGNOSIS — G44.209 TENSION-TYPE HEADACHE, NOT INTRACTABLE, UNSPECIFIED CHRONICITY PATTERN: ICD-10-CM

## 2020-12-03 DIAGNOSIS — E03.9 HYPOTHYROIDISM, UNSPECIFIED TYPE: ICD-10-CM

## 2020-12-03 DIAGNOSIS — F41.9 ANXIETY: ICD-10-CM

## 2020-12-03 DIAGNOSIS — G89.29 CHRONIC INTRACTABLE HEADACHE, UNSPECIFIED HEADACHE TYPE: ICD-10-CM

## 2020-12-03 DIAGNOSIS — R73.01 IMPAIRED FASTING GLUCOSE: ICD-10-CM

## 2020-12-03 DIAGNOSIS — R51.9 CHRONIC INTRACTABLE HEADACHE, UNSPECIFIED HEADACHE TYPE: ICD-10-CM

## 2020-12-03 DIAGNOSIS — E78.5 HYPERLIPIDEMIA, UNSPECIFIED HYPERLIPIDEMIA TYPE: ICD-10-CM

## 2020-12-03 DIAGNOSIS — M76.62 ACHILLES TENDINITIS OF LEFT LOWER EXTREMITY: ICD-10-CM

## 2020-12-03 DIAGNOSIS — I10 ESSENTIAL HYPERTENSION: ICD-10-CM

## 2020-12-03 PROCEDURE — 99214 OFFICE O/P EST MOD 30 MIN: CPT | Performed by: FAMILY MEDICINE

## 2020-12-03 RX ORDER — HYDROCHLOROTHIAZIDE 12.5 MG/1
12.5 TABLET ORAL DAILY
Qty: 30 TABLET | Refills: 5 | Status: SHIPPED | OUTPATIENT
Start: 2020-12-03 | End: 2021-06-10 | Stop reason: ALTCHOICE

## 2020-12-03 RX ORDER — BUTALBITAL, ACETAMINOPHEN AND CAFFEINE 50; 325; 40 MG/1; MG/1; MG/1
1 TABLET ORAL DAILY PRN
Qty: 90 TABLET | Refills: 1 | Status: SHIPPED | OUTPATIENT
Start: 2020-12-03 | End: 2021-06-10 | Stop reason: SDUPTHER

## 2020-12-03 RX ORDER — LEVOTHYROXINE SODIUM 0.07 MG/1
75 TABLET ORAL DAILY
Qty: 90 TABLET | Refills: 1 | Status: SHIPPED | OUTPATIENT
Start: 2020-12-03 | End: 2021-06-10 | Stop reason: SDUPTHER

## 2020-12-03 RX ORDER — FLUOXETINE HYDROCHLORIDE 40 MG/1
80 CAPSULE ORAL DAILY
Qty: 180 CAPSULE | Refills: 1 | Status: SHIPPED | OUTPATIENT
Start: 2020-12-03 | End: 2021-06-10 | Stop reason: ALTCHOICE

## 2020-12-14 DIAGNOSIS — F41.9 ANXIETY: ICD-10-CM

## 2020-12-14 RX ORDER — ALPRAZOLAM 0.5 MG/1
0.5 TABLET ORAL
Qty: 90 TABLET | Refills: 0 | Status: SHIPPED | OUTPATIENT
Start: 2020-12-14 | End: 2021-05-12 | Stop reason: SDUPTHER

## 2021-01-19 ENCOUNTER — TELEPHONE (OUTPATIENT)
Dept: FAMILY MEDICINE CLINIC | Facility: CLINIC | Age: 76
End: 2021-01-19

## 2021-01-25 ENCOUNTER — IMMUNIZATIONS (OUTPATIENT)
Dept: FAMILY MEDICINE CLINIC | Facility: HOSPITAL | Age: 76
End: 2021-01-25

## 2021-01-25 DIAGNOSIS — Z23 ENCOUNTER FOR IMMUNIZATION: Primary | ICD-10-CM

## 2021-01-25 PROCEDURE — 91300 SARS-COV-2 / COVID-19 MRNA VACCINE (PFIZER-BIONTECH) 30 MCG: CPT

## 2021-01-25 PROCEDURE — 0001A SARS-COV-2 / COVID-19 MRNA VACCINE (PFIZER-BIONTECH) 30 MCG: CPT

## 2021-02-15 ENCOUNTER — IMMUNIZATIONS (OUTPATIENT)
Dept: FAMILY MEDICINE CLINIC | Facility: HOSPITAL | Age: 76
End: 2021-02-15

## 2021-02-15 DIAGNOSIS — Z23 ENCOUNTER FOR IMMUNIZATION: Primary | ICD-10-CM

## 2021-02-15 PROCEDURE — 91300 SARS-COV-2 / COVID-19 MRNA VACCINE (PFIZER-BIONTECH) 30 MCG: CPT

## 2021-02-15 PROCEDURE — 0002A SARS-COV-2 / COVID-19 MRNA VACCINE (PFIZER-BIONTECH) 30 MCG: CPT

## 2021-04-11 DIAGNOSIS — F03.90 MILD DEMENTIA (HCC): ICD-10-CM

## 2021-04-11 RX ORDER — DONEPEZIL HYDROCHLORIDE 10 MG/1
TABLET, FILM COATED ORAL
Qty: 90 TABLET | Refills: 3 | Status: SHIPPED | OUTPATIENT
Start: 2021-04-11 | End: 2021-04-15 | Stop reason: SDUPTHER

## 2021-04-15 ENCOUNTER — OFFICE VISIT (OUTPATIENT)
Dept: NEUROLOGY | Facility: CLINIC | Age: 76
End: 2021-04-15
Payer: MEDICARE

## 2021-04-15 VITALS
SYSTOLIC BLOOD PRESSURE: 134 MMHG | HEART RATE: 65 BPM | BODY MASS INDEX: 25.29 KG/M2 | DIASTOLIC BLOOD PRESSURE: 66 MMHG | WEIGHT: 168.8 LBS

## 2021-04-15 DIAGNOSIS — F03.90 MILD DEMENTIA (HCC): ICD-10-CM

## 2021-04-15 PROCEDURE — 99214 OFFICE O/P EST MOD 30 MIN: CPT | Performed by: PHYSICIAN ASSISTANT

## 2021-04-15 RX ORDER — DONEPEZIL HYDROCHLORIDE 10 MG/1
10 TABLET, FILM COATED ORAL
Qty: 90 TABLET | Refills: 3 | Status: SHIPPED | OUTPATIENT
Start: 2021-04-15 | End: 2021-11-18 | Stop reason: SDUPTHER

## 2021-04-15 NOTE — PROGRESS NOTES
Patient ID: Darby Miranda is a 64 y o  male  Assessment/Plan:    Mild dementia ([facility] [address] )  Patient with mild dementia  He was started on Aricept at the last visit and has been doing well  He scored a 17/30 on MoCA which was the same as his last visit  He is still able to perform his ADLs  He is still driving and we discussed that if he were to continue to progress then will need to have a FTD evaluation  For now will remain on Aricept 10mg daily  Patient was encouraged to increase mind stimulating activities such as reading, crosswords, word searches, puzzles, Soduku, solitaire, coloring and other brain games  We also discussed the importance of staying physically active and eating a health diet such as the Mediterranean or MIND diet  Subjective:    Mr Kate Cooley is a male with mild dementia who presents for follow up  To review, his wife states symptoms began gradually in 2011 after he retired and have slowly worsened  She described him as always having behavioral issues, being racist and possessive  He has short and long term difficulty  His wife has handled the finances since they went into bankruptcy in 1997  He sleeps well on Tylenol PM and sometimes Xanax  He denies any issues depression or anxiety  He is on Prozac for OCD  His mother had dementia in her 80's and had OCD  He also has a history of frontal headaches for which he takes Fiorcet about 5 times a week  MRI revealed mild atrophy with early microvascular disease and neuroquant imaging consistent with neurodegenerative process  Neuropsych testing was consistent with mild cognitive impairment with no clinically significant anxiety or depression  He had difficulty with auditory and visual vigilance  At his last visit he had some progression, MoCA 17/30  He was started on Aricept  INTERVAL HISTORY:   He is overall doing well  No on Aricept without any side effects  Patient feels that he is overall stable   He does struggle with his hearing and the wife feels that this causes a lot of his issues  He refuses to wear the hearing aids  He is still driving  He feels that he is diving well  He has always been a very fast   The wife does not have any concerns with his driving in regards to his memory  He is sleeping well  He is able to perform his ADLs on his own  I personally reviewed and updated the ROS  Objective:    Blood pressure 134/66, pulse 65, weight 76 6 kg (168 lb 12 8 oz)  Physical Exam  Constitutional:       General: He is awake  HENT:      Right Ear: Hearing normal       Left Ear: Hearing normal    Eyes:      Extraocular Movements: EOM normal       Pupils: Pupils are equal, round, and reactive to light  Pulmonary:      Effort: Pulmonary effort is normal    Neurological:      Mental Status: He is alert  Deep Tendon Reflexes: Strength normal    Psychiatric:         Speech: Speech normal          Neurological Exam  Mental Status  Awake and alert  Oriented to person, place, time and situation  Recalls 3 of 3 objects immediately  (Delayed recall 1/5) Unable to copy figure  Clock drawing is normal  Speech is normal  Language is fluent with no aphasia  Able to name objects  Able to perform serial calculations  MoCA 17/30 - 4/15/21    MoCA 17/30 - 10/15/20  MoCA 23/30 - 10/9/19   MoCA 23/30 - 10/4/18  Cranial Nerves  CN III, IV, VI: Extraocular movements intact bilaterally  Pupils equal round and reactive to light bilaterally  CN V:  Right: Facial sensation is normal   Left: Facial sensation is normal on the left  CN VII:  Right: There is no facial weakness  Left: There is no facial weakness  CN VIII:  Right: Hearing is normal   Left: Hearing is normal   CN IX, X: Palate elevates symmetrically  CN XI: Shoulder shrug strength is normal   CN XII: Tongue midline without atrophy or fasciculations  Motor   Strength is 5/5 throughout all four extremities      Sensory  Light touch is normal in upper and lower extremities  Reflexes  Glabellar tap absent  Coordination  Right: Finger-to-nose normal   Left: Finger-to-nose normal     Gait  Casual gait is normal including stance, stride, and arm swing  Able to rise from chair without using arms  ROS:    Review of Systems   Constitutional: Negative  Negative for appetite change and fever  HENT: Positive for hearing loss  Negative for tinnitus, trouble swallowing and voice change  Eyes: Negative  Negative for photophobia and pain  Respiratory: Negative  Negative for shortness of breath  Cardiovascular: Negative  Negative for palpitations  Gastrointestinal: Negative  Negative for nausea and vomiting  Endocrine: Negative  Negative for cold intolerance  Genitourinary: Negative  Negative for dysuria, frequency and urgency  Musculoskeletal: Negative for myalgias and neck pain  Skin: Negative  Negative for rash  Allergic/Immunologic: Negative  Neurological: Negative  Negative for dizziness, tremors, seizures, syncope, facial asymmetry, speech difficulty, weakness, light-headedness, numbness and headaches  Hematological: Negative  Does not bruise/bleed easily  Psychiatric/Behavioral: Positive for confusion (At times)  Negative for hallucinations and sleep disturbance  Short term memory     All other systems reviewed and are negative

## 2021-04-15 NOTE — PATIENT INSTRUCTIONS
Patient with mild dementia  He was started on Aricept at the last visit and has been doing well  He scored a 17/30 on MoCA which was the same as his last visit  He is still able to perform his ADLs  He is still driving and we discussed that if he were to continue to progress then will need to have a FTD evaluation  For now will remain on Aricept 10mg daily  Patient was encouraged to increase mind stimulating activities such as reading, crosswords, word searches, puzzles, Soduku, solitaire, coloring and other brain games  We also discussed the importance of staying physically active and eating a health diet such as the Mediterranean or MIND diet

## 2021-05-12 DIAGNOSIS — F41.9 ANXIETY: ICD-10-CM

## 2021-05-12 RX ORDER — ALPRAZOLAM 0.5 MG/1
0.5 TABLET ORAL
Qty: 90 TABLET | Refills: 0 | Status: SHIPPED | OUTPATIENT
Start: 2021-05-12 | End: 2021-09-15 | Stop reason: SDUPTHER

## 2021-05-27 LAB — HBA1C MFR BLD HPLC: 5.6 %

## 2021-06-10 ENCOUNTER — OFFICE VISIT (OUTPATIENT)
Dept: FAMILY MEDICINE CLINIC | Facility: CLINIC | Age: 76
End: 2021-06-10
Payer: MEDICARE

## 2021-06-10 VITALS
TEMPERATURE: 97.3 F | HEIGHT: 68 IN | DIASTOLIC BLOOD PRESSURE: 75 MMHG | HEART RATE: 72 BPM | OXYGEN SATURATION: 99 % | SYSTOLIC BLOOD PRESSURE: 155 MMHG | RESPIRATION RATE: 20 BRPM | WEIGHT: 162.4 LBS | BODY MASS INDEX: 24.61 KG/M2

## 2021-06-10 DIAGNOSIS — I10 ESSENTIAL HYPERTENSION: Primary | ICD-10-CM

## 2021-06-10 DIAGNOSIS — F03.90 MILD DEMENTIA (HCC): ICD-10-CM

## 2021-06-10 DIAGNOSIS — E03.9 HYPOTHYROIDISM, UNSPECIFIED TYPE: ICD-10-CM

## 2021-06-10 DIAGNOSIS — Z00.00 MEDICARE ANNUAL WELLNESS VISIT, SUBSEQUENT: ICD-10-CM

## 2021-06-10 DIAGNOSIS — E78.5 HYPERLIPIDEMIA, UNSPECIFIED HYPERLIPIDEMIA TYPE: ICD-10-CM

## 2021-06-10 DIAGNOSIS — R51.9 CHRONIC INTRACTABLE HEADACHE, UNSPECIFIED HEADACHE TYPE: ICD-10-CM

## 2021-06-10 DIAGNOSIS — G89.29 CHRONIC INTRACTABLE HEADACHE, UNSPECIFIED HEADACHE TYPE: ICD-10-CM

## 2021-06-10 DIAGNOSIS — R73.01 IMPAIRED FASTING GLUCOSE: ICD-10-CM

## 2021-06-10 PROCEDURE — 99214 OFFICE O/P EST MOD 30 MIN: CPT | Performed by: FAMILY MEDICINE

## 2021-06-10 PROCEDURE — G0439 PPPS, SUBSEQ VISIT: HCPCS | Performed by: FAMILY MEDICINE

## 2021-06-10 RX ORDER — LEVOTHYROXINE SODIUM 0.07 MG/1
75 TABLET ORAL DAILY
Qty: 90 TABLET | Refills: 3 | Status: SHIPPED | OUTPATIENT
Start: 2021-06-10 | End: 2022-07-25 | Stop reason: SDUPTHER

## 2021-06-10 RX ORDER — LISINOPRIL AND HYDROCHLOROTHIAZIDE 20; 12.5 MG/1; MG/1
1 TABLET ORAL DAILY
Qty: 90 TABLET | Refills: 1 | Status: SHIPPED | OUTPATIENT
Start: 2021-06-10 | End: 2021-09-09

## 2021-06-10 RX ORDER — BUTALBITAL, ACETAMINOPHEN AND CAFFEINE 50; 325; 40 MG/1; MG/1; MG/1
1 TABLET ORAL DAILY PRN
Qty: 90 TABLET | Refills: 1 | Status: SHIPPED | OUTPATIENT
Start: 2021-06-10 | End: 2022-03-28 | Stop reason: SDUPTHER

## 2021-06-10 NOTE — PROGRESS NOTES
Assessment/Plan:    Essential hypertension  BP elevated in office today  Pt denies symptoms like headache, vision change, SOB or chest pain  DASH diet  Change to lisinopril-HCTZ 20-12 5mg QD  SE educated pt  Check BP at home 2/day and call office if BP always >140/90  Hypothyroidism  5/2021 TSH normal  Continue levothyroxine 75mcg daily  Impaired fasting glucose  5/2021 hgA1C 5 6 normal  Low carb diet  Chronic intractable headache  Give fioricet refill today  SE educated pt  Hyperlipidemia  5/2021 Lipid 202/160/51/119 stable  Low fat diet  Continue niacin  Not on statins  Mild dementia ([facility] [address] )  FU neurology  Continue aricept  Reviewed lab in 5/2021  CMP ok  HgA1C 5 6 normal  Lipid 202/160/51/119 borderline  TSH normal    Flu shot yearly    Got Covid19 vaccine  Denies SE  Pneumovax 2010 when he was 77  Got [id number] in 9/2015    Tdap 2012    Will check insurance for coverage of shingrix    Colonoscopy 3/28/2018, repeat in 5 years  RTO in 3 months to check BP            Diagnoses and all orders for this visit:    Essential hypertension  -     lisinopril-hydrochlorothiazide (PRINZIDE,ZESTORETIC) 20-12 5 MG per tablet; Take 1 tablet by mouth daily  -     Basic metabolic panel; Future    Chronic intractable headache, unspecified headache type  -     butalbital-acetaminophen-caffeine (FIORICET,ESGIC) -40 mg per tablet; Take 1 tablet by mouth daily as needed for headaches    Hypothyroidism, unspecified type  -     levothyroxine 75 mcg tablet; Take 1 tablet (75 mcg total) by mouth daily    Impaired fasting glucose    Hyperlipidemia, unspecified hyperlipidemia type    Mild dementia ([facility] )    Medicare annual wellness visit, subsequent          Subjective:      Patient ID: Warden Sesay is a 64 y o  male  HPI    Pt is here by himself  HTN---BP at home 170/70-80  He is on HCTZ 12 5mg QD  Denies headache, SOB, CP, n/v/abd pain  Pt requests lisinopril because his wife is on lisinopril  Hypothyroidism---He is on levothyroxine 75mcg daily  Feels fine       IFG---follows low carb diet       Hyperlipidemia---follows low fat diet  Not on statins  He is on OTC niacin daily       Tension headache---use fioricet as needed, maybe 3-4 times per week  FU neurology for headache yearly    Mild dementia---FU neurology  He is on Aricept 10mg qhs  Denies side effects       OCD/Anxiety---Stable per pt  He is off prozac 80mg QD per neurology  FU psychiatrist before, does not follow any more  He is on xanax 0 5mg 1-3 times per day as needed        FU urology   [facility] for BPH  He is on [place] which helped  FU opthalmology yearly       No smoking  1 glass of wine at night       Live with wife and son  Does all ADL's  Still drive  Denies recent falls    Denies depression  The following portions of the patient's history were reviewed and updated as appropriate: allergies, current medications, past family history, past medical history, past social history, past surgical history and problem list     Review of Systems   Constitutional: Negative for appetite change, chills and fever  HENT: Negative for congestion, ear pain, sinus pain and sore throat  Eyes: Negative for discharge and itching  Respiratory: Negative for apnea, cough, chest tightness, shortness of breath and wheezing  Cardiovascular: Negative for chest pain, palpitations and leg swelling  Gastrointestinal: Negative for abdominal pain, anal bleeding, constipation, diarrhea, nausea and vomiting  Endocrine: Negative for cold intolerance, heat intolerance and polyuria  Genitourinary: Negative for difficulty urinating and dysuria  Musculoskeletal: Negative for arthralgias, back pain and myalgias  Skin: Negative for rash  Neurological: Negative for dizziness and headaches  Psychiatric/Behavioral: Negative for agitation           Objective:      /75 (BP Location: Left arm, Patient Position: Sitting, Cuff Size: Adult) Pulse 72   Temp (!) 97 3 °F (36 3 °C) (Temporal)   Resp 20   Ht 5' 8" (1 727 m)   Wt 73 7 kg (162 lb 6 4 oz)   SpO2 99%   BMI 24 69 kg/m²          Physical Exam  Constitutional:       Appearance: He is well-developed  HENT:      Head: Normocephalic and atraumatic  Eyes:      General:         Right eye: No discharge  Left eye: No discharge  Conjunctiva/sclera: Conjunctivae normal    Neck:      Musculoskeletal: Normal range of motion and neck supple  No muscular tenderness  Cardiovascular:      Rate and Rhythm: Normal rate and regular rhythm  Heart sounds: Normal heart sounds  No murmur  No friction rub  No gallop  Pulmonary:      Effort: Pulmonary effort is normal  No respiratory distress  Breath sounds: Normal breath sounds  No wheezing or rales  Abdominal:      General: Bowel sounds are normal  There is no distension  Palpations: Abdomen is soft  Tenderness: There is no abdominal tenderness  There is no guarding  Musculoskeletal: Normal range of motion  Right lower leg: No edema  Left lower leg: No edema  Lymphadenopathy:      Cervical: No cervical adenopathy  Neurological:      Mental Status: He is alert

## 2021-06-10 NOTE — PATIENT INSTRUCTIONS
Medicare Preventive Visit Patient Instructions  Thank you for completing your Welcome to Medicare Visit or Medicare Annual Wellness Visit today  Your next wellness visit will be due in one year (6/11/2022)  The screening/preventive services that you may require over the next 5-10 years are detailed below  Some tests may not apply to you based off risk factors and/or age  Screening tests ordered at today's visit but not completed yet may show as past due  Also, please note that scanned in results may not display below  Preventive Screenings:  Service Recommendations Previous Testing/Comments   Colorectal Cancer Screening  · Colonoscopy    · Fecal Occult Blood Test (FOBT)/Fecal Immunochemical Test (FIT)  · Fecal DNA/Cologuard Test  · Flexible Sigmoidoscopy Age: 54-65 years old   Colonoscopy: every 10 years (May be performed more frequently if at higher risk)  OR  FOBT/FIT: every 1 year  OR  Cologuard: every 3 years  OR  Sigmoidoscopy: every 5 years  Screening may be recommended earlier than age 48 if at higher risk for colorectal cancer  Also, an individualized decision between you and your healthcare provider will decide whether screening between the ages of 74-80 would be appropriate  Colonoscopy: 03/20/2013  FOBT/FIT: Not on file  Cologuard: Not on file  Sigmoidoscopy: Not on file          Prostate Cancer Screening Individualized decision between patient and health care provider in men between ages of 53-78   Medicare will cover every 12 months beginning on the day after your 50th birthday PSA: No results in last 5 years           Hepatitis C Screening Once for adults born between [facility]  More frequently in patients at high risk for Hepatitis C Hep C Antibody: Not on file        Diabetes Screening 1-2 times per year if you're at risk for diabetes or have pre-diabetes Fasting glucose: No results in last 5 years   A1C: 5 6        Cholesterol Screening Once every 5 years if you don't have a lipid disorder  May order more often based on risk factors  Lipid panel: 11/07/2019           Other Preventive Screenings Covered by Medicare:  1  Abdominal Aortic Aneurysm (AAA) Screening: covered once if your at risk  You're considered to be at risk if you have a family history of AAA or a male between the age of 73-68 who smoking at least 100 cigarettes in your lifetime  2  Lung Cancer Screening: covers low dose CT scan once per year if you meet all of the following conditions: (1) Age 50-69; (2) No signs or symptoms of lung cancer; (3) Current smoker or have quit smoking within the last 15 years; (4) You have a tobacco smoking history of at least 30 pack years (packs per day x number of years you smoked); (5) You get a written order from a healthcare provider  3  Glaucoma Screening: covered annually if you're considered high risk: (1) You have diabetes OR (2) Family history of glaucoma OR (3)  aged 48 and older OR (3)  American aged 72 and older  3  Osteoporosis Screening: covered every 2 years if you meet one of the following conditions: (1) Have a vertebral abnormality; (2) On glucocorticoid therapy for more than 3 months; (3) Have primary hyperparathyroidism; (4) On osteoporosis medications and need to assess response to drug therapy  5  HIV Screening: covered annually if you're between the age of 12-76  Also covered annually if you are younger than 13 and older than 72 with risk factors for HIV infection  For pregnant patients, it is covered up to 3 times per pregnancy      Immunizations:  Immunization Recommendations   Influenza Vaccine Annual influenza vaccination during flu season is recommended for all persons aged >= 6 months who do not have contraindications   Pneumococcal Vaccine (Prevnar and Pneumovax)  * Prevnar = PCV13  * Pneumovax = PPSV23 Adults 25-60 years old: 1-3 doses may be recommended based on certain risk factors  Adults 72 years old: Prevnar (PCV13) vaccine recommended followed by Pneumovax (PPSV23) vaccine  If already received PPSV23 since turning 65, then PCV13 recommended at least one year after PPSV23 dose  Hepatitis B Vaccine 3 dose series if at intermediate or high risk (ex: diabetes, end stage renal disease, liver disease)   Tetanus (Td) Vaccine - COST NOT COVERED BY MEDICARE PART B Following completion of primary series, a booster dose should be given every 10 years to maintain immunity against tetanus  Td may also be given as tetanus wound prophylaxis  Tdap Vaccine - COST NOT COVERED BY MEDICARE PART B Recommended at least once for all adults  For pregnant patients, recommended with each pregnancy  Shingles Vaccine (Shingrix) - COST NOT COVERED BY MEDICARE PART B  2 shot series recommended in those aged 48 and above     Health Maintenance Due:  There are no preventive care reminders to display for this patient  Immunizations Due:  There are no preventive care reminders to display for this patient  Advance Directives   What are advance directives? Advance directives are legal documents that state your wishes and plans for medical care  These plans are made ahead of time in case you lose your ability to make decisions for yourself  Advance directives can apply to any medical decision, such as the treatments you want, and if you want to donate organs  What are the types of advance directives? There are many types of advance directives, and each state has rules about how to use them  You may choose a combination of any of the following:  · Living will: This is a written record of the treatment you want  You can also choose which treatments you do not want, which to limit, and which to stop at a certain time  This includes surgery, medicine, IV fluid, and tube feedings  · Durable power of  for healthcare [facility]): This is a written record that states who you want to make healthcare choices for you when you are unable to make them for yourself   This person, called a proxy, is usually a family member or a friend  You may choose more than 1 proxy  · Do not resuscitate (DNR) order:  A DNR order is used in case your heart stops beating or you stop breathing  It is a request not to have certain forms of treatment, such as CPR  A DNR order may be included in other types of advance directives  · Medical directive: This covers the care that you want if you are in a coma, near death, or unable to make decisions for yourself  You can list the treatments you want for each condition  Treatment may include pain medicine, surgery, blood transfusions, dialysis, IV or tube feedings, and a ventilator (breathing machine)  · Values history: This document has questions about your views, beliefs, and how you feel and think about life  This information can help others choose the care that you would choose  Why are advance directives important? An advance directive helps you control your care  Although spoken wishes may be used, it is better to have your wishes written down  Spoken wishes can be misunderstood, or not followed  Treatments may be given even if you do not want them  An advance directive may make it easier for your family to make difficult choices about your care  © Copyright [employer] 2018 Information is for End User's use only and may not be sold, redistributed or otherwise used for commercial purposes   All illustrations and images included in CareNotes® are the copyrighted property of A D A M , Inc  or [address] [place] [address] [employer]

## 2021-06-10 NOTE — PROGRESS NOTES
Chief Complaint   Patient presents with   [facility] Wellness Visit     Subsequent   Follow-up     6 months and review lab  Health Maintenance   Topic Date Due    Medicare Annual Wellness Visit (AWV)  06/04/2021    Depression Screening PHQ  12/03/2021    Fall Risk  12/03/2021    BMI: Adult  06/10/2022    DTaP,Tdap,and Td Vaccines (2 - Td) 12/01/2022    Pneumococcal Vaccine: 65+ Years  Completed    Influenza Vaccine  Completed    COVID-19 Vaccine  Completed    HIB Vaccine  Aged Out    Hepatitis B Vaccine  Aged Out    IPV Vaccine  Aged Out    Hepatitis A Vaccine  Aged Out    Meningococcal ACWY Vaccine  Aged Out    HPV Vaccine  Aged Out        Assessment and Plan:     Problem List Items Addressed This Visit        Endocrine    Hypothyroidism     5/2021 TSH normal  Continue levothyroxine 75mcg daily  Relevant Medications    levothyroxine 75 mcg tablet    Impaired fasting glucose     5/2021 hgA1C 5 6 normal  Low carb diet  Cardiovascular and Mediastinum    Essential hypertension - Primary     BP elevated in office today  Pt denies symptoms like headache, vision change, SOB or chest pain  DASH diet  Change to lisinopril-HCTZ 20-12 5mg QD  SE educated pt  Check BP at home 2/day and call office if BP always >140/90  Relevant Medications    lisinopril-hydrochlorothiazide (PRINZIDE,ZESTORETIC) 20-12 5 MG per tablet    Other Relevant Orders    Basic metabolic panel       Nervous and Auditory    Mild dementia ([address] )     FU neurology  Continue aricept  Other    Hyperlipidemia     5/2021 Lipid 202/160/51/119 stable  Low fat diet  Continue niacin  Not on statins  Chronic intractable headache     Give fioricet refill today  SE educated pt            Relevant Medications    butalbital-acetaminophen-caffeine (FIORICET,ESGIC) -40 mg per tablet      Other Visit Diagnoses     Medicare annual wellness visit, subsequent               Preventive health issues were discussed with patient, and age appropriate screening tests were ordered as noted in patient's After Visit Summary  Personalized health advice and appropriate referrals for health education or preventive services given if needed, as noted in patient's After Visit Summary  History of Present Illness:     Patient presents for Medicare Annual Wellness visit    Patient Care Team:  Blaire Curry MD as PCP - Christiano Esquivel MD as Endoscopist  Mike Vallecillo MD (Urology)     Problem List:     Patient Active Problem List   Diagnosis    Marital dysfunction    Anxiety    Benign prostatic hyperplasia    Cataract    Erectile dysfunction of non-organic origin    Hearing loss    Hyperlipidemia    Hypothyroidism    Impaired fasting glucose    Macrocytosis    Memory loss    Osteoarthritis    Primary localized osteoarthrosis of the hip    Tension type headache    PROVISIONAL Obsessive compulsive personality disorder    BMI 25 0-25 9,adult    Chronic intractable headache    Mild dementia ([address] )    Essential hypertension      Past Medical and Surgical History:     Past Medical History:   Diagnosis Date    Abnormal weight loss     Anxiety     Bursitis of left hip     Cervical radiculopathy     Colon polyps     Depression     Nicotine dependence in remission     Non-toxic multinodular goiter      Past Surgical History:   Procedure Laterality Date   Scanlon Fallon Left     Dr Gretel Xavier; onset: 8/6/13    COLONOSCOPY      3/20/13, normal clon - Dr Joel Angelo, repeat in 5-10 years    HIP SURGERY      VA COLONOSCOPY FLX DX W/COLLJ SPEC WHEN PFRMD N/A 3/28/2018    Procedure: COLONOSCOPY;  Surgeon: Andres Thomas MD;  Location: [facility];   Service: Colorectal    THYROIDECTOMY, PARTIAL      TONSILLECTOMY      TOTAL HIP ARTHROPLASTY        Family History:     Family History   Problem Relation Age of Onset    Diabetes Father     Lung cancer Father     Alcohol abuse Paternal Uncle  Heart disease Paternal Uncle       Social History:        Social History     Socioeconomic History    Marital status: /Civil Union     Spouse name: None    Number of children: None    Years of education: completed some college    Highest education level: None   Occupational History    None   Social Needs    Financial resource strain: None    Food insecurity     Worry: None     Inability: None    Transportation needs     Medical: None     Non-medical: None   Tobacco Use    Smoking status: Former Smoker    Smokeless tobacco: Never Used   Substance and Sexual Activity    Alcohol use: No     Comment: social use as per [employer]    Drug use: No    Sexual activity: None   Lifestyle    Physical activity     Days per week: None     Minutes per session: None    Stress: None   Relationships    Social connections     Talks on phone: None     Gets together: None     Attends Caodaism service: None     Active member of club or organization: None     Attends meetings of clubs or organizations: None     Relationship status: None    Intimate partner violence     Fear of current or ex partner: None     Emotionally abused: None     Physically abused: None     Forced sexual activity: None   Other Topics Concern    None   Social History Narrative    Caffeine use    Marital relationship problem      Medications and Allergies:     Current Outpatient Medications   Medication Sig Dispense Refill    Acetaminophen (TYLENOL PO) Take by mouth as needed      ALPRAZolam (XANAX) 0 5 mg tablet Take 1 tablet (0 5 mg total) by mouth daily at bedtime as needed for anxiety 90 tablet 0    butalbital-acetaminophen-caffeine (FIORICET,ESGIC) -40 mg per tablet Take 1 tablet by mouth daily as needed for headaches 90 tablet 1    donepezil (ARICEPT) 10 mg tablet Take 1 tablet (10 mg total) by mouth daily at bedtime 90 tablet 3    Dutasteride-Tamsulosin HCl (DMITRI) 0 5-0 4 MG CAPS Take 1 capsule by mouth daily      levothyroxine 75 mcg tablet Take 1 tablet (75 mcg total) by mouth daily 90 tablet 3    lisinopril-hydrochlorothiazide (PRINZIDE,ZESTORETIC) 20-12 5 MG per tablet Take 1 tablet by mouth daily 90 tablet 1    niacin 500 mg tablet Take 500 mg by mouth daily with breakfast       No current facility-administered medications for this visit  Allergies   Allergen Reactions    Aspirin      Other reaction(s): Blood Disorders, rectal bleeding/friable prostate  Category: Adverse Reaction;       Immunizations:     Immunization History   Administered Date(s) Administered    H1N1, All Formulations 01/03/2010    INFLUENZA 09/28/2018, 09/16/2020    Influenza Split High Dose Preservative Free IM 09/16/2014, 09/29/2015, 09/29/2016    Influenza, seasonal, injectable 10/01/2012    Pneumococcal Conjugate 13-Valent 09/29/2015    Pneumococcal Polysaccharide PPV23 11/15/2010    SARS-CoV-2 / COVID-19 mRNA IM (Pfizer-BioNTech) 01/25/2021, 02/15/2021    Tdap 12/01/2012    Varicella 01/01/2013    Zoster 01/01/2013      Health Maintenance: There are no preventive care reminders to display for this patient  There are no preventive care reminders to display for this patient  Medicare Health Risk Assessment:     /75 (BP Location: Left arm, Patient Position: Sitting, Cuff Size: Adult)   Pulse 72   Temp (!) 97 3 °F (36 3 °C) (Temporal)   Resp 20   Ht 5' 8" (1 727 m)   Wt 73 7 kg (162 lb 6 4 oz)   SpO2 99%   BMI 24 69 kg/m²      Reche Tomball is here for his Subsequent Wellness visit  Health Risk Assessment:   Patient rates overall health as good  Patient feels that their physical health rating is same  Patient is satisfied with their life  Eyesight was rated as same  Hearing was rated as slightly worse  Patient feels that their emotional and mental health rating is same  Patients states they are sometimes angry  Patient states they are never, rarely unusually tired/fatigued   Pain experienced in the last 7 days has been none  Patient states that he has experienced no weight loss or gain in last 6 months  Depression Screening:   PHQ-2 Score: 0      Fall Risk Screening: In the past year, patient has experienced: no history of falling in past year      Home Safety:  Patient does not have trouble with stairs inside or outside of their home  Patient has working smoke alarms and has working carbon monoxide detector  Home safety hazards include: none  Nutrition:   Current diet is Regular  Medications:   Patient is currently taking over-the-counter supplements  OTC medications include: see medication list  Patient is able to manage medications  Activities of Daily Living (ADLs)/Instrumental Activities of Daily Living (IADLs):   Walk and transfer into and out of bed and chair?: Yes  Dress and groom yourself?: Yes    Bathe or shower yourself?: Yes    Feed yourself? Yes  Do your laundry/housekeeping?: Yes  Manage your money, pay your bills and track your expenses?: Yes  Make your own meals?: Yes    Do your own shopping?: Yes    Previous Hospitalizations:   Any hospitalizations or ED visits within the last 12 months?: No      Cognitive Screening:   Provider or family/friend/caregiver concerned regarding cognition?: Yes    Cognition Comments: FU neurology       PREVENTIVE SCREENINGS      Cardiovascular Screening:    General: Screening Not Indicated, History Lipid Disorder and Screening Current      Diabetes Screening:     General: Screening Current      Colorectal Cancer Screening:     General: Screening Not Indicated      Prostate Cancer Screening:    General: Screening Not Indicated      Abdominal Aortic Aneurysm (AAA) Screening:    Risk factors include: tobacco use        Lung Cancer Screening:     General: Screening Not Indicated    Screening, Brief Intervention, and Referral to Treatment (SBIRT)    Screening  Typical number of drinks in a day: 1  Typical number of drinks in a week: 7  Interpretation: Low risk drinking behavior      Single Item Drug Screening:  How often have you used an illegal drug (including marijuana) or a prescription medication for non-medical reasons in the past year? never    Single Item Drug Screen Score: 0  Interpretation: Negative screen for possible drug use disorder      Richy Peter MD

## 2021-06-10 NOTE — ASSESSMENT & PLAN NOTE
BP elevated in office today  Pt denies symptoms like headache, vision change, SOB or chest pain  DASH diet  Change to lisinopril-HCTZ 20-12 5mg QD  SE educated pt  Check BP at home 2/day and call office if BP always >140/90

## 2021-09-09 ENCOUNTER — OFFICE VISIT (OUTPATIENT)
Dept: FAMILY MEDICINE CLINIC | Facility: CLINIC | Age: 76
End: 2021-09-09
Payer: MEDICARE

## 2021-09-09 VITALS
SYSTOLIC BLOOD PRESSURE: 160 MMHG | RESPIRATION RATE: 16 BRPM | OXYGEN SATURATION: 99 % | TEMPERATURE: 97.4 F | BODY MASS INDEX: 25.4 KG/M2 | HEART RATE: 68 BPM | WEIGHT: 167.6 LBS | HEIGHT: 68 IN | DIASTOLIC BLOOD PRESSURE: 72 MMHG

## 2021-09-09 DIAGNOSIS — I10 ESSENTIAL HYPERTENSION: Primary | ICD-10-CM

## 2021-09-09 PROCEDURE — 99213 OFFICE O/P EST LOW 20 MIN: CPT | Performed by: FAMILY MEDICINE

## 2021-09-09 RX ORDER — LISINOPRIL AND HYDROCHLOROTHIAZIDE 25; 20 MG/1; MG/1
1 TABLET ORAL DAILY
Qty: 90 TABLET | Refills: 1 | Status: SHIPPED | OUTPATIENT
Start: 2021-09-09 | End: 2022-03-28 | Stop reason: SDUPTHER

## 2021-09-09 NOTE — ASSESSMENT & PLAN NOTE
BP elevated in office today  Pt denies symptoms like headache, vision change, SOB or chest pain  DASH diet  Increase to lisinopril-HCTZ 20-25mg QD  Check BP at home 2/day and call office if BP always >140/90

## 2021-09-09 NOTE — PROGRESS NOTES
Chief Complaint   Patient presents with    Follow-up     3 months and review labs  Health Maintenance   Topic Date Due    Hepatitis C Screening  Never done    Influenza Vaccine (1) 09/01/2021    Fall Risk  06/10/2022    Depression Screening PHQ  06/10/2022    Medicare Annual Wellness Visit (AWV)  06/10/2022    BMI: Adult  06/10/2022    DTaP,Tdap,and Td Vaccines (2 - Td or Tdap) 12/01/2022    Colorectal Cancer Screening  03/28/2023    Pneumococcal Vaccine: 65+ Years  Completed    COVID-19 Vaccine  Completed    HIB Vaccine  Aged Out    Hepatitis B Vaccine  Aged Out    IPV Vaccine  Aged Out    Hepatitis A Vaccine  Aged Out    Meningococcal ACWY Vaccine  Aged Out    HPV Vaccine  Aged Out           Assessment/Plan:    Essential hypertension  BP elevated in office today  Pt denies symptoms like headache, vision change, SOB or chest pain  DASH diet  Increase to lisinopril-HCTZ 20-25mg QD  Check BP at home 2/day and call office if BP always >140/90  Reviewed lab in 9/2021  BMP good    RTO in 1 month  Diagnoses and all orders for this visit:    Essential hypertension  -     lisinopril-hydrochlorothiazide (PRINZIDE,ZESTORETIC) 20-25 MG per tablet; Take 1 tablet by mouth daily    Other orders  -     Cancel: Hepatitis C Antibody ([facility]); Future          Subjective:      Patient ID: Anika Michelle is a 64 y o  male  HPI  Pt is here by himself  HTN---BP at home still elevated  Top number >150  Does not remember the lower number  He did not bring his BP machine or BP log  He is on lisinopril-HCTZ 20-12 5mg QD  Denies headache, SOB, CP, n/v/abd pain           The following portions of the patient's history were reviewed and updated as appropriate: allergies, current medications, past family history, past medical history, past social history, past surgical history and problem list     Review of Systems   Constitutional: Negative for appetite change, chills and fever     HENT: Negative for congestion, ear pain, sinus pain and sore throat  Eyes: Negative for discharge and itching  Respiratory: Negative for apnea, cough, chest tightness, shortness of breath and wheezing  Cardiovascular: Negative for chest pain, palpitations and leg swelling  Gastrointestinal: Negative for abdominal pain, anal bleeding, constipation, diarrhea, nausea and vomiting  Endocrine: Negative for cold intolerance, heat intolerance and polyuria  Genitourinary: Negative for difficulty urinating and dysuria  Musculoskeletal: Negative for arthralgias, back pain and myalgias  Skin: Negative for rash  Neurological: Negative for dizziness and headaches  Psychiatric/Behavioral: Negative for agitation  Objective:      /72 (BP Location: Left arm, Patient Position: Sitting, Cuff Size: Adult)   Pulse 68   Temp (!) 97 4 °F (36 3 °C) (Tympanic)   Resp 16   Ht 5' 8" (1 727 m)   Wt 76 kg (167 lb 9 6 oz)   SpO2 99%   BMI 25 48 kg/m²          Physical Exam  Constitutional:       Appearance: He is well-developed  HENT:      Head: Normocephalic and atraumatic  Eyes:      General:         Right eye: No discharge  Left eye: No discharge  Conjunctiva/sclera: Conjunctivae normal    Cardiovascular:      Rate and Rhythm: Normal rate and regular rhythm  Heart sounds: Normal heart sounds  No murmur heard  No friction rub  No gallop  Pulmonary:      Effort: Pulmonary effort is normal  No respiratory distress  Breath sounds: Normal breath sounds  No wheezing or rales  Abdominal:      General: Bowel sounds are normal  There is no distension  Palpations: Abdomen is soft  Tenderness: There is no abdominal tenderness  There is no guarding  Musculoskeletal:         General: Normal range of motion  Cervical back: Normal range of motion and neck supple  No tenderness  Right lower leg: No edema  Left lower leg: No edema     Lymphadenopathy:      Cervical: No cervical adenopathy  Neurological:      Mental Status: He is alert

## 2021-09-15 DIAGNOSIS — F41.9 ANXIETY: ICD-10-CM

## 2021-09-15 RX ORDER — ALPRAZOLAM 0.5 MG/1
0.5 TABLET ORAL
Qty: 90 TABLET | Refills: 0 | Status: SHIPPED | OUTPATIENT
Start: 2021-09-15 | End: 2021-12-14 | Stop reason: SDUPTHER

## 2021-09-27 ENCOUNTER — TELEPHONE (OUTPATIENT)
Dept: FAMILY MEDICINE CLINIC | Facility: CLINIC | Age: 76
End: 2021-09-27

## 2021-10-02 ENCOUNTER — IMMUNIZATIONS (OUTPATIENT)
Dept: FAMILY MEDICINE CLINIC | Facility: HOSPITAL | Age: 76
End: 2021-10-02

## 2021-10-02 DIAGNOSIS — Z23 ENCOUNTER FOR IMMUNIZATION: Primary | ICD-10-CM

## 2021-10-02 PROCEDURE — 0001A SARS-COV-2 / COVID-19 MRNA VACCINE (PFIZER-BIONTECH) 30 MCG: CPT

## 2021-10-02 PROCEDURE — 91300 SARS-COV-2 / COVID-19 MRNA VACCINE (PFIZER-BIONTECH) 30 MCG: CPT

## 2021-10-08 ENCOUNTER — OFFICE VISIT (OUTPATIENT)
Dept: FAMILY MEDICINE CLINIC | Facility: CLINIC | Age: 76
End: 2021-10-08
Payer: MEDICARE

## 2021-10-08 VITALS
RESPIRATION RATE: 20 BRPM | WEIGHT: 168.2 LBS | TEMPERATURE: 97.6 F | HEIGHT: 68 IN | DIASTOLIC BLOOD PRESSURE: 60 MMHG | SYSTOLIC BLOOD PRESSURE: 120 MMHG | BODY MASS INDEX: 25.49 KG/M2 | HEART RATE: 88 BPM | OXYGEN SATURATION: 99 %

## 2021-10-08 DIAGNOSIS — E78.5 HYPERLIPIDEMIA, UNSPECIFIED HYPERLIPIDEMIA TYPE: ICD-10-CM

## 2021-10-08 DIAGNOSIS — I10 ESSENTIAL HYPERTENSION: Primary | ICD-10-CM

## 2021-10-08 DIAGNOSIS — E03.9 HYPOTHYROIDISM, UNSPECIFIED TYPE: ICD-10-CM

## 2021-10-08 DIAGNOSIS — F41.9 ANXIETY: ICD-10-CM

## 2021-10-08 DIAGNOSIS — R73.01 IMPAIRED FASTING GLUCOSE: ICD-10-CM

## 2021-10-08 PROCEDURE — 1124F ACP DISCUSS-NO DSCNMKR DOCD: CPT | Performed by: FAMILY MEDICINE

## 2021-10-08 PROCEDURE — 99213 OFFICE O/P EST LOW 20 MIN: CPT | Performed by: FAMILY MEDICINE

## 2021-10-08 RX ORDER — FLUOXETINE HYDROCHLORIDE 40 MG/1
40 CAPSULE ORAL DAILY
Qty: 90 CAPSULE | Refills: 3 | Status: SHIPPED | OUTPATIENT
Start: 2021-10-08 | End: 2022-01-11

## 2021-11-18 ENCOUNTER — OFFICE VISIT (OUTPATIENT)
Dept: NEUROLOGY | Facility: CLINIC | Age: 76
End: 2021-11-18
Payer: MEDICARE

## 2021-11-18 VITALS
HEART RATE: 66 BPM | BODY MASS INDEX: 25.03 KG/M2 | SYSTOLIC BLOOD PRESSURE: 132 MMHG | DIASTOLIC BLOOD PRESSURE: 68 MMHG | WEIGHT: 164.6 LBS

## 2021-11-18 DIAGNOSIS — F03.90 MILD DEMENTIA (HCC): ICD-10-CM

## 2021-11-18 PROCEDURE — 99214 OFFICE O/P EST MOD 30 MIN: CPT | Performed by: PHYSICIAN ASSISTANT

## 2021-11-18 RX ORDER — DONEPEZIL HYDROCHLORIDE 10 MG/1
10 TABLET, FILM COATED ORAL
Qty: 90 TABLET | Refills: 3 | Status: SHIPPED | OUTPATIENT
Start: 2021-11-18

## 2021-12-14 DIAGNOSIS — F41.9 ANXIETY: ICD-10-CM

## 2021-12-15 RX ORDER — ALPRAZOLAM 0.5 MG/1
0.5 TABLET ORAL
Qty: 90 TABLET | Refills: 0 | Status: SHIPPED | OUTPATIENT
Start: 2021-12-15 | End: 2022-03-29 | Stop reason: SDUPTHER

## 2022-01-11 ENCOUNTER — OFFICE VISIT (OUTPATIENT)
Dept: FAMILY MEDICINE CLINIC | Facility: CLINIC | Age: 77
End: 2022-01-11
Payer: MEDICARE

## 2022-01-11 VITALS
HEART RATE: 76 BPM | SYSTOLIC BLOOD PRESSURE: 146 MMHG | RESPIRATION RATE: 20 BRPM | TEMPERATURE: 97.9 F | OXYGEN SATURATION: 97 % | HEIGHT: 68 IN | BODY MASS INDEX: 25.46 KG/M2 | WEIGHT: 168 LBS | DIASTOLIC BLOOD PRESSURE: 76 MMHG

## 2022-01-11 DIAGNOSIS — Z72.89 ALCOHOL USE: ICD-10-CM

## 2022-01-11 DIAGNOSIS — F41.9 ANXIETY: ICD-10-CM

## 2022-01-11 DIAGNOSIS — E03.9 HYPOTHYROIDISM, UNSPECIFIED TYPE: ICD-10-CM

## 2022-01-11 DIAGNOSIS — R73.01 IMPAIRED FASTING GLUCOSE: ICD-10-CM

## 2022-01-11 DIAGNOSIS — E78.5 HYPERLIPIDEMIA, UNSPECIFIED HYPERLIPIDEMIA TYPE: ICD-10-CM

## 2022-01-11 DIAGNOSIS — I10 ESSENTIAL HYPERTENSION: Primary | ICD-10-CM

## 2022-01-11 PROBLEM — Z78.9 ALCOHOL USE: Status: ACTIVE | Noted: 2022-01-11

## 2022-01-11 PROBLEM — F10.90 ALCOHOL USE: Status: ACTIVE | Noted: 2022-01-11

## 2022-01-11 PROCEDURE — 99214 OFFICE O/P EST MOD 30 MIN: CPT | Performed by: FAMILY MEDICINE

## 2022-01-11 RX ORDER — AMLODIPINE BESYLATE 5 MG/1
5 TABLET ORAL DAILY
Qty: 90 TABLET | Refills: 3 | Status: SHIPPED | OUTPATIENT
Start: 2022-01-11 | End: 2022-07-12 | Stop reason: SDUPTHER

## 2022-01-11 RX ORDER — FLUOXETINE HYDROCHLORIDE 40 MG/1
80 CAPSULE ORAL DAILY
Qty: 180 CAPSULE | Refills: 3 | Status: SHIPPED | OUTPATIENT
Start: 2022-01-11 | End: 2022-07-18 | Stop reason: SDUPTHER

## 2022-01-11 NOTE — PROGRESS NOTES
Chief Complaint   Patient presents with    Follow-up     3 months and review labs  Health Maintenance   Topic Date Due    Hepatitis C Screening  Never done    BMI: Followup Plan  11/12/2020    Influenza Vaccine (1) 09/01/2021    Fall Risk  06/10/2022    Medicare Annual Wellness Visit (AWV)  06/10/2022    DTaP,Tdap,and Td Vaccines (2 - Td or Tdap) 12/01/2022    Depression Screening  01/11/2023    BMI: Adult  01/11/2023    Colorectal Cancer Screening  03/28/2023    Pneumococcal Vaccine: 65+ Years  Completed    COVID-19 Vaccine  Completed    HIB Vaccine  Aged Out    Hepatitis B Vaccine  Aged Out    IPV Vaccine  Aged Out    Hepatitis A Vaccine  Aged Out    Meningococcal ACWY Vaccine  Aged Out    HPV Vaccine  Aged Out           Assessment/Plan:    Essential hypertension  Uncontrolled at home  DASH diet  Add amlodipine 5mg QD  Continue prinzide 20-25mg daily  Anxiety  Worse recently  Increase prozac to 80mg Qd  Continue xanax 0 5mg qhs prn  Impaired fasting glucose  1/2022 hgA1C 6 0 low carb diet  Hypothyroidism  1/2022 TSH normal  Continue levothyroxine 75mcg daily  Hyperlipidemia  Low fat diet  Alcohol use  Advised pt to avoid alcohol  Cannot use it with xanax  Reviewed lab in 1/2022  CMP ok  HgA1C 6 0 stable  Lipid 202/126/57/120 ok  TSH normal  CBC normal    Flu shot yearly    Got Covid19 vaccines and booster  Denies SE  Pneumovax 2010 when he was 77  Got [id number] in 9/2015    Tdap 2012    Recommend shingrix    Colonoscopy 3/28/2018, repeat in 5 years  RTO in 6 months  Diagnoses and all orders for this visit:    Essential hypertension  -     amLODIPine (NORVASC) 5 mg tablet; Take 1 tablet (5 mg total) by mouth daily    Anxiety  -     FLUoxetine (PROzac) 40 MG capsule; Take 2 capsules (80 mg total) by mouth daily    Impaired fasting glucose  -     Comprehensive metabolic panel; Future  -     Hemoglobin A1C; Future  -     Lipid panel;  Future  -     TSH, 3rd generation with Free T4 reflex; Future    Hypothyroidism, unspecified type  -     Comprehensive metabolic panel; Future  -     Hemoglobin A1C; Future  -     Lipid panel; Future  -     TSH, 3rd generation with Free T4 reflex; Future    Hyperlipidemia, unspecified hyperlipidemia type  -     Comprehensive metabolic panel; Future  -     Hemoglobin A1C; Future  -     Lipid panel; Future  -     TSH, 3rd generation with Free T4 reflex; Future    Alcohol use          Subjective:      Patient ID: Maya Vallejo is a 64 y o  male  HPI    Pt is here with his wife       HTN----BP at night elevated recently  Stress at home  He is on lisinopril-HCTZ 20-25mg QD  Denies SE  Denies headache, SOB, CP       Anxiety/OCD---Pt was off prozac 80mg for a while  Pt restart prozac 40mg QD which helped but not enough  He is on xanax 0 5mg qhs prn  Hypothyroidism---He is on levothyroxine 75mcg daily  Feels fine       IFG---follows low carb diet       Hyperlipidemia---follows low fat diet  Not on statins  He is on OTC niacin daily       Tension headache---use fioricet as needed, maybe 3-4 times per week  FU neurology for headache yearly    Mild dementia---FU neurology  He is on Aricept 10mg qhs  Denies side effects       FU urology Dr [place]air for BPH  He is on [place] which helped  FU opthalmology yearly       No smoking  1 bottle of wine at night       Live with wife and son  Does all ADL's  Still drive  Denies recent falls    Denies depression  The following portions of the patient's history were reviewed and updated as appropriate: allergies, current medications, past family history, past medical history, past social history, past surgical history and problem list     Review of Systems   Constitutional: Negative for appetite change, chills and fever  HENT: Negative for congestion, ear pain, sinus pain and sore throat  Eyes: Negative for discharge and itching     Respiratory: Negative for apnea, cough, chest tightness, shortness of breath and wheezing  Cardiovascular: Negative for chest pain, palpitations and leg swelling  Gastrointestinal: Negative for abdominal pain, anal bleeding, constipation, diarrhea, nausea and vomiting  Endocrine: Negative for cold intolerance, heat intolerance and polyuria  Genitourinary: Negative for difficulty urinating and dysuria  Musculoskeletal: Positive for arthralgias  Negative for back pain and myalgias  Skin: Negative for rash  Neurological: Negative for dizziness and headaches  Psychiatric/Behavioral: Negative for agitation  Objective:      /76 (BP Location: Left arm, Patient Position: Sitting, Cuff Size: Adult)   Pulse 76   Temp 97 9 °F (36 6 °C) (Tympanic)   Resp 20   Ht 5' 8" (1 727 m)   Wt 76 2 kg (168 lb)   SpO2 97%   BMI 25 54 kg/m²          Physical Exam  Constitutional:       Appearance: He is well-developed  HENT:      Head: Normocephalic and atraumatic  Eyes:      General:         Right eye: No discharge  Left eye: No discharge  Conjunctiva/sclera: Conjunctivae normal    Cardiovascular:      Rate and Rhythm: Normal rate and regular rhythm  Heart sounds: Normal heart sounds  No murmur heard  No friction rub  No gallop  Pulmonary:      Effort: Pulmonary effort is normal  No respiratory distress  Breath sounds: Normal breath sounds  No wheezing or rales  Abdominal:      General: Bowel sounds are normal  There is no distension  Palpations: Abdomen is soft  Tenderness: There is no abdominal tenderness  There is no guarding  Musculoskeletal:         General: Normal range of motion  Cervical back: Normal range of motion and neck supple  No tenderness  Right lower leg: No edema  Left lower leg: No edema  Lymphadenopathy:      Cervical: No cervical adenopathy  Neurological:      Mental Status: He is alert

## 2022-03-28 DIAGNOSIS — G89.29 CHRONIC INTRACTABLE HEADACHE, UNSPECIFIED HEADACHE TYPE: ICD-10-CM

## 2022-03-28 DIAGNOSIS — R51.9 CHRONIC INTRACTABLE HEADACHE, UNSPECIFIED HEADACHE TYPE: ICD-10-CM

## 2022-03-28 DIAGNOSIS — I10 ESSENTIAL HYPERTENSION: ICD-10-CM

## 2022-03-29 DIAGNOSIS — F41.9 ANXIETY: ICD-10-CM

## 2022-03-29 RX ORDER — LISINOPRIL AND HYDROCHLOROTHIAZIDE 25; 20 MG/1; MG/1
1 TABLET ORAL DAILY
Qty: 90 TABLET | Refills: 1 | Status: SHIPPED | OUTPATIENT
Start: 2022-03-29

## 2022-03-29 RX ORDER — ALPRAZOLAM 0.5 MG/1
0.5 TABLET ORAL
Qty: 90 TABLET | Refills: 0 | Status: SHIPPED | OUTPATIENT
Start: 2022-03-29

## 2022-03-29 RX ORDER — BUTALBITAL, ACETAMINOPHEN AND CAFFEINE 50; 325; 40 MG/1; MG/1; MG/1
1 TABLET ORAL DAILY PRN
Qty: 90 TABLET | Refills: 0 | Status: SHIPPED | OUTPATIENT
Start: 2022-03-29 | End: 2022-07-25 | Stop reason: SDUPTHER

## 2022-05-12 ENCOUNTER — TELEPHONE (OUTPATIENT)
Dept: NEUROLOGY | Facility: CLINIC | Age: 77
End: 2022-05-12

## 2022-05-12 NOTE — TELEPHONE ENCOUNTER
Called pt and LMOM stating that I am calling in regards to r/s today's appt as the provider will not be in the office  I apologized for the short notice and informed the patient of the appts we have available at this time  I then asked the patient to please give us a call back to get this appt scheduled

## 2022-06-16 ENCOUNTER — RA CDI HCC (OUTPATIENT)
Dept: OTHER | Facility: HOSPITAL | Age: 77
End: 2022-06-16

## 2022-06-16 NOTE — PROGRESS NOTES
[address]  coding opportunities       Chart reviewed, no opportunity found: CHART REVIEWED, NO OPPORTUNITY FOUND        Patients Insurance     Medicare Insurance: Medicare

## 2022-07-11 LAB — HBA1C MFR BLD HPLC: 5.6 %

## 2022-07-12 ENCOUNTER — OFFICE VISIT (OUTPATIENT)
Dept: FAMILY MEDICINE CLINIC | Facility: CLINIC | Age: 77
End: 2022-07-12
Payer: MEDICARE

## 2022-07-12 VITALS
TEMPERATURE: 98 F | RESPIRATION RATE: 20 BRPM | HEIGHT: 68 IN | BODY MASS INDEX: 25.4 KG/M2 | HEART RATE: 72 BPM | DIASTOLIC BLOOD PRESSURE: 72 MMHG | WEIGHT: 167.6 LBS | SYSTOLIC BLOOD PRESSURE: 150 MMHG | OXYGEN SATURATION: 97 %

## 2022-07-12 DIAGNOSIS — G44.209 TENSION-TYPE HEADACHE, NOT INTRACTABLE, UNSPECIFIED CHRONICITY PATTERN: ICD-10-CM

## 2022-07-12 DIAGNOSIS — Z00.00 MEDICARE ANNUAL WELLNESS VISIT, SUBSEQUENT: ICD-10-CM

## 2022-07-12 DIAGNOSIS — R73.01 IMPAIRED FASTING GLUCOSE: ICD-10-CM

## 2022-07-12 DIAGNOSIS — I10 ESSENTIAL HYPERTENSION: Primary | ICD-10-CM

## 2022-07-12 DIAGNOSIS — F41.9 ANXIETY: ICD-10-CM

## 2022-07-12 DIAGNOSIS — R41.3 MEMORY LOSS: ICD-10-CM

## 2022-07-12 DIAGNOSIS — E78.5 HYPERLIPIDEMIA, UNSPECIFIED HYPERLIPIDEMIA TYPE: ICD-10-CM

## 2022-07-12 DIAGNOSIS — E03.9 HYPOTHYROIDISM, UNSPECIFIED TYPE: ICD-10-CM

## 2022-07-12 DIAGNOSIS — N40.0 BENIGN PROSTATIC HYPERPLASIA, UNSPECIFIED WHETHER LOWER URINARY TRACT SYMPTOMS PRESENT: ICD-10-CM

## 2022-07-12 PROBLEM — M16.10 PRIMARY LOCALIZED OSTEOARTHRITIS OF PELVIC REGION AND THIGH: Status: ACTIVE | Noted: 2022-07-12

## 2022-07-12 PROBLEM — D16.20 OSTEOCHONDROMA OF FEMUR: Status: ACTIVE | Noted: 2022-07-12

## 2022-07-12 PROCEDURE — 99214 OFFICE O/P EST MOD 30 MIN: CPT | Performed by: FAMILY MEDICINE

## 2022-07-12 PROCEDURE — G0439 PPPS, SUBSEQ VISIT: HCPCS | Performed by: FAMILY MEDICINE

## 2022-07-12 RX ORDER — AMLODIPINE BESYLATE 10 MG/1
10 TABLET ORAL DAILY
Qty: 90 TABLET | Refills: 1 | Status: SHIPPED | OUTPATIENT
Start: 2022-07-12

## 2022-07-12 NOTE — PROGRESS NOTES
Assessment/Plan:    Essential hypertension  Uncontrolled  DASH diet  Increase amlodipine to 10mg QD  Continue prinzide 20-25mg QD  Impaired fasting glucose  Low carb diet  Hypothyroidism  Continue levothyroxine 75mcg daily  Anxiety  Stable  Continue prozac 40mg QD  Use xanax prn  Tension type headache  Use fioricet prn, maybe 1-2 times/week  Memory loss  Continue aricept per neurology  Pt states he went to [facility] 2 days ago  I did not get lab report yet  Flu shot yearly    Got Covid19 vaccines and boosters  Pneumovax 2010 when he was 77  Got [id number] in 9/2015    Tdap 2012    Recommend shingrix    RTO in 6 months  POA---wife  Living will---DNR      Diagnoses and all orders for this visit:    Essential hypertension  -     amLODIPine (NORVASC) 10 mg tablet; Take 1 tablet (10 mg total) by mouth daily  -     CBC; Future  -     Comprehensive metabolic panel; Future  -     Hemoglobin A1C; Future  -     Lipid panel; Future  -     TSH, 3rd generation with Free T4 reflex; Future    Hypothyroidism, unspecified type  -     CBC; Future  -     Comprehensive metabolic panel; Future  -     Hemoglobin A1C; Future  -     Lipid panel; Future  -     TSH, 3rd generation with Free T4 reflex; Future    Impaired fasting glucose  -     CBC; Future  -     Comprehensive metabolic panel; Future  -     Hemoglobin A1C; Future  -     Lipid panel; Future  -     TSH, 3rd generation with Free T4 reflex; Future    Benign prostatic hyperplasia, unspecified whether lower urinary tract symptoms present    Tension-type headache, not intractable, unspecified chronicity pattern    Memory loss    Hyperlipidemia, unspecified hyperlipidemia type  -     CBC; Future  -     Comprehensive metabolic panel; Future  -     Hemoglobin A1C; Future  -     Lipid panel; Future  -     TSH, 3rd generation with Free T4 reflex;  Future    Anxiety    Medicare annual wellness visit, subsequent          Subjective:      Patient ID: Ana Rosa Ericksons is a 76 y o  male  HPI    Pt is here by himself       HTN----Pt states BP at home top number 150-160  He is on lisinopril-HCTZ 20-25mg QD and amlodipine 5mg QD  Denies SE  Denies headache, SOB, CP       Anxiety/OCD---Pt is on prozac 40mg QD  He is on xanax 0 5mg qhs prn       Hypothyroidism---He is on levothyroxine 75mcg daily  Feels fine       IFG---follows low carb diet       Hyperlipidemia---follows low fat diet  Not on statins  He is on OTC niacin daily       Tension headache---use fioricet as needed, maybe 1-2 times per week  FU neurology for headache yearly    Mild dementia---FU neurology  He is on Aricept 10mg qhs  Denies side effects       FU urology Dr Blessing Hinds for BPH  He is on [place] which helped  FU opthalmology yearly       No smoking    1 glass of wine at night       Live with wife and son  Does all ADL's  Still drive  Denies recent falls    Denies depression      The following portions of the patient's history were reviewed and updated as appropriate: allergies, current medications, past family history, past medical history, past social history, past surgical history and problem list     Review of Systems   Constitutional: Negative for appetite change, chills and fever  HENT: Negative for congestion, ear pain, sinus pain and sore throat  Eyes: Negative for discharge and itching  Respiratory: Negative for apnea, cough, chest tightness, shortness of breath and wheezing  Cardiovascular: Negative for chest pain, palpitations and leg swelling  Gastrointestinal: Negative for abdominal pain, anal bleeding, constipation, diarrhea, nausea and vomiting  Endocrine: Negative for cold intolerance, heat intolerance and polyuria  Genitourinary: Negative for difficulty urinating and dysuria  Musculoskeletal: Negative for arthralgias, back pain and myalgias  Skin: Negative for rash  Neurological: Negative for dizziness and headaches  Psychiatric/Behavioral: Negative for agitation  Objective:      /72 (BP Location: Left arm, Patient Position: Sitting, Cuff Size: Adult)   Pulse 72   Temp 98 °F (36 7 °C) (Tympanic)   Resp 20   Ht 5' 8" (1 727 m)   Wt 76 kg (167 lb 9 6 oz)   SpO2 97%   BMI 25 48 kg/m²          Physical Exam  Constitutional:       Appearance: He is well-developed  HENT:      Head: Normocephalic and atraumatic  Eyes:      General:         Right eye: No discharge  Left eye: No discharge  Conjunctiva/sclera: Conjunctivae normal    Cardiovascular:      Rate and Rhythm: Normal rate and regular rhythm  Heart sounds: Normal heart sounds  No murmur heard  No friction rub  No gallop  Pulmonary:      Effort: Pulmonary effort is normal  No respiratory distress  Breath sounds: Normal breath sounds  No wheezing or rales  Abdominal:      General: Bowel sounds are normal  There is no distension  Palpations: Abdomen is soft  Tenderness: There is no abdominal tenderness  There is no guarding  Musculoskeletal:         General: Normal range of motion  Cervical back: Normal range of motion and neck supple  No tenderness  Right lower leg: No edema  Left lower leg: No edema  Lymphadenopathy:      Cervical: No cervical adenopathy  Neurological:      Mental Status: He is alert

## 2022-07-12 NOTE — PROGRESS NOTES
Chief Complaint   Patient presents with   [facility] [place] Wellness Visit     Subsequent   Follow-up     6 months  Health Maintenance   Topic Date Due    Hepatitis C Screening  Never done    COVID-19 Vaccine (4 - Booster for [employer] series) 02/02/2022    Medicare Annual Wellness Visit (AWV)  06/10/2022    Influenza Vaccine (1) 09/01/2022    Colorectal Cancer Screening  03/28/2023    Fall Risk  07/12/2023    Depression Screening  07/12/2023    BMI: Followup Plan  07/12/2023    BMI: Adult  07/12/2023    Pneumococcal Vaccine: 65+ Years  Completed    HIB Vaccine  Aged Out    Hepatitis B Vaccine  Aged Out    IPV Vaccine  Aged Out    Hepatitis A Vaccine  Aged Out    Meningococcal ACWY Vaccine  Aged Out    HPV Vaccine  Aged Out      Assessment and Plan:     Problem List Items Addressed This Visit        Endocrine    Hypothyroidism     7/2022 TSH normal  Continue levothyroxine 75mcg daily  Relevant Orders    CBC    Comprehensive metabolic panel    Hemoglobin A1C    Lipid panel    TSH, 3rd generation with Free T4 reflex    Impaired fasting glucose     7/2022 hgA1C 5 6 normal  Low carb diet  Relevant Orders    CBC    Comprehensive metabolic panel    Hemoglobin A1C    Lipid panel    TSH, 3rd generation with Free T4 reflex       Cardiovascular and Mediastinum    Essential hypertension - Primary     Uncontrolled  DASH diet  Increase amlodipine to 10mg QD  Continue prinzide 20-25mg QD  Relevant Medications    amLODIPine (NORVASC) 10 mg tablet    Other Relevant Orders    CBC    Comprehensive metabolic panel    Hemoglobin A1C    Lipid panel    TSH, 3rd generation with Free T4 reflex       Genitourinary    Benign prostatic hyperplasia     Continue Lolly per urology  Other    Anxiety     Stable  Continue prozac 40mg QD  Use xanax prn  Hyperlipidemia     7/2022 Lipid 179/106/52/106 ok  Low fat diet  Continue niacin              Relevant Orders    CBC Comprehensive metabolic panel    Hemoglobin A1C    Lipid panel    TSH, 3rd generation with Free T4 reflex    Memory loss     Continue aricept per neurology  Tension type headache     Use fioricet prn, maybe 1-2 times/week  Relevant Medications    amLODIPine (NORVASC) 10 mg tablet      Other Visit Diagnoses     Medicare annual wellness visit, subsequent            Reviewed lab in 7/2022  TSH normal  hgA1C 5 6 normal  Lipid 179/106/52/106 ok  CMP ok    Flu shot yearly    Got Covid19 vaccines and boosters  Pneumovax 2010 when he was 77  Got [id number] in 9/2015    Tdap 2012    Recommend shingrix    RTO in 6 months        POA---wife  Living will---DNR          BMI Counseling: Body mass index is 25 48 kg/m²  The BMI is above normal  Nutrition recommendations include decreasing portion sizes, encouraging healthy choices of fruits and vegetables, decreasing fast food intake, consuming healthier snacks and limiting drinks that contain sugar  Exercise recommendations include moderate physical activity 150 minutes/week  No pharmacotherapy was ordered  Rationale for BMI follow-up plan is due to patient being overweight or obese  Depression Screening and Follow-up Plan: Patient was screened for depression during today's encounter  They screened negative with a PHQ-2 score of 0  Preventive health issues were discussed with patient, and age appropriate screening tests were ordered as noted in patient's After Visit Summary  Personalized health advice and appropriate referrals for health education or preventive services given if needed, as noted in patient's After Visit Summary  History of Present Illness:     Patient presents for a Medicare Wellness Visit    HPI     Pt is here by himself       HTN----Pt states BP at home top number 150-160  He is on lisinopril-HCTZ 20-25mg QD and amlodipine 5mg QD  Denies SE  Denies headache, SOB, CP       Anxiety/OCD---Pt is on prozac 40mg QD   He is on xanax 0 5mg qhs prn       Hypothyroidism---He is on levothyroxine 75mcg daily  Feels fine       IFG---follows low carb diet       Hyperlipidemia---follows low fat diet  Not on statins  He is on OTC niacin daily       Tension headache---use fioricet as needed, maybe 1-2 times per week  FU neurology for headache yearly    Mild dementia---FU neurology  He is on Aricept 10mg qhs  Denies side effects       FU urology   [facility] for BPH  He is on [place] which helped  FU opthalmology yearly       No smoking    1 glass of wine at night       Live with wife and son  Does all ADL's  Still drive  Denies recent falls    Denies depression         Patient Care Team:  Blaire Curry MD as PCP - Christiano Esquivel MD as Endoscopist  Mike Vallecillo MD (Urology)     Review of Systems:     Review of Systems   Constitutional: Negative for appetite change, chills and fever  HENT: Negative for congestion, ear pain, sinus pain and sore throat  Eyes: Negative for discharge and itching  Respiratory: Negative for apnea, cough, chest tightness, shortness of breath and wheezing  Cardiovascular: Negative for chest pain, palpitations and leg swelling  Gastrointestinal: Negative for abdominal pain, anal bleeding, constipation, diarrhea, nausea and vomiting  Endocrine: Negative for cold intolerance, heat intolerance and polyuria  Genitourinary: Negative for difficulty urinating and dysuria  Musculoskeletal: Negative for arthralgias, back pain and myalgias  Skin: Negative for rash  Neurological: Negative for dizziness and headaches  Psychiatric/Behavioral: Negative for agitation          Problem List:     Patient Active Problem List   Diagnosis    Marital dysfunction    Anxiety    Benign prostatic hyperplasia    Cataract    Erectile dysfunction of non-organic origin    Hearing loss    Hyperlipidemia    Hypothyroidism    Impaired fasting glucose    Macrocytosis    Memory loss    Osteoarthritis    Primary localized osteoarthrosis of the hip    Tension type headache    PROVISIONAL Obsessive compulsive personality disorder    BMI 25 0-25 9,adult    Chronic intractable headache    Mild dementia (HCC)    Essential hypertension    Alcohol use    Osteochondroma of femur    Primary localized osteoarthritis of pelvic region and thigh      Past Medical and Surgical History:     Past Medical History:   Diagnosis Date    Abnormal weight loss     Anxiety     Bursitis of left hip     Cervical radiculopathy     Colon polyps     Depression     Nicotine dependence in remission     Non-toxic multinodular goiter      Past Surgical History:   Procedure Laterality Date   Rhona Cisneros Left     Dr Juanito Gomez; onset: 8/6/13    COLONOSCOPY      3/20/13, normal clon - Dr Corazon Strange, repeat in 5-10 years    HIP SURGERY      MT COLONOSCOPY FLX DX W/COLLJ SPEC WHEN PFRMD N/A 3/28/2018    Procedure: COLONOSCOPY;  Surgeon: Anaya Lloyd MD;  Location: [facility];   Service: Colorectal    THYROIDECTOMY, PARTIAL      TONSILLECTOMY      TOTAL HIP ARTHROPLASTY        Family History:     Family History   Problem Relation Age of Onset    Diabetes Father     Lung cancer Father     Alcohol abuse Paternal Uncle     Heart disease Paternal Uncle       Social History:     Social History     Socioeconomic History    Marital status: /Civil Union     Spouse name: None    Number of children: None    Years of education: completed some college    Highest education level: None   Occupational History    None   Tobacco Use    Smoking status: Former Smoker    Smokeless tobacco: Never Used   Substance and Sexual Activity    Alcohol use: No     Comment: social use as per [employer]    Drug use: No    Sexual activity: None   Other Topics Concern    None   Social History Narrative    Caffeine use    Marital relationship problem     Social Determinants of Health     Financial Resource Strain: Not on file Food Insecurity: Not on file   Transportation Needs: Not on file   Physical Activity: Not on file   Stress: Not on file   Social Connections: Not on file   Intimate Partner Violence: Not on file   Housing Stability: Not on file      Medications and Allergies:     Current Outpatient Medications   Medication Sig Dispense Refill    amLODIPine (NORVASC) 10 mg tablet Take 1 tablet (10 mg total) by mouth daily 90 tablet 1    Acetaminophen (TYLENOL PO) Take by mouth as needed      ALPRAZolam (XANAX) 0 5 mg tablet Take 1 tablet (0 5 mg total) by mouth daily at bedtime as needed for anxiety 90 tablet 0    butalbital-acetaminophen-caffeine (FIORICET,ESGIC) -40 mg per tablet Take 1 tablet by mouth daily as needed for headaches 90 tablet 0    donepezil (ARICEPT) 10 mg tablet Take 1 tablet (10 mg total) by mouth daily at bedtime 90 tablet 3    Dutasteride-Tamsulosin HCl (DMITRI) 0 5-0 4 MG CAPS Take 1 capsule by mouth daily      FLUoxetine (PROzac) 40 MG capsule Take 2 capsules (80 mg total) by mouth daily 180 capsule 3    levothyroxine 75 mcg tablet Take 1 tablet (75 mcg total) by mouth daily 90 tablet 3    lisinopril-hydrochlorothiazide (PRINZIDE,ZESTORETIC) 20-25 MG per tablet Take 1 tablet by mouth daily 90 tablet 1    niacin 500 mg tablet Take 500 mg by mouth daily with breakfast       No current facility-administered medications for this visit  Allergies   Allergen Reactions    Aspirin      Other reaction(s): Blood Disorders, rectal bleeding/friable prostate  Category:  Adverse Reaction;       Immunizations:     Immunization History   Administered Date(s) Administered    COVID-19 PFIZER VACCINE 0 3 ML IM 01/25/2021, 02/15/2021, 10/02/2021    H1N1, All Formulations 01/03/2010    INFLUENZA 09/28/2018, 09/16/2020    Influenza Split High Dose Preservative Free IM 09/16/2014, 09/29/2015, 09/29/2016    Influenza, seasonal, injectable 10/01/2012    Pneumococcal Conjugate 13-Valent 09/29/2015    Pneumococcal Polysaccharide PPV23 11/15/2010    Tdap 12/01/2012    Varicella 01/01/2013    Zoster 01/01/2013      Health Maintenance:         Topic Date Due    Hepatitis C Screening  Never done    Colorectal Cancer Screening  03/28/2023         Topic Date Due    COVID-19 Vaccine (4 - Booster for [employer] series) 02/02/2022    Influenza Vaccine (1) 09/01/2022      Medicare Screening Tests and Risk Assessments:     Roshan Olson is here for his Subsequent Wellness visit  Health Risk Assessment:   Patient rates overall health as good  Patient feels that their physical health rating is same  Patient is satisfied with their life  Eyesight was rated as same  Hearing was rated as same  Patient feels that their emotional and mental health rating is same  Patients states they are sometimes angry  Patient states they are sometimes unusually tired/fatigued  Pain experienced in the last 7 days has been some  Patient's pain rating has been 5/10  Patient states that he has experienced no weight loss or gain in last 6 months  Depression Screening:   PHQ-2 Score: 0      Fall Risk Screening: In the past year, patient has experienced: no history of falling in past year      Home Safety:  Patient does not have trouble with stairs inside or outside of their home  Patient has working smoke alarms and has working carbon monoxide detector  Home safety hazards include: none  Nutrition:   Current diet is Limited junk food and Regular  Medications:   Patient is currently taking over-the-counter supplements  OTC medications include: see medication list  Patient is able to manage medications  Activities of Daily Living (ADLs)/Instrumental Activities of Daily Living (IADLs):   Walk and transfer into and out of bed and chair?: Yes  Dress and groom yourself?: Yes    Bathe or shower yourself?: Yes    Feed yourself?  Yes  Do your laundry/housekeeping?: Yes  Manage your money, pay your bills and track your expenses?: Yes  Make your own meals?: Yes    Do your own shopping?: Yes    Previous Hospitalizations:   Any hospitalizations or ED visits within the last 12 months?: No      Advance Care Planning:   Living will: Yes    Durable POA for healthcare: Yes    Advanced directive: Yes    End of Life Decisions reviewed with patient: Yes    Provider agrees with end of life decisions: Yes      PREVENTIVE SCREENINGS      Cardiovascular Screening:    General: History Lipid Disorder and Screening Current      Diabetes Screening:     General: Screening Current      Colorectal Cancer Screening:     General: Screening Current      Prostate Cancer Screening:    General: Screening Not Indicated      Abdominal Aortic Aneurysm (AAA) Screening:    Risk factors include: tobacco use        Lung Cancer Screening:     General: Screening Not Indicated    Screening, Brief Intervention, and Referral to Treatment (SBIRT)    Screening  Typical number of drinks in a day: 1  Typical number of drinks in a week: 7  Interpretation: Low risk drinking behavior  Single Item Drug Screening:  How often have you used an illegal drug (including marijuana) or a prescription medication for non-medical reasons in the past year? never    Single Item Drug Screen Score: 0  Interpretation: Negative screen for possible drug use disorder    No exam data present     Physical Exam:     /72 (BP Location: Left arm, Patient Position: Sitting, Cuff Size: Adult)   Pulse 72   Temp 98 °F (36 7 °C) (Tympanic)   Resp 20   Ht 5' 8" (1 727 m)   Wt 76 kg (167 lb 9 6 oz)   SpO2 97%   BMI 25 48 kg/m²     Physical Exam  Vitals reviewed  Constitutional:       Appearance: Normal appearance  HENT:      Head: Normocephalic and atraumatic  Eyes:      General:         Right eye: No discharge  Left eye: No discharge  Conjunctiva/sclera: Conjunctivae normal    Neck:      Vascular: No carotid bruit  Cardiovascular:      Rate and Rhythm: Normal rate and regular rhythm  Heart sounds: Normal heart sounds  No murmur heard  No friction rub  No gallop  Pulmonary:      Effort: Pulmonary effort is normal  No respiratory distress  Breath sounds: Normal breath sounds  No wheezing or rales  Abdominal:      General: Bowel sounds are normal  There is no distension  Palpations: Abdomen is soft  Tenderness: There is no abdominal tenderness  Musculoskeletal:         General: No swelling, tenderness or deformity  Normal range of motion  Cervical back: Normal range of motion and neck supple  No muscular tenderness  Lymphadenopathy:      Cervical: No cervical adenopathy  Neurological:      Mental Status: He is alert     Psychiatric:         Mood and Affect: Mood normal           Elsa Laura MD

## 2022-07-18 DIAGNOSIS — F41.9 ANXIETY: ICD-10-CM

## 2022-07-19 RX ORDER — FLUOXETINE HYDROCHLORIDE 40 MG/1
80 CAPSULE ORAL DAILY
Qty: 180 CAPSULE | Refills: 3 | Status: SHIPPED | OUTPATIENT
Start: 2022-07-19

## 2022-07-25 DIAGNOSIS — R51.9 CHRONIC INTRACTABLE HEADACHE, UNSPECIFIED HEADACHE TYPE: ICD-10-CM

## 2022-07-25 DIAGNOSIS — G89.29 CHRONIC INTRACTABLE HEADACHE, UNSPECIFIED HEADACHE TYPE: ICD-10-CM

## 2022-07-25 DIAGNOSIS — E03.9 HYPOTHYROIDISM, UNSPECIFIED TYPE: ICD-10-CM

## 2022-07-25 RX ORDER — LEVOTHYROXINE SODIUM 0.07 MG/1
75 TABLET ORAL DAILY
Qty: 90 TABLET | Refills: 3 | Status: SHIPPED | OUTPATIENT
Start: 2022-07-25 | End: 2023-08-08

## 2022-07-25 RX ORDER — BUTALBITAL, ACETAMINOPHEN AND CAFFEINE 50; 325; 40 MG/1; MG/1; MG/1
1 TABLET ORAL DAILY PRN
Qty: 90 TABLET | Refills: 0 | Status: SHIPPED | OUTPATIENT
Start: 2022-07-25

## 2022-09-08 ENCOUNTER — TELEPHONE (OUTPATIENT)
Dept: FAMILY MEDICINE CLINIC | Facility: CLINIC | Age: 77
End: 2022-09-08

## 2022-09-08 DIAGNOSIS — I10 ESSENTIAL HYPERTENSION: ICD-10-CM

## 2022-09-08 RX ORDER — LISINOPRIL AND HYDROCHLOROTHIAZIDE 25; 20 MG/1; MG/1
TABLET ORAL
Qty: 90 TABLET | Refills: 0 | Status: SHIPPED | OUTPATIENT
Start: 2022-09-08

## 2022-09-08 NOTE — TELEPHONE ENCOUNTER
Patient received a notice in mail from the state, has to have form for 's license done by November  He just had a Medicare wellness in July  Does he need another appointment (insurance will not cover another physical) or can we complete form using him wellness info

## 2022-09-12 DIAGNOSIS — F41.9 ANXIETY: ICD-10-CM

## 2022-09-12 RX ORDER — ALPRAZOLAM 0.5 MG/1
0.5 TABLET ORAL
Qty: 90 TABLET | Refills: 0 | Status: SHIPPED | OUTPATIENT
Start: 2022-09-12

## 2022-11-16 ENCOUNTER — OFFICE VISIT (OUTPATIENT)
Dept: NEUROLOGY | Facility: CLINIC | Age: 77
End: 2022-11-16

## 2022-11-16 VITALS
SYSTOLIC BLOOD PRESSURE: 135 MMHG | DIASTOLIC BLOOD PRESSURE: 66 MMHG | HEART RATE: 70 BPM | WEIGHT: 174 LBS | BODY MASS INDEX: 26.46 KG/M2

## 2022-11-16 DIAGNOSIS — F03.A0 MILD DEMENTIA: Primary | ICD-10-CM

## 2022-11-16 NOTE — PROGRESS NOTES
Review of Systems   Constitutional: Negative  Negative for appetite change and fever  HENT: Negative  Negative for hearing loss, tinnitus, trouble swallowing and voice change  Eyes: Negative  Negative for photophobia, pain and visual disturbance  Respiratory: Negative  Negative for shortness of breath  Cardiovascular: Negative  Negative for palpitations  Gastrointestinal: Negative  Negative for nausea and vomiting  Endocrine: Negative  Negative for cold intolerance  Genitourinary: Negative  Negative for dysuria, frequency and urgency  Musculoskeletal: Negative  Negative for gait problem, myalgias and neck pain  Skin: Negative  Negative for rash  Allergic/Immunologic: Negative  Neurological: Negative  Negative for dizziness, tremors, seizures, syncope, facial asymmetry, speech difficulty, weakness, light-headedness, numbness and headaches  Hematological: Negative  Does not bruise/bleed easily  Psychiatric/Behavioral: Negative for confusion, hallucinations and sleep disturbance  Memory Issues     All other systems reviewed and are negative

## 2022-11-16 NOTE — ASSESSMENT & PLAN NOTE
Symptoms appear to be overall stable  Sublette 21/30 unchanged from when last examined a year ago  Overall functioning well at home  Symptoms are not interfering with daily activity  Mood appears to be fairly well controlled on current medications  He will remain on donepezil 10 mg daily  He will contact the office prior to follow-up should he develop any issues  Encouraged to continue to remain physically and mentally active  He will follow-up in a year, if symptoms remain stable further follow-up can be with PCP

## 2022-11-16 NOTE — PROGRESS NOTES
Patient ID: Ramsey Rico is a 76 y o  male    Assessment/Plan:    Mild dementia ([facility] [address] )  Symptoms appear to be overall stable  [place] 21/30 unchanged from when last examined a year ago  Overall functioning well at home  Symptoms are not interfering with daily activity  Mood appears to be fairly well controlled on current medications  He will remain on donepezil 10 mg daily  He will contact the office prior to follow-up should he develop any issues  Encouraged to continue to remain physically and mentally active  He will follow-up in a year, if symptoms remain stable further follow-up can be with PCP  Diagnoses and all orders for this visit:    Mild dementia        Subjective:      Mr Evelyn Lira is a male with mild dementia,who presents for follow up  To review, wife reports symptoms began gradually in 2011 after alf with slow progression  He was described as having behavioral issues, being racist and possessive  He has short and long term memory impairment  He is on Prozac for OCD and takes Xanax prn  His mother had dementia in her 80's and had OCD  He also has a history of frontal headaches for which he takes Fiorcet about 5 times a week  He remains on donepezil 10mg daily  He reports also taking Prevagen daily which he feels may help  Overall he is doing well  He has not noted any changes  He fills his pill box weekly  He administered his own medications  He admits to forgetting things if he is instructed to perform multiple tasks at a time  Can repeat himself  He denies any changes in mood  He is sleeping well  No hallucinations  He was able to perform all activities of daily living without issues  He continues to drive without any accidents or difficulty  Prior workup:   MRI revealed mild atrophy with early microvascular disease and neuroquant imaging consistent with neurodegenerative process     Neuropsych testing was consistent with mild cognitive impairment with no clinically significant anxiety or depression  Objective:    /66 (BP Location: Left arm, Patient Position: Sitting, Cuff Size: Standard)   Pulse 70   Wt 78 9 kg (174 lb)   BMI 26 46 kg/m²       Physical Exam  Vitals reviewed  Eyes:      Extraocular Movements: Extraocular movements intact  Neurological:      Motor: Motor strength is normal    Psychiatric:         Speech: Speech normal          Neurological Exam  Mental Status   Oriented to person, place, time and situation  Orientation: Not city  Memory: Recall 0/5  Unable to copy figure  Clock drawing is normal  Speech is normal  Able to name objects  Follows complex commands  Language: Reduced fluency  Attention and concentration are normal   MOCA 21/30  Cranial Nerves  CN II: Right funduscopic exam: not visualized  Left funduscopic exam: not visualized  CN III, IV, VI: Extraocular movements intact bilaterally  Right pupil: 1 mm  Left pupil: 1 mm  CN VII: Full and symmetric facial movement  CN VIII: Hearing is normal   CN IX, X: Palate elevates symmetrically  CN XI: Shoulder shrug strength is normal   CN XII: Tongue midline without atrophy or fasciculations  Motor   Normal muscle tone  Strength is 5/5 throughout all four extremities  Sensory  Light touch is normal in upper and lower extremities  Coordination  Right: Finger-to-nose normal  Rapid alternating movement normal Left: Finger-to-nose normal  Rapid alternating movement normal     Gait  Casual gait is normal including stance, stride, and arm swing  Able to rise from chair without using arms  Slightly stooped  Mild generalized slowness           Current Outpatient Medications on File Prior to Visit   Medication Sig Dispense Refill   • Acetaminophen (TYLENOL PO) Take by mouth as needed     • ALPRAZolam (XANAX) 0 5 mg tablet Take 1 tablet (0 5 mg total) by mouth daily at bedtime as needed for anxiety 90 tablet 0   • amLODIPine (NORVASC) 10 mg tablet Take 1 tablet (10 mg total) by mouth daily 90 tablet 1   • butalbital-acetaminophen-caffeine (FIORICET,ESGIC) -40 mg per tablet Take 1 tablet by mouth daily as needed for headaches 90 tablet 0   • donepezil (ARICEPT) 10 mg tablet Take 1 tablet (10 mg total) by mouth daily at bedtime 90 tablet 3   • Dutasteride-Tamsulosin HCl 0 5-0 4 MG CAPS Take 1 capsule by mouth daily     • FLUoxetine (PROzac) 40 MG capsule Take 2 capsules (80 mg total) by mouth daily 180 capsule 3   • levothyroxine 75 mcg tablet Take 1 tablet (75 mcg total) by mouth daily 90 tablet 3   • lisinopril-hydrochlorothiazide (PRINZIDE,ZESTORETIC) 20-25 MG per tablet TAKE 1 TABLET DAILY 90 tablet 0   • niacin 500 mg tablet Take 500 mg by mouth daily with breakfast       No current facility-administered medications on file prior to visit           Silke Gayle MD  Movement disorder physician  [facility] [employer] [facility]

## 2022-12-05 DIAGNOSIS — F03.A0 MILD DEMENTIA: ICD-10-CM

## 2022-12-06 RX ORDER — DONEPEZIL HYDROCHLORIDE 10 MG/1
TABLET, FILM COATED ORAL
Qty: 90 TABLET | Refills: 3 | Status: SHIPPED | OUTPATIENT
Start: 2022-12-06

## 2022-12-07 DIAGNOSIS — I10 ESSENTIAL HYPERTENSION: ICD-10-CM

## 2022-12-07 RX ORDER — LISINOPRIL AND HYDROCHLOROTHIAZIDE 25; 20 MG/1; MG/1
TABLET ORAL
Qty: 90 TABLET | Refills: 3 | Status: SHIPPED | OUTPATIENT
Start: 2022-12-07

## 2022-12-27 ENCOUNTER — OFFICE VISIT (OUTPATIENT)
Dept: FAMILY MEDICINE CLINIC | Facility: CLINIC | Age: 77
End: 2022-12-27

## 2022-12-27 VITALS
SYSTOLIC BLOOD PRESSURE: 148 MMHG | WEIGHT: 167 LBS | OXYGEN SATURATION: 97 % | BODY MASS INDEX: 25.31 KG/M2 | HEIGHT: 68 IN | TEMPERATURE: 97.7 F | DIASTOLIC BLOOD PRESSURE: 80 MMHG | RESPIRATION RATE: 16 BRPM | HEART RATE: 76 BPM

## 2022-12-27 DIAGNOSIS — F41.9 ANXIETY: ICD-10-CM

## 2022-12-27 DIAGNOSIS — J40 BRONCHITIS: Primary | ICD-10-CM

## 2022-12-27 RX ORDER — CEPHALEXIN 500 MG/1
CAPSULE ORAL
COMMUNITY
Start: 2022-12-21

## 2022-12-27 RX ORDER — AZITHROMYCIN 250 MG/1
TABLET, FILM COATED ORAL
Qty: 6 TABLET | Refills: 0 | Status: SHIPPED | OUTPATIENT
Start: 2022-12-27 | End: 2022-12-31

## 2022-12-27 RX ORDER — ALPRAZOLAM 0.5 MG/1
0.5 TABLET ORAL
Qty: 90 TABLET | Refills: 0 | Status: SHIPPED | OUTPATIENT
Start: 2022-12-27 | End: 2023-04-24 | Stop reason: SDUPTHER

## 2022-12-27 RX ORDER — BENZONATATE 200 MG/1
200 CAPSULE ORAL 3 TIMES DAILY PRN
Qty: 20 CAPSULE | Refills: 0 | Status: SHIPPED | OUTPATIENT
Start: 2022-12-27

## 2022-12-27 NOTE — PROGRESS NOTES
Name: Kwaku Alfredo      : 1945      MRN: [id number]  Encounter Provider: Avery Romero MD  Encounter Date: 2022   Encounter department: [address]    Chief Complaint   Patient presents with   • Cough     For about 2 weeks  Health Maintenance   Topic Date Due   • Hepatitis C Screening  Never done   • Hepatitis B Vaccine (1 of 3 - 3-dose series) Never done   • COVID-19 Vaccine (4 - Booster for Pfizer series) 2021   • Influenza Vaccine (1) 2022   • Colorectal Cancer Screening  2023   • Fall Risk  2023   • Depression Screening  2023   • Medicare Annual Wellness Visit (AWV)  2023   • BMI: Followup Plan  2023   • BMI: Adult  2023   • Pneumococcal Vaccine: 65+ Years  Completed   • HIB Vaccine  Aged Out   • IPV Vaccine  Aged Out   • Hepatitis A Vaccine  Aged Out   • Meningococcal ACWY Vaccine  Aged Out   • HPV Vaccine  Aged Out     Assessment & Plan     1  Bronchitis  -     azithromycin (ZITHROMAX) 250 mg tablet; Take 2 tabs on day 1, then take 1 tab daily from day 2-day 5   -     benzonatate (TESSALON) 200 MG capsule; Take 1 capsule (200 mg total) by mouth 3 (three) times a day as needed for cough        A lot of fluids and rest  Tylenol or motrin for fever or pain  Give zpack  Side effects educated patient  Give cough medication  Side effects educated patient  Call office if symptoms no improving or worse  Subjective      HPI     Pt is here with his wife  C/o cough for 2 5 weeks  Cough with phlegm, no blood in it  Wheezing  No Sob or chest tightness  Nasal congestion and sore throat  Sore throat is better now but still cough  Denies fever  Denies n/v/diarrhea  Tried OTC cough drops and robitussin but no help  Refused [id number] test  Wife is ok  No smoking  Denies hx of asthma  Review of Systems   Constitutional: Negative for appetite change, chills and fever  HENT: Positive for congestion and sore throat  Negative for ear pain and sinus pain  Eyes: Negative for discharge and itching  Respiratory: Positive for cough and wheezing  Negative for apnea, chest tightness and shortness of breath  Cardiovascular: Negative for chest pain, palpitations and leg swelling  Gastrointestinal: Negative for abdominal pain, anal bleeding, constipation, diarrhea, nausea and vomiting  Endocrine: Negative for cold intolerance, heat intolerance and polyuria  Genitourinary: Negative for difficulty urinating and dysuria  Musculoskeletal: Negative for arthralgias, back pain and myalgias  Skin: Negative for rash  Neurological: Negative for dizziness and headaches  Psychiatric/Behavioral: Negative for agitation         Current Outpatient Medications on File Prior to Visit   Medication Sig   • Acetaminophen (TYLENOL PO) Take by mouth as needed   • ALPRAZolam (XANAX) 0 5 mg tablet Take 1 tablet (0 5 mg total) by mouth daily at bedtime as needed for anxiety   • amLODIPine (NORVASC) 10 mg tablet Take 1 tablet (10 mg total) by mouth daily   • butalbital-acetaminophen-caffeine (FIORICET,ESGIC) -40 mg per tablet Take 1 tablet by mouth daily as needed for headaches   • cephalexin (KEFLEX) 500 mg capsule    • donepezil (ARICEPT) 10 mg tablet TAKE 1 TABLET DAILY AT BEDTIME   • Dutasteride-Tamsulosin HCl 0 5-0 4 MG CAPS Take 1 capsule by mouth daily   • FLUoxetine (PROzac) 40 MG capsule Take 2 capsules (80 mg total) by mouth daily   • levothyroxine 75 mcg tablet Take 1 tablet (75 mcg total) by mouth daily   • lisinopril-hydrochlorothiazide (PRINZIDE,ZESTORETIC) 20-25 MG per tablet TAKE 1 TABLET DAILY   • niacin 500 mg tablet Take 500 mg by mouth daily with breakfast       Objective     /80 (BP Location: Left arm, Patient Position: Sitting, Cuff Size: Adult)   Pulse 76   Temp 97 7 °F (36 5 °C) (Tympanic)   Resp 16   Ht 5' 8" (1 727 m)   Wt 75 8 kg (167 lb)   SpO2 97%   BMI 25 39 kg/m²     Physical Exam  Constitutional:       Appearance: He is well-developed  HENT:      Head: Normocephalic and atraumatic  Right Ear: Tympanic membrane normal       Left Ear: Tympanic membrane normal       Mouth/Throat:      Pharynx: No oropharyngeal exudate or posterior oropharyngeal erythema  Eyes:      General:         Right eye: No discharge  Left eye: No discharge  Conjunctiva/sclera: Conjunctivae normal    Cardiovascular:      Rate and Rhythm: Normal rate and regular rhythm  Heart sounds: Normal heart sounds  No murmur heard  No friction rub  No gallop  Pulmonary:      Effort: Pulmonary effort is normal  No respiratory distress  Breath sounds: Normal breath sounds  No wheezing or rales  Abdominal:      General: Bowel sounds are normal       Palpations: Abdomen is soft  Musculoskeletal:         General: Normal range of motion  Cervical back: Normal range of motion and neck supple  Neurological:      Mental Status: He is alert         Sean Alfonso MD

## 2022-12-29 DIAGNOSIS — I10 ESSENTIAL HYPERTENSION: ICD-10-CM

## 2022-12-29 DIAGNOSIS — F41.9 ANXIETY: ICD-10-CM

## 2022-12-29 RX ORDER — AMLODIPINE BESYLATE 10 MG/1
10 TABLET ORAL DAILY
Qty: 90 TABLET | Refills: 1 | Status: SHIPPED | OUTPATIENT
Start: 2022-12-29

## 2022-12-29 RX ORDER — ALPRAZOLAM 0.5 MG/1
0.5 TABLET ORAL
Qty: 90 TABLET | Refills: 0 | Status: CANCELLED | OUTPATIENT
Start: 2022-12-29

## 2023-01-05 ENCOUNTER — RA CDI HCC (OUTPATIENT)
Dept: OTHER | Facility: HOSPITAL | Age: 78
End: 2023-01-05

## 2023-01-12 ENCOUNTER — OFFICE VISIT (OUTPATIENT)
Dept: FAMILY MEDICINE CLINIC | Facility: CLINIC | Age: 78
End: 2023-01-12

## 2023-01-12 VITALS
BODY MASS INDEX: 25.43 KG/M2 | OXYGEN SATURATION: 97 % | SYSTOLIC BLOOD PRESSURE: 147 MMHG | DIASTOLIC BLOOD PRESSURE: 66 MMHG | WEIGHT: 167.8 LBS | HEART RATE: 68 BPM | HEIGHT: 68 IN | TEMPERATURE: 96.9 F | RESPIRATION RATE: 16 BRPM

## 2023-01-12 DIAGNOSIS — F60.5 OBSESSIVE COMPULSIVE PERSONALITY DISORDER (HCC): ICD-10-CM

## 2023-01-12 DIAGNOSIS — F03.A4 MILD DEMENTIA WITH ANXIETY, UNSPECIFIED DEMENTIA TYPE: ICD-10-CM

## 2023-01-12 DIAGNOSIS — R73.01 IMPAIRED FASTING GLUCOSE: ICD-10-CM

## 2023-01-12 DIAGNOSIS — F41.9 ANXIETY: ICD-10-CM

## 2023-01-12 DIAGNOSIS — Z78.9 ALCOHOL USE: ICD-10-CM

## 2023-01-12 DIAGNOSIS — E03.9 HYPOTHYROIDISM, UNSPECIFIED TYPE: ICD-10-CM

## 2023-01-12 DIAGNOSIS — E78.5 HYPERLIPIDEMIA, UNSPECIFIED HYPERLIPIDEMIA TYPE: ICD-10-CM

## 2023-01-12 DIAGNOSIS — I10 ESSENTIAL HYPERTENSION: ICD-10-CM

## 2023-01-12 DIAGNOSIS — G47.00 INSOMNIA, UNSPECIFIED TYPE: Primary | ICD-10-CM

## 2023-01-12 RX ORDER — TRAZODONE HYDROCHLORIDE 50 MG/1
50 TABLET ORAL
Qty: 30 TABLET | Refills: 0 | Status: SHIPPED | OUTPATIENT
Start: 2023-01-12

## 2023-01-12 RX ORDER — AZITHROMYCIN 250 MG/1
TABLET, FILM COATED ORAL
COMMUNITY
End: 2023-01-12 | Stop reason: ALTCHOICE

## 2023-01-12 NOTE — PROGRESS NOTES
Name: Darby Miranda      : 1945      MRN: [id number]  Encounter Provider: Bjorn Fuller MD  Encounter Date: 2023   Encounter department: [address] [place] [address] Drive    Chief Complaint   Patient presents with   • Follow-up     6 month      Health Maintenance   Topic Date Due   • Hepatitis C Screening  Never done   • COVID-19 Vaccine (4 - Booster for Pfizer series) 2021   • Influenza Vaccine (1) 2022   • Colorectal Cancer Screening  2023   • Depression Screening  2023   • BMI: Followup Plan  2023   • Fall Risk  2023   • Medicare Annual Wellness Visit (AWV)  2023   • BMI: Adult  2024   • Pneumococcal Vaccine: 65+ Years  Completed   • HIB Vaccine  Aged Out   • IPV Vaccine  Aged Out   • Hepatitis A Vaccine  Aged Out   • Meningococcal ACWY Vaccine  Aged Out   • HPV Vaccine  Aged Out       Assessment & Plan     1  Insomnia, unspecified type  Assessment & Plan:  Giver trazodone 50mg qhs  SE educated pt  Orders:  -     traZODone (DESYREL) 50 mg tablet; Take 1 tablet (50 mg total) by mouth daily at bedtime    2  Anxiety  Assessment & Plan:  Continue prozac 80mg QD  Use xanax 0 5mg daily prn  SE educated pt  Do not use it with alcohol  3  Obsessive compulsive personality disorder (HCC)  Assessment & Plan:  Continue prozac 80mg QD  4  Essential hypertension  Assessment & Plan:  Controlled  DASH diet  Continue amlodipine 10mg QD, Prinzide 20-25mg QD  Orders:  -     Comprehensive metabolic panel; Future; Expected date: 2023  -     Hemoglobin A1C; Future; Expected date: 2023  -     Lipid panel; Future; Expected date: 2023  -     TSH, 3rd generation with Free T4 reflex; Future; Expected date: 2023    5  Hypothyroidism, unspecified type  Assessment & Plan:  2023 TSH normal  Continue levothyroxine 75 mcg daily  Orders:  -     Comprehensive metabolic panel;  Future; Expected date: 2023  -     Hemoglobin A1C; Future; Expected date: 07/01/2023  -     Lipid panel; Future; Expected date: 07/01/2023  -     TSH, 3rd generation with Free T4 reflex; Future; Expected date: 07/01/2023    6  Impaired fasting glucose  Assessment & Plan:  1/2023 HgA1C 5 7 Advised pt to follow low carb diet  Orders:  -     Comprehensive metabolic panel; Future; Expected date: 07/01/2023  -     Hemoglobin A1C; Future; Expected date: 07/01/2023  -     Lipid panel; Future; Expected date: 07/01/2023  -     TSH, 3rd generation with Free T4 reflex; Future; Expected date: 07/01/2023    7  Hyperlipidemia, unspecified hyperlipidemia type  Assessment & Plan:  1/2023 lipid showed elevated TG  Low fat diet  Continue niacin  Orders:  -     Comprehensive metabolic panel; Future; Expected date: 07/01/2023  -     Hemoglobin A1C; Future; Expected date: 07/01/2023  -     Lipid panel; Future; Expected date: 07/01/2023  -     TSH, 3rd generation with Free T4 reflex; Future; Expected date: 07/01/2023    8  Mild dementia with anxiety, unspecified dementia type  Assessment & Plan:  Continue aricept per neurology  9  Alcohol use  Assessment & Plan:  Limit to 2 drinks/day, 7 drinks/week  BMI Counseling: Body mass index is 25 51 kg/m²  The BMI is above normal  Nutrition recommendations include decreasing portion sizes, encouraging healthy choices of fruits and vegetables, decreasing fast food intake, consuming healthier snacks and limiting drinks that contain sugar  Exercise recommendations include exercising 3-5 times per week  No pharmacotherapy was ordered  Rationale for BMI follow-up plan is due to patient being overweight or obese  Reviewed lab in 1/2023  TSH normal  CBC showed Plt 359 slightly elevated  hgA1C 5 7 borderline  CMP ok  Lipid 172/163/51/88 elevated TG  Flu shot yearly  Got it in 10/2022 per pt  Got Covid19 vaccines and boosters    Pneumovax 2010 when he was 77   Got [id number] in 9/2015     Recommend shingrix    RTO in 6 months         Subjective      HPI     Pt is here with his wife  Cough is gone  Cannot sleep well at night  He uses xanax 0 5mg before bedtime  He also drinks alcohol 2 drinks/day recently  Stress at home recently  Water pipe broken/washdish broken etc      Anxiety/OCD---Pt is on prozac 80mg QD       HTN----BP at home top number 140-150 per pt  Does not remember the bottom number  He is on lisinopril-HCTZ 20-25mg QD and amlodipine 5mg QD  Denies SE  Denies headache, SOB, CP       Hypothyroidism---He is on levothyroxine 75mcg daily  Feels fine       IFG---follows low carb diet       Hyperlipidemia---follows low fat diet  Not on statins  He is on OTC niacin daily       Tension headache---use fioricet as needed, maybe once per week  FU neurology yearly    Mild dementia---FU neurology  He is on Aricept 10mg qhs  Denies side effects       FU urology   [facility] for BPH  He is on [place] which helped  FU opthalmology yearly       No smoking    2 drinks of wine at night       Live with wife and son  Does all ADL's  Still drive  Denies recent falls    Denies depression        Review of Systems   Constitutional: Negative for appetite change, chills and fever  HENT: Negative for congestion, ear pain, sinus pain and sore throat  Eyes: Negative for discharge and itching  Respiratory: Negative for apnea, cough, chest tightness, shortness of breath and wheezing  Cardiovascular: Negative for chest pain, palpitations and leg swelling  Gastrointestinal: Negative for abdominal pain, anal bleeding, constipation, diarrhea, nausea and vomiting  Endocrine: Negative for cold intolerance, heat intolerance and polyuria  Genitourinary: Negative for difficulty urinating and dysuria  Musculoskeletal: Negative for arthralgias, back pain and myalgias  Skin: Negative for rash  Neurological: Negative for dizziness and headaches  Psychiatric/Behavioral: Positive for sleep disturbance   Negative for agitation, self-injury and suicidal ideas  The patient is nervous/anxious  Current Outpatient Medications on File Prior to Visit   Medication Sig   • Acetaminophen (TYLENOL PO) Take by mouth as needed   • ALPRAZolam (XANAX) 0 5 mg tablet Take 1 tablet (0 5 mg total) by mouth daily at bedtime as needed for anxiety   • amLODIPine (NORVASC) 10 mg tablet Take 1 tablet (10 mg total) by mouth daily   • butalbital-acetaminophen-caffeine (FIORICET,ESGIC) -40 mg per tablet Take 1 tablet by mouth daily as needed for headaches   • cephalexin (KEFLEX) 500 mg capsule    • donepezil (ARICEPT) 10 mg tablet TAKE 1 TABLET DAILY AT BEDTIME   • Dutasteride-Tamsulosin HCl 0 5-0 4 MG CAPS Take 1 capsule by mouth daily   • FLUoxetine (PROzac) 40 MG capsule Take 2 capsules (80 mg total) by mouth daily   • levothyroxine 75 mcg tablet Take 1 tablet (75 mcg total) by mouth daily   • lisinopril-hydrochlorothiazide (PRINZIDE,ZESTORETIC) 20-25 MG per tablet TAKE 1 TABLET DAILY   • niacin 500 mg tablet Take 500 mg by mouth daily with breakfast   • [DISCONTINUED] azithromycin (ZITHROMAX) 250 mg tablet azithromycin 250 mg tablet   • [DISCONTINUED] benzonatate (TESSALON) 200 MG capsule Take 1 capsule (200 mg total) by mouth 3 (three) times a day as needed for cough       Objective     /66 (BP Location: Left arm, Patient Position: Sitting)   Pulse 68   Temp (!) 96 9 °F (36 1 °C) (Tympanic)   Resp 16   Ht 5' 8" (1 727 m)   Wt 76 1 kg (167 lb 12 8 oz)   SpO2 97%   BMI 25 51 kg/m²     Physical Exam  Constitutional:       Appearance: He is well-developed  HENT:      Head: Normocephalic and atraumatic  Eyes:      General:         Right eye: No discharge  Left eye: No discharge  Conjunctiva/sclera: Conjunctivae normal    Cardiovascular:      Rate and Rhythm: Normal rate and regular rhythm  Heart sounds: Normal heart sounds  No murmur heard  No gallop     Pulmonary:      Effort: Pulmonary effort is normal  No respiratory distress  Breath sounds: Normal breath sounds  No wheezing or rales  Abdominal:      General: Bowel sounds are normal  There is no distension  Palpations: Abdomen is soft  Tenderness: There is no abdominal tenderness  There is no guarding  Musculoskeletal:         General: Normal range of motion  Cervical back: Normal range of motion and neck supple  No tenderness  Right lower leg: No edema  Left lower leg: No edema  Lymphadenopathy:      Cervical: No cervical adenopathy  Neurological:      Mental Status: He is alert         Brent Bernal MD

## 2023-02-03 ENCOUNTER — TELEPHONE (OUTPATIENT)
Dept: FAMILY MEDICINE CLINIC | Facility: CLINIC | Age: 78
End: 2023-02-03

## 2023-02-03 NOTE — TELEPHONE ENCOUNTER
Patient started on Trazadone, he was taking 2 hs  Instead of 1 and he drinks wine  Wife and son found him on floor and unable to get him to stand up  No injuries but called ambulance to help get him up  Patient refused to go to hospital   Wife took away the Trazadone, does not want him to continue this medication

## 2023-04-24 ENCOUNTER — OFFICE VISIT (OUTPATIENT)
Dept: FAMILY MEDICINE CLINIC | Facility: CLINIC | Age: 78
End: 2023-04-24

## 2023-04-24 VITALS
SYSTOLIC BLOOD PRESSURE: 142 MMHG | HEIGHT: 68 IN | OXYGEN SATURATION: 99 % | RESPIRATION RATE: 18 BRPM | BODY MASS INDEX: 25.69 KG/M2 | TEMPERATURE: 96 F | HEART RATE: 67 BPM | DIASTOLIC BLOOD PRESSURE: 63 MMHG | WEIGHT: 169.5 LBS

## 2023-04-24 DIAGNOSIS — R82.90 ABNORMAL URINE: ICD-10-CM

## 2023-04-24 DIAGNOSIS — R31.21 ASYMPTOMATIC MICROSCOPIC HEMATURIA: Primary | ICD-10-CM

## 2023-04-24 DIAGNOSIS — R01.1 HEART MURMUR: ICD-10-CM

## 2023-04-24 DIAGNOSIS — F41.9 ANXIETY: ICD-10-CM

## 2023-04-24 LAB
SL AMB  POCT GLUCOSE, UA: ABNORMAL
SL AMB LEUKOCYTE ESTERASE,UA: ABNORMAL
SL AMB POCT BILIRUBIN,UA: ABNORMAL
SL AMB POCT BLOOD,UA: ABNORMAL
SL AMB POCT CLARITY,UA: CLEAR
SL AMB POCT COLOR,UA: YELLOW
SL AMB POCT KETONES,UA: ABNORMAL
SL AMB POCT NITRITE,UA: ABNORMAL
SL AMB POCT PH,UA: 6
SL AMB POCT SPECIFIC GRAVITY,UA: 1.02
SL AMB POCT URINE PROTEIN: ABNORMAL
SL AMB POCT UROBILINOGEN: ABNORMAL

## 2023-04-24 RX ORDER — ALPRAZOLAM 0.5 MG/1
0.5 TABLET ORAL
Qty: 90 TABLET | Refills: 0 | Status: SHIPPED | OUTPATIENT
Start: 2023-04-24

## 2023-04-24 NOTE — PROGRESS NOTES
Name: Laure Felder      : 1945      MRN: [id number]  Encounter Provider: Margareth Suresh MD  Encounter Date: 2023   Encounter department: [address] [place] [address]     1  Asymptomatic microscopic hematuria  -     POCT urine dip    2  Abnormal urine  -     Urine culture; Future  -     Urine culture    3  Anxiety  -     ALPRAZolam (XANAX) 0 5 mg tablet; Take 1 tablet (0 5 mg total) by mouth daily at bedtime as needed for anxiety    4  Heart murmur  -     Echo complete w/ contrast if indicated; Future; Expected date: 2023    UA today showed + leuk, negative for blood  Will send for urine culture  Check Echo  Subjective      HPI     Pt is here by himself  Pt states he saw urology Dr [place] for BPH last month  Pt states urine study showed blood  Pt did not see any blood in his urine  Denies dysuria  Denies fever, SOB, CP, n/v/abd pain  He is on Lolly for BPH  Anxiety---Need refill for xanax today  Review of Systems   Constitutional: Negative for appetite change, chills and fever  HENT: Negative for congestion, ear pain, sinus pain and sore throat  Eyes: Negative for discharge and itching  Respiratory: Negative for apnea, cough, chest tightness, shortness of breath and wheezing  Cardiovascular: Negative for chest pain, palpitations and leg swelling  Gastrointestinal: Negative for abdominal pain, anal bleeding, constipation, diarrhea, nausea and vomiting  Endocrine: Negative for cold intolerance, heat intolerance and polyuria  Genitourinary: Negative for difficulty urinating, dysuria, flank pain, frequency and hematuria  Musculoskeletal: Negative for arthralgias, back pain and myalgias  Skin: Negative for rash  Neurological: Negative for dizziness and headaches  Psychiatric/Behavioral: Negative for agitation         Current Outpatient Medications on File Prior to Visit   Medication Sig   • Acetaminophen (TYLENOL PO) "Take by mouth as needed   • amLODIPine (NORVASC) 10 mg tablet Take 1 tablet (10 mg total) by mouth daily   • butalbital-acetaminophen-caffeine (FIORICET,ESGIC) -40 mg per tablet Take 1 tablet by mouth daily as needed for headaches   • Dutasteride-Tamsulosin HCl 0 5-0 4 MG CAPS Take 1 capsule by mouth daily   • FLUoxetine (PROzac) 40 MG capsule Take 2 capsules (80 mg total) by mouth daily   • levothyroxine 75 mcg tablet Take 1 tablet (75 mcg total) by mouth daily   • lisinopril-hydrochlorothiazide (PRINZIDE,ZESTORETIC) 20-25 MG per tablet TAKE 1 TABLET DAILY   • niacin 500 mg tablet Take 500 mg by mouth daily with breakfast   • traZODone (DESYREL) 50 mg tablet Take 1 tablet (50 mg total) by mouth daily at bedtime   • [DISCONTINUED] ALPRAZolam (XANAX) 0 5 mg tablet Take 1 tablet (0 5 mg total) by mouth daily at bedtime as needed for anxiety   • donepezil (ARICEPT) 10 mg tablet TAKE 1 TABLET DAILY AT BEDTIME (Patient not taking: Reported on 4/24/2023)   • [DISCONTINUED] cephalexin (KEFLEX) 500 mg capsule  (Patient not taking: Reported on 4/24/2023)       Objective     /63   Pulse 67   Temp (!) 96 °F (35 6 °C) (Tympanic)   Resp 18   Ht 5' 8\" (1 727 m)   Wt 76 9 kg (169 lb 8 oz)   SpO2 99%   BMI 25 77 kg/m²     Physical Exam  Constitutional:       Appearance: He is well-developed  HENT:      Head: Normocephalic and atraumatic  Eyes:      General:         Right eye: No discharge  Left eye: No discharge  Conjunctiva/sclera: Conjunctivae normal    Cardiovascular:      Rate and Rhythm: Normal rate and regular rhythm  Heart sounds: Murmur heard  No friction rub  No gallop  Pulmonary:      Effort: Pulmonary effort is normal  No respiratory distress  Breath sounds: Normal breath sounds  No wheezing or rales  Abdominal:      General: Bowel sounds are normal  There is no distension  Palpations: Abdomen is soft  Tenderness: There is no abdominal tenderness   There is " no right CVA tenderness, left CVA tenderness or guarding  Musculoskeletal:         General: Normal range of motion  Cervical back: Normal range of motion and neck supple  Neurological:      Mental Status: He is alert         Tramaine Cabral MD

## 2023-04-27 ENCOUNTER — TELEPHONE (OUTPATIENT)
Dept: FAMILY MEDICINE CLINIC | Facility: CLINIC | Age: 78
End: 2023-04-27

## 2023-04-27 NOTE — TELEPHONE ENCOUNTER
----- Message from Armani Fair MD sent at 4/27/2023  1:15 PM EDT -----  Urine culture does not show obvious UTI  No antibiotics now

## 2023-04-28 LAB
BACTERIA UR CULT: ABNORMAL
BACTERIA UR CULT: ABNORMAL

## 2023-06-10 ENCOUNTER — HOSPITAL ENCOUNTER (INPATIENT)
Facility: HOSPITAL | Age: 78
LOS: 3 days | Discharge: HOME HEALTH | End: 2023-06-13
Attending: EMERGENCY MEDICINE | Admitting: INTERNAL MEDICINE
Payer: COMMERCIAL

## 2023-06-10 ENCOUNTER — APPOINTMENT (EMERGENCY)
Dept: CT IMAGING | Facility: HOSPITAL | Age: 78
End: 2023-06-10
Payer: COMMERCIAL

## 2023-06-10 ENCOUNTER — APPOINTMENT (INPATIENT)
Dept: RADIOLOGY | Facility: HOSPITAL | Age: 78
End: 2023-06-10
Payer: COMMERCIAL

## 2023-06-10 DIAGNOSIS — F03.A4 MILD DEMENTIA WITH ANXIETY, UNSPECIFIED DEMENTIA TYPE (HCC): ICD-10-CM
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
## 2023-06-10 DIAGNOSIS — R53.1 GENERALIZED WEAKNESS: ICD-10-CM

## 2023-06-10 DIAGNOSIS — R77.8 ELEVATED TROPONIN: Primary | ICD-10-CM

## 2023-06-10 PROBLEM — R01.1 HEART MURMUR: Status: ACTIVE | Noted: 2023-06-10

## 2023-06-10 PROBLEM — R79.89 TROPONIN I ABOVE REFERENCE RANGE: Status: ACTIVE | Noted: 2023-06-10

## 2023-06-10 PROBLEM — R26.2 AMBULATORY DYSFUNCTION: Status: ACTIVE | Noted: 2023-06-10

## 2023-06-10 LAB
2HR DELTA HS TROPONIN: 10 NG/L
4HR DELTA HS TROPONIN: 3 NG/L
ANION GAP SERPL CALCULATED.3IONS-SCNC: 6 MMOL/L (ref 4–13)
BASOPHILS # BLD AUTO: 0.07 THOUSANDS/ÂΜL (ref 0–0.1)
BASOPHILS NFR BLD AUTO: 1 % (ref 0–1)
BILIRUB UR QL STRIP: NEGATIVE
BILIRUB UR QL STRIP: NEGATIVE
BNP SERPL-MCNC: 44 PG/ML (ref 0–100)
BUN SERPL-MCNC: 24 MG/DL (ref 5–25)
CALCIUM SERPL-MCNC: 9.5 MG/DL (ref 8.4–10.2)
CARDIAC TROPONIN I PNL SERPL HS: 66 NG/L
CARDIAC TROPONIN I PNL SERPL HS: 69 NG/L
CARDIAC TROPONIN I PNL SERPL HS: 76 NG/L
CHLORIDE SERPL-SCNC: 105 MMOL/L (ref 96–108)
CK SERPL-CCNC: 264 U/L (ref 39–308)
CLARITY UR: CLEAR
CLARITY UR: CLEAR
CO2 SERPL-SCNC: 25 MMOL/L (ref 21–32)
COLOR UR: NORMAL
COLOR UR: YELLOW
CREAT SERPL-MCNC: 0.85 MG/DL (ref 0.6–1.3)
CRP SERPL QL: 3.4 MG/L
EOSINOPHIL # BLD AUTO: 0.24 THOUSAND/ÂΜL (ref 0–0.61)
EOSINOPHIL NFR BLD AUTO: 3 % (ref 0–6)
ERYTHROCYTE [DISTWIDTH] IN BLOOD BY AUTOMATED COUNT: 11.5 % (ref 11.6–15.1)
GFR SERPL CREATININE-BSD FRML MDRD: 82 ML/MIN/1.73SQ M
GLUCOSE SERPL-MCNC: 93 MG/DL (ref 65–140)
GLUCOSE UR STRIP-MCNC: NEGATIVE MG/DL
GLUCOSE UR STRIP-MCNC: NEGATIVE MG/DL
HCT VFR BLD AUTO: 38.8 % (ref 36.5–49.3)
HGB BLD-MCNC: 12.8 G/DL (ref 12–17)
HGB UR QL STRIP.AUTO: NEGATIVE
HGB UR QL STRIP.AUTO: NEGATIVE
IMM GRANULOCYTES # BLD AUTO: 0.02 THOUSAND/UL (ref 0–0.2)
IMM GRANULOCYTES NFR BLD AUTO: 0 % (ref 0–2)
KETONES UR STRIP-MCNC: NEGATIVE MG/DL
KETONES UR STRIP-MCNC: NEGATIVE MG/DL
LEUKOCYTE ESTERASE UR QL STRIP: NEGATIVE
LEUKOCYTE ESTERASE UR QL STRIP: NEGATIVE
LYMPHOCYTES # BLD AUTO: 1.12 THOUSANDS/ÂΜL (ref 0.6–4.47)
LYMPHOCYTES NFR BLD AUTO: 13 % (ref 14–44)
MCH RBC QN AUTO: 34.7 PG (ref 26.8–34.3)
MCHC RBC AUTO-ENTMCNC: 33 G/DL (ref 31.4–37.4)
MCV RBC AUTO: 105 FL (ref 82–98)
MONOCYTES # BLD AUTO: 0.85 THOUSAND/ÂΜL (ref 0.17–1.22)
MONOCYTES NFR BLD AUTO: 10 % (ref 4–12)
NEUTROPHILS # BLD AUTO: 6.5 THOUSANDS/ÂΜL (ref 1.85–7.62)
NEUTS SEG NFR BLD AUTO: 73 % (ref 43–75)
NITRITE UR QL STRIP: NEGATIVE
NITRITE UR QL STRIP: NEGATIVE
NRBC BLD AUTO-RTO: 0 /100 WBCS
PH UR STRIP.AUTO: 6.5 [PH]
PH UR STRIP.AUTO: 6.5 [PH] (ref 4.5–8)
PLATELET # BLD AUTO: 301 THOUSANDS/UL (ref 149–390)
PLATELET # BLD AUTO: 302 THOUSANDS/UL (ref 149–390)
PMV BLD AUTO: 8.7 FL (ref 8.9–12.7)
PMV BLD AUTO: 9.1 FL (ref 8.9–12.7)
POTASSIUM SERPL-SCNC: 4.1 MMOL/L (ref 3.5–5.3)
PROT UR STRIP-MCNC: NEGATIVE MG/DL
PROT UR STRIP-MCNC: NEGATIVE MG/DL
RBC # BLD AUTO: 3.69 MILLION/UL (ref 3.88–5.62)
SODIUM SERPL-SCNC: 136 MMOL/L (ref 135–147)
SP GR UR STRIP.AUTO: 1.02 (ref 1–1.03)
SP GR UR STRIP.AUTO: 1.02 (ref 1–1.03)
TSH SERPL DL<=0.05 MIU/L-ACNC: 2.17 UIU/ML (ref 0.45–4.5)
UROBILINOGEN UR QL STRIP.AUTO: 0.2 E.U./DL
UROBILINOGEN UR STRIP-ACNC: <2 MG/DL
WBC # BLD AUTO: 8.8 THOUSAND/UL (ref 4.31–10.16)

## 2023-06-10 PROCEDURE — 80048 BASIC METABOLIC PNL TOTAL CA: CPT | Performed by: EMERGENCY MEDICINE

## 2023-06-10 PROCEDURE — 71045 X-RAY EXAM CHEST 1 VIEW: CPT

## 2023-06-10 PROCEDURE — 86140 C-REACTIVE PROTEIN: CPT | Performed by: INTERNAL MEDICINE

## 2023-06-10 PROCEDURE — 81003 URINALYSIS AUTO W/O SCOPE: CPT

## 2023-06-10 PROCEDURE — 93005 ELECTROCARDIOGRAM TRACING: CPT

## 2023-06-10 PROCEDURE — 84484 ASSAY OF TROPONIN QUANT: CPT | Performed by: EMERGENCY MEDICINE

## 2023-06-10 PROCEDURE — 36415 COLL VENOUS BLD VENIPUNCTURE: CPT | Performed by: EMERGENCY MEDICINE

## 2023-06-10 PROCEDURE — 83880 ASSAY OF NATRIURETIC PEPTIDE: CPT

## 2023-06-10 PROCEDURE — G1004 CDSM NDSC: HCPCS

## 2023-06-10 PROCEDURE — 82550 ASSAY OF CK (CPK): CPT | Performed by: EMERGENCY MEDICINE

## 2023-06-10 PROCEDURE — 94762 N-INVAS EAR/PLS OXIMTRY CONT: CPT

## 2023-06-10 PROCEDURE — 85025 COMPLETE CBC W/AUTO DIFF WBC: CPT | Performed by: EMERGENCY MEDICINE

## 2023-06-10 PROCEDURE — 84443 ASSAY THYROID STIM HORMONE: CPT | Performed by: INTERNAL MEDICINE

## 2023-06-10 PROCEDURE — 81003 URINALYSIS AUTO W/O SCOPE: CPT | Performed by: INTERNAL MEDICINE

## 2023-06-10 PROCEDURE — 82746 ASSAY OF FOLIC ACID SERUM: CPT | Performed by: INTERNAL MEDICINE

## 2023-06-10 PROCEDURE — 70450 CT HEAD/BRAIN W/O DYE: CPT

## 2023-06-10 PROCEDURE — 99223 1ST HOSP IP/OBS HIGH 75: CPT | Performed by: INTERNAL MEDICINE

## 2023-06-10 PROCEDURE — 85049 AUTOMATED PLATELET COUNT: CPT | Performed by: INTERNAL MEDICINE

## 2023-06-10 PROCEDURE — 82607 VITAMIN B-12: CPT | Performed by: INTERNAL MEDICINE

## 2023-06-10 PROCEDURE — 99285 EMERGENCY DEPT VISIT HI MDM: CPT

## 2023-06-10 RX ORDER — ONDANSETRON 2 MG/ML
4 INJECTION INTRAMUSCULAR; INTRAVENOUS EVERY 6 HOURS PRN
Status: CANCELLED | OUTPATIENT
Start: 2023-06-10

## 2023-06-10 RX ORDER — HEPARIN SODIUM 5000 [USP'U]/ML
5000 INJECTION, SOLUTION INTRAVENOUS; SUBCUTANEOUS EVERY 8 HOURS SCHEDULED
Status: DISCONTINUED | OUTPATIENT
Start: 2023-06-10 | End: 2023-06-13 | Stop reason: HOSPADM

## 2023-06-10 RX ORDER — LISINOPRIL 20 MG/1
20 TABLET ORAL DAILY
Status: DISCONTINUED | OUTPATIENT
Start: 2023-06-10 | End: 2023-06-13 | Stop reason: HOSPADM

## 2023-06-10 RX ORDER — HYDROCHLOROTHIAZIDE 25 MG/1
25 TABLET ORAL DAILY
Status: DISCONTINUED | OUTPATIENT
Start: 2023-06-10 | End: 2023-06-13 | Stop reason: HOSPADM

## 2023-06-10 RX ORDER — LEVOTHYROXINE SODIUM 0.07 MG/1
75 TABLET ORAL
Status: DISCONTINUED | OUTPATIENT
Start: 2023-06-11 | End: 2023-06-13 | Stop reason: HOSPADM

## 2023-06-10 RX ORDER — DOCUSATE SODIUM 100 MG/1
100 CAPSULE, LIQUID FILLED ORAL 2 TIMES DAILY
Status: DISCONTINUED | OUTPATIENT
Start: 2023-06-10 | End: 2023-06-13 | Stop reason: HOSPADM

## 2023-06-10 RX ORDER — SODIUM CHLORIDE 9 MG/ML
75 INJECTION, SOLUTION INTRAVENOUS CONTINUOUS
Status: DISCONTINUED | OUTPATIENT
Start: 2023-06-10 | End: 2023-06-11

## 2023-06-10 RX ORDER — FOLIC ACID 1 MG/1
1 TABLET ORAL DAILY
Status: DISCONTINUED | OUTPATIENT
Start: 2023-06-10 | End: 2023-06-13 | Stop reason: HOSPADM

## 2023-06-10 RX ORDER — HEPARIN SODIUM 5000 [USP'U]/ML
5000 INJECTION, SOLUTION INTRAVENOUS; SUBCUTANEOUS EVERY 8 HOURS SCHEDULED
Status: CANCELLED | OUTPATIENT
Start: 2023-06-10

## 2023-06-10 RX ORDER — AMLODIPINE BESYLATE 10 MG/1
10 TABLET ORAL
Status: DISCONTINUED | OUTPATIENT
Start: 2023-06-11 | End: 2023-06-13 | Stop reason: HOSPADM

## 2023-06-10 RX ORDER — DONEPEZIL HYDROCHLORIDE 5 MG/1
10 TABLET, FILM COATED ORAL
Status: CANCELLED | OUTPATIENT
Start: 2023-06-10

## 2023-06-10 RX ORDER — TRAZODONE HYDROCHLORIDE 50 MG/1
50 TABLET ORAL
Status: CANCELLED | OUTPATIENT
Start: 2023-06-10

## 2023-06-10 RX ORDER — SODIUM CHLORIDE 9 MG/ML
75 INJECTION, SOLUTION INTRAVENOUS CONTINUOUS
Status: CANCELLED | OUTPATIENT
Start: 2023-06-10

## 2023-06-10 RX ORDER — ALPRAZOLAM 0.5 MG/1
0.5 TABLET ORAL
Status: DISCONTINUED | OUTPATIENT
Start: 2023-06-10 | End: 2023-06-13 | Stop reason: HOSPADM

## 2023-06-10 RX ORDER — TAMSULOSIN HYDROCHLORIDE 0.4 MG/1
0.4 CAPSULE ORAL
Status: DISCONTINUED | OUTPATIENT
Start: 2023-06-10 | End: 2023-06-13 | Stop reason: HOSPADM

## 2023-06-10 RX ORDER — LANOLIN ALCOHOL/MO/W.PET/CERES
100 CREAM (GRAM) TOPICAL DAILY
Status: DISCONTINUED | OUTPATIENT
Start: 2023-06-10 | End: 2023-06-13 | Stop reason: HOSPADM

## 2023-06-10 RX ORDER — DOCUSATE SODIUM 100 MG/1
100 CAPSULE, LIQUID FILLED ORAL 2 TIMES DAILY
Status: CANCELLED | OUTPATIENT
Start: 2023-06-10

## 2023-06-10 RX ORDER — ONDANSETRON 2 MG/ML
4 INJECTION INTRAMUSCULAR; INTRAVENOUS EVERY 6 HOURS PRN
Status: DISCONTINUED | OUTPATIENT
Start: 2023-06-10 | End: 2023-06-13 | Stop reason: HOSPADM

## 2023-06-10 RX ORDER — FLUOXETINE HYDROCHLORIDE 20 MG/1
80 CAPSULE ORAL DAILY
Status: DISCONTINUED | OUTPATIENT
Start: 2023-06-10 | End: 2023-06-13 | Stop reason: HOSPADM

## 2023-06-10 RX ADMIN — HEPARIN SODIUM 5000 UNITS: 5000 INJECTION INTRAVENOUS; SUBCUTANEOUS at 21:28

## 2023-06-10 RX ADMIN — LISINOPRIL 20 MG: 20 TABLET ORAL at 13:58

## 2023-06-10 RX ADMIN — TAMSULOSIN HYDROCHLORIDE 0.4 MG: 0.4 CAPSULE ORAL at 17:03

## 2023-06-10 RX ADMIN — THIAMINE HCL TAB 100 MG 100 MG: 100 TAB at 13:59

## 2023-06-10 RX ADMIN — MULTIPLE VITAMINS W/ MINERALS TAB 1 TABLET: TAB ORAL at 13:58

## 2023-06-10 RX ADMIN — SODIUM CHLORIDE 75 ML/HR: 0.9 INJECTION, SOLUTION INTRAVENOUS at 14:07

## 2023-06-10 RX ADMIN — HEPARIN SODIUM 5000 UNITS: 5000 INJECTION INTRAVENOUS; SUBCUTANEOUS at 13:59

## 2023-06-10 RX ADMIN — FLUOXETINE 80 MG: 20 CAPSULE ORAL at 14:15

## 2023-06-10 RX ADMIN — HYDROCHLOROTHIAZIDE 25 MG: 25 TABLET ORAL at 13:58

## 2023-06-10 RX ADMIN — FOLIC ACID 1 MG: 1 TABLET ORAL at 13:58

## 2023-06-10 RX ADMIN — ALPRAZOLAM 0.5 MG: 0.5 TABLET ORAL at 21:28

## 2023-06-10 NOTE — ASSESSMENT & PLAN NOTE
"· Pt notes ambulatory dysfunction  · Has recent PT with initial improvement, but has not continued with exercises  · Ambulates without assistive device  · CT brain revealed: \"Mild progressive ventriculomegaly without evidence of obstructive hydrocephalus  Findings may be secondary to progressive central volume loss   Findings are equivocal for normal pressure hydrocephalus\"  · PT eval for balance-->if abnormal, will refer to outpt [facility]  · Will also check B12/folate/tsh  "

## 2023-06-10 NOTE — ED PROVIDER NOTES
History  Chief Complaint   Patient presents with   • Fall     Patient got oob this AM and became dizzy and fell  Was unable to get up from floor because of weakness  Denies head strike and thinners  An 49-year-old male with past medical history of BPH, anxiety, hyperlipidemia, hypothyroidism, hypertension and dementia; presents with generalized malaise  Patient states he woke up around 2am this morning to use the bathroom, and shortly after standing fell to the ground  He is unsure what caused him to fall  He denies tripping  He also denies preceding dizziness, lightheadedness, chest pain or palpitations  Patient denies striking his head and loss of consciousness, however was unable to get up  Patient reports crawling around on the floor for about 45 minutes before his son was able to assist him off the ground  Patient states he got back into bed, however on waking up this morning noted persistent weakness to his legs and was unable to ambulate  He does report a second fall, again denies head strike  Patient denies a history of similar symptoms, stating he normally has leg pain however ambulates without difficulty  He was in his usual state of health upon going to bed last evening; denying fever, chills, chest pain, shortness of breath, abdominal pain, nausea, vomiting, diarrhea, neck pain, back pain, peripheral edema and rashes  Patient does live at home with his wife and son  He does report to drinking a glass of wine every evening before bed, stating he did have a glass last night  Assessment and plan: Generalized malaise, associated with two falls  Patient reports being unable to ambulate due to lower extremity weakness, however while lying in the bed is neurologically intact  Will check lab work to evaluate for electrolyte abnormality, anemia, PAOLA and rhabdomyolysis  Will check EKG for arrhythmia  Will obtain CT head to evaluate for intracranial pathology        History provided by: Patient and medical records      Prior to Admission Medications   Prescriptions Last Dose Informant Patient Reported? Taking?    ALPRAZolam (XANAX) 0 5 mg tablet   No No   Sig: Take 1 tablet (0 5 mg total) by mouth daily at bedtime as needed for anxiety   Acetaminophen (TYLENOL PO)   Yes No   Sig: Take by mouth as needed   Dutasteride-Tamsulosin HCl 0 5-0 4 MG CAPS  Self Yes No   Sig: Take 1 capsule by mouth daily   FLUoxetine (PROzac) 40 MG capsule   No No   Sig: Take 2 capsules (80 mg total) by mouth daily   amLODIPine (NORVASC) 10 mg tablet   No No   Sig: Take 1 tablet (10 mg total) by mouth daily   butalbital-acetaminophen-caffeine (FIORICET,ESGIC) -40 mg per tablet   No No   Sig: Take 1 tablet by mouth daily as needed for headaches   donepezil (ARICEPT) 10 mg tablet Not Taking  No No   Sig: TAKE 1 TABLET DAILY AT BEDTIME   Patient not taking: Reported on 4/24/2023   levothyroxine 75 mcg tablet   No No   Sig: Take 1 tablet (75 mcg total) by mouth daily   lisinopril-hydrochlorothiazide (PRINZIDE,ZESTORETIC) 20-25 MG per tablet   No No   Sig: TAKE 1 TABLET DAILY   niacin 500 mg tablet  Self Yes No   Sig: Take 500 mg by mouth daily with breakfast   traZODone (DESYREL) 50 mg tablet   No No   Sig: Take 1 tablet (50 mg total) by mouth daily at bedtime      Facility-Administered Medications: None       Past Medical History:   Diagnosis Date   • Abnormal weight loss    • Anxiety    • BPH (benign prostatic hyperplasia)    • Bursitis of left hip    • Cervical radiculopathy    • Colon polyps    • Dementia (HCC)    • Depression    • Disease of thyroid gland    • Heart murmur    • Hypertension    • Nicotine dependence in remission    • Non-toxic multinodular goiter    • Psychiatric disorder        Past Surgical History:   Procedure Laterality Date   • Zen[place]ia Dus Left     Dr Flaco Christie; onset: 8/6/13   • COLONOSCOPY      3/20/13, normal clon - Dr Pradip Cabral, repeat in 5-10 years   • HIP SURGERY     • OK COLONOSCOPY FLX DX W/COLLJ SPEC WHEN PFRMD N/A 3/28/2018    Procedure: COLONOSCOPY;  Surgeon: Fifi Caruso MD;  Location: [facility]; Service: Colorectal   • THYROIDECTOMY, PARTIAL     • TONSILLECTOMY     • TOTAL HIP ARTHROPLASTY         Family History   Problem Relation Age of Onset   • Diabetes Father    • Lung cancer Father    • Alcohol abuse Paternal Uncle    • Heart disease Paternal Uncle      I have reviewed and agree with the history as documented  E-Cigarette/Vaping     E-Cigarette/Vaping Substances     Social History     Tobacco Use   • Smoking status: Former   • Smokeless tobacco: Never   Substance Use Topics   • Alcohol use: No     Comment: social use as per [employer]   • Drug use: No       Review of Systems   Neurological: Positive for weakness (generalized)  All other systems reviewed and are negative  Physical Exam  Physical Exam  General Appearance: alert and oriented, nad, non toxic appearing  Skin:  Warm, dry, intact  No cyanosis  HEENT: Atraumatic, normocephalic  No eye drainage  Normal hearing  Moist mucous membranes  Neck: Supple, trachea midline  No midline cervical spine tenderness  Cardiac: RRR; no murmurs, rub, gallops  No pedal edema, 2+ pulses  Pulmonary: lungs CTAB; no wheezes, rales, rhonchi  Gastrointestinal: abdomen soft, nontender, nondistended; no guarding or rebound tenderness; good bowel sounds, no mass or bruits  Extremities:  No deformities  No calf tenderness, no clubbing  Neuro:  CN 2-12 grossly intact  5/5 motor strength to the bilateral upper and lower extremities  Sensation intact throughout  No pronator drift  Normal finger-to-nose to the bilateral upper extremities, normal heel-to-shin to the bilateral lower extremities  Psych:  Normal mood, flat affect    Normal judgement and insight      Vital Signs  ED Triage Vitals   Temperature Pulse Respirations Blood Pressure SpO2   06/10/23 1007 06/10/23 0915 06/10/23 0915 06/10/23 0915 06/10/23 0915 97 5 °F (36 4 °C) 80 20 156/77 98 %      Temp Source Heart Rate Source Patient Position - Orthostatic VS BP Location FiO2 (%)   06/10/23 1007 06/10/23 0915 06/10/23 0915 06/10/23 0915 --   Oral Monitor Lying Left arm       Pain Score       --                  Vitals:    06/10/23 1000 06/10/23 1100 06/10/23 1115 06/10/23 1130   BP: 155/70 153/70 149/70 150/67   Pulse: 66 68 67 67   Patient Position - Orthostatic VS: Lying Lying Lying Lying         Visual Acuity      ED Medications  Medications - No data to display    Diagnostic Studies  Results Reviewed     Procedure Component Value Units Date/Time    HS Troponin I 4hr [[id number]]     Lab Status: No result Specimen: Blood     HS Troponin 0hr (reflex protocol) [[id number]]  (Abnormal) Collected: 06/10/23 0926    Lab Status: Final result Specimen: Blood from Hand, Left Updated: 06/10/23 0959     hs TnI 0hr 66 ng/L     HS Troponin I 2hr [[id number]]     Lab Status: No result Specimen: Blood     Basic metabolic panel [[id number]] Collected: 06/10/23 0926    Lab Status: Final result Specimen: Blood from Hand, Left Updated: 06/10/23 0948     Sodium 136 mmol/L      Potassium 4 1 mmol/L      Chloride 105 mmol/L      CO2 25 mmol/L      ANION GAP 6 mmol/L      BUN 24 mg/dL      Creatinine 0 85 mg/dL      Glucose 93 mg/dL      Calcium 9 5 mg/dL      eGFR 82 ml/min/1 73sq m     Narrative:      Bill guidelines for Chronic Kidney Disease (CKD):   •  Stage 1 with normal or high GFR (GFR > 90 mL/min/1 73 square meters)  •  Stage 2 Mild CKD (GFR = 60-89 mL/min/1 73 square meters)  •  Stage 3A Moderate CKD (GFR = 45-59 mL/min/1 73 square meters)  •  Stage 3B Moderate CKD (GFR = 30-44 mL/min/1 73 square meters)  •  Stage 4 Severe CKD (GFR = 15-29 mL/min/1 73 square meters)  •  Stage 5 End Stage CKD (GFR <15 mL/min/1 73 square meters)  Note: GFR calculation is accurate only with a steady state creatinine    CK [[id number]]  (Normal) Collected: 06/10/23 5854 Lab Status: Final result Specimen: Blood from Hand, Left Updated: 06/10/23 0948     Total  U/L     CBC and differential [[id number]]  (Abnormal) Collected: 06/10/23 0926    Lab Status: Final result Specimen: Blood from Hand, Left Updated: 06/10/23 0940     WBC 8 80 Thousand/uL      RBC 3 69 Million/uL      Hemoglobin 12 8 g/dL      Hematocrit 38 8 %       fL      MCH 34 7 pg      MCHC 33 0 g/dL      RDW 11 5 %      MPV 9 1 fL      Platelets 688 Thousands/uL      nRBC 0 /100 WBCs      Neutrophils Relative 73 %      Immat GRANS % 0 %      Lymphocytes Relative 13 %      Monocytes Relative 10 %      Eosinophils Relative 3 %      Basophils Relative 1 %      Neutrophils Absolute 6 50 Thousands/µL      Immature Grans Absolute 0 02 Thousand/uL      Lymphocytes Absolute 1 12 Thousands/µL      Monocytes Absolute 0 85 Thousand/µL      Eosinophils Absolute 0 24 Thousand/µL      Basophils Absolute 0 07 Thousands/µL                  CT head without contrast   Final Result by Kelli Gunderson MD (06/10 1036)      No acute intracranial abnormality  Mild progressive ventriculomegaly without evidence of obstructive hydrocephalus  Findings may be secondary to progressive central volume loss  Findings are equivocal for normal pressure hydrocephalus  Workstation performed: [id number]                    Procedures  Procedures   ECG 12 Lead Documentation  Date/Time: today/date: 6/10/2023  Performed by: Kiet Gamez    ECG reviewed by me, the ED Provider: yes    Patient location:  ED   Previous ECG:  No old for comparison   Rate:  82  ECG rate assessment: normal    Rhythm: sinus rhythm    Ectopy:  none    QRS axis:  Normal  Intervals: normal   Q waves: None   ST segments:  Normal  T waves: normal      Impression: Normal EKG          ED Course  ED Course as of 06/10/23 1142   Sat [place] 10, 2023   1004 hs TnI 0hr(!): 66  Pt without CP  EKG without ST changes  Unclear etiology, possible arrhythmia preceding fall  Will continue to trend   1046 CT head without contrast  1 ) No acute intracranial abnormality  2 ) Mild progressive ventriculomegaly without evidence of obstructive hydrocephalus  Findings may be secondary to progressive central volume loss  Findings are equivocal for normal pressure hydrocephalus  Given acute onset of symptoms today, doubt normal pressure hydrocephalus   1103 Pt's wife now at bedside  Pt and wife updated on lab results and plan for admission  They are in agreement  Pt's wife reports that prior to bed each night pt takes xanax, tylenol PM and 12 oz of wine - discussed that this combination may be the etiology of pt's symptoms overnight, recommending he decrease use especially the benadryl and alcohol  1 Spoke with SLIM, discussed pt's history, presentation and work up  Will accept in admission  HEART Risk Score    Flowsheet Row Most Recent Value   Heart Score Risk Calculator    History 0 Filed at: 06/10/2023 1103   ECG 0 Filed at: 06/10/2023 1103   Age 2 Filed at: 06/10/2023 1103   Risk Factors 1 Filed at: 06/10/2023 1103   Troponin 2 Filed at: 06/10/2023 1103   HEART Score 5 Filed at: 06/10/2023 1103                        SBIRT 20yo+    Flowsheet Row Most Recent Value   Initial Alcohol Screen: US AUDIT-C     1  How often do you have a drink containing alcohol? 0 Filed at: 06/10/2023 0903   2  How many drinks containing alcohol do you have on a typical day you are drinking? 0 Filed at: 06/10/2023 0903   3b  FEMALE Any Age, or MALE 65+: How often do you have 4 or more drinks on one occassion? 0 Filed at: 06/10/2023 0903   Audit-C Score 0 Filed at: 06/10/2023 8739   JIMENEZ: How many times in the past year have you    Used an illegal drug or used a prescription medication for non-medical reasons? Never Filed at: 06/10/2023 8963                    Medical Decision Making  Amount and/or Complexity of Data Reviewed  Labs: ordered   Decision-making details documented in ED Course  Radiology: ordered  Decision-making details documented in ED Course  Risk  Decision regarding hospitalization  Disposition  Final diagnoses:   Elevated troponin   Generalized weakness   Fall, initial encounter     Time reflects when diagnosis was documented in both MDM as applicable and the Disposition within this note     Time User Action Codes Description Comment    6/10/2023 11:21 AM Josette Cohen Add [R77 8] Elevated troponin     6/10/2023 11:21 AM Akua Cohen Add [R53 1] Generalized weakness     6/10/2023 11:21 AM Dez Gama Add [W19  [place], initial encounter       ED Disposition     ED Disposition   Admit    Condition   Stable    Date/Time   Sat [place] 10, 2023 11:21 AM    Comment   Case was discussed with MARIBELL and the patient's admission status was agreed to be Admission Status: inpatient status to the service of Dr Ilene Herrera  Follow-up Information    None         Patient's Medications   Discharge Prescriptions    No medications on file       No discharge procedures on file      PDMP Review       Value Time User    PDMP Reviewed  Yes 4/24/2023  4:12 PM Christopher Roblero MD          ED Provider  Electronically Signed by           Tierra Garner DO  06/10/23 1142

## 2023-06-10 NOTE — ASSESSMENT & PLAN NOTE
Patient is an 49-year-old male with past medical history significant for hypertension, hypothyroidism, mild dementia who presents to the ER for evaluation of a fall  · Patient's wife notes this is his second fall in the last few months  · Patient notes he awakened during the night to use the restroom, he stand up, began ambulating in his legs gave out  He notes generalized weakness in both legs  He fell  He was unable to get up, so crawled to his wife's room to get help  · He notes at baseline he has an unsteady gait, but does not ambulate with a walker  · Patient denies any current pain anywhere after the fall: He notes at baseline he has chronic pain involving the entirety of both of his legs  · Patient denies any preceding dizziness, lightheadedness, chest pain, palpitations, or syncope  He notes he fell because his legs gave out  · Patient had a CT scan of his brain without any acute abnormalities  Noted to have abrasions on both knees from crawling however no other signs or symptoms of trauma    Full range of motion of extremities without pain  · consult PT/OT  · Fall precautions

## 2023-06-10 NOTE — ASSESSMENT & PLAN NOTE
· Patient has essential hypertension  · Continue home medications with Norvasc 10 mg: Which he takes at at bedtime,, and lisinopril-HCTZ 20-25 mg he takes in the morning

## 2023-06-10 NOTE — ASSESSMENT & PLAN NOTE
Patient has history of BPH and follows with urology  Continue Flomax  Denies any current urinary symptoms

## 2023-06-10 NOTE — ASSESSMENT & PLAN NOTE
Patient was recently diagnosed with heart murmur as an outpatient and echocardiogram ordered  follow-up echocardiogram as an inpatient, due to fall, and positive troponin  We will evaluate for aortic stenosis: However patient denies any syncope or symptoms concerning for symptomatic severe aortic stenosis

## 2023-06-10 NOTE — ASSESSMENT & PLAN NOTE
· Patient was seen by neurology in the past and diagnosed with dementia  · He was started on Aricept 10 mg daily which his wife discontinued approximately 1 month ago due to concerns of a personality change  · Patient's home nocturnal regimen of Xanax 0 5 mg, a large glass of wine, Tylenol arthritis plus Tylenol PM is not optimal in the geriatric population  · He also notes worsening gait and ambulatory dysfunction  · Confer with geriatrics team  · Check B12/TSH/folate/esr

## 2023-06-10 NOTE — H&P
[address]  H&P  Name: Dami Lea [de-identified] y o  male I MRN: [id number]  Unit/Bed#: ED-15 I Date of Admission: 6/10/2023   Date of Service: 6/10/2023 I Hospital Day: 0      Assessment/Plan   * Fall  Assessment & Plan  Patient is an 77-year-old male with past medical history significant for hypertension, hypothyroidism, mild dementia who presents to the ER for evaluation of a fall  · Patient's wife notes this is his second fall in the last few months  · Patient notes he awakened during the night to use the restroom, he stand up, began ambulating in his legs gave out  He notes generalized weakness in both legs  He fell  He was unable to get up, so crawled to his wife's room to get help  · He notes at baseline he has an unsteady gait, but does not ambulate with a walker  · Patient denies any current pain anywhere after the fall: He notes at baseline he has chronic pain involving the entirety of both of his legs  · Patient denies any preceding dizziness, lightheadedness, chest pain, palpitations, or syncope  He notes he fell because his legs gave out  · Patient had a CT scan of his brain without any acute abnormalities  Noted to have abrasions on both knees from crawling however no other signs or symptoms of trauma  Full range of motion of extremities without pain  · consult PT/OT  · Fall precautions    Troponin I above reference range  Assessment & Plan  · Patient presented with a positive troponin of 66  · Initial EKG with T wave inversion in aVL: No previous EKG available for comparison  · He denies any cardiac symptoms  · Check serial cardiac enzymes and EKGs      Mild dementia ([facility] [address] )  Assessment & Plan  · Patient was seen by neurology in the past and diagnosed with dementia  · He was started on Aricept 10 mg daily which his wife discontinued approximately 1 month ago due to concerns of a personality change  · Patient's home nocturnal regimen of Xanax 0 5 mg, a large glass of "wine, Tylenol arthritis plus Tylenol PM is not optimal in the geriatric population  · He also notes worsening gait and ambulatory dysfunction  · Confer with geriatrics team  · Check B12/TSH/folate/esr    Ambulatory dysfunction  Assessment & Plan  · Pt notes ambulatory dysfunction  · Has recent PT with initial improvement, but has not continued with exercises  · Ambulates without assistive device  · CT brain revealed: \"Mild progressive ventriculomegaly without evidence of obstructive hydrocephalus  Findings may be secondary to progressive central volume loss  Findings are equivocal for normal pressure hydrocephalus\"  · PT eval for balance-->if abnormal, will refer to outpt [facility]  · Will also check B12/folate/tsh    Heart murmur  Assessment & Plan  Patient was recently diagnosed with heart murmur as an outpatient and echocardiogram ordered  follow-up echocardiogram as an inpatient, due to fall, and positive troponin  We will evaluate for aortic stenosis: However patient denies any syncope or symptoms concerning for symptomatic severe aortic stenosis    Essential hypertension  Assessment & Plan  · Patient has essential hypertension  · Continue home medications with Norvasc 10 mg: Which he takes at at bedtime,, and lisinopril-HCTZ 20-25 mg he takes in the morning    Hypothyroidism  Assessment & Plan  Continue Synthroid    Benign prostatic hyperplasia  Assessment & Plan  Patient has history of BPH and follows with urology  Continue Flomax  Denies any current urinary symptoms    Anxiety  Assessment & Plan  · Patient has a history of anxiety  · Is prescribed Prozac 80 mg daily as well as Xanax 0 5 mg at bedtime  · PDMP website was queried, no red flags       Alcohol use: Patient drinks a very large glass of wine nightly  Will start thiamine, folic acid, multivitamin  Patient counseled for moderation/cessation    Also prescribed Xanax nightly, not optimal medication in the geriatric population, however due to " concerns over withdrawal, as this is a longstanding medication, confirmed in the PDMP, will continue medication to avoid benzodiazepine withdrawal  ==========================================    History of Present Illness     HPI:  Queen Mariely is a [de-identified] y o  male who presents for evaluation after a fall  History is currently taken from patient and his wife at the bedside  Patient's wife relates that patient has a nocturnal schedule  He usually stays in his recliner till late at night, reading, he has a very less large glass of wine each night, takes a Xanax, takes a Tylenol PM, and a Tylenol arthritis  Several months ago he had a fall when he was ambulating out of the living room, attempted to go up the stairs, lost his balance, and fell  At that time he was unable to get himself up, and his wife and son had to assist him, which took quite some time  Patient suffered a fall last night  He was in his room, awakened and had to use the restroom  He began ambulating to the restroom, felt both of his legs give out and fell  He denies any trauma from the fall  Patient notes he had generalized weakness and was unable to get up  Patient crawled to his wife's room and was able to knock on the door to open alert her  He was brought to the ER for further evaluation  Patient denies any pain anywhere new since the fall  He notes at baseline he has pain in both of his legs from the toes up into the hips  He notes is usually in the muscles  He also notes unsteady gait, and generalized weakness  He denies any focal weakness  No headache  No neck pain  He denies any new pain in his extremities after the fall  Since presenting states he did not have any preceding symptoms prior to the fall such as dizziness, lightheadedness, chest pain, palpitations  He did not syncopized  He denies any current chest pain  Denies any shortness of breath  Denies any chest or back pain      Denies any recent infection: Denies any fevers or chills  Denies any chest congestion or phlegm  He denies any abdominal pain, nausea, vomiting, diarrhea  Denies any difficulty passing his urine        Historical Information   Past Medical History:   Diagnosis Date   • Abnormal weight loss    • Anxiety    • BPH (benign prostatic hyperplasia)    • Bursitis of left hip    • Cervical radiculopathy    • Colon polyps    • Dementia (HCC)    • Depression    • Disease of thyroid gland    • Heart murmur    • Hypertension    • Nicotine dependence in remission    • Non-toxic multinodular goiter    • Psychiatric disorder      Patient Active Problem List   Diagnosis   • Marital dysfunction   • Anxiety   • Benign prostatic hyperplasia   • Cataract   • Erectile dysfunction of non-organic origin   • Hearing loss   • Hyperlipidemia   • Hypothyroidism   • Impaired fasting glucose   • Macrocytosis   • Memory loss   • Osteoarthritis   • Primary localized osteoarthrosis of the hip   • Tension type headache   • PROVISIONAL Obsessive compulsive personality disorder   • BMI 25 0-25 9,adult   • Chronic intractable headache   • Mild dementia (HCC)   • Essential hypertension   • Alcohol use   • Osteochondroma of femur   • Primary localized osteoarthritis of pelvic region and thigh   • Insomnia   • Fall   • Heart murmur   • Troponin I above reference range   • Ambulatory dysfunction     Past Surgical History:   Procedure Laterality Date   • CATARACT EXTRACTION Left     Dr Cele Thakur; onset: 8/6/13   • COLONOSCOPY      3/20/13, normal clon - Dr Carlos Manuel Cassidy, repeat in 5-10 years   • HIP SURGERY     • CO COLONOSCOPY FLX DX W/COLLJ [facility] WHEN PFRMD N/A 3/28/2018    Procedure: COLONOSCOPY;  Surgeon: Rob Blankenship MD;  Location: [facility];   Service: Colorectal   • THYROIDECTOMY, PARTIAL     • TONSILLECTOMY     • TOTAL HIP ARTHROPLASTY         Social History   Social History     Substance and Sexual Activity   Alcohol Use No    Comment: social use as per [employer]     Social History     Substance and Sexual Activity   Drug Use No     Social History     Tobacco Use   Smoking Status Former   Smokeless Tobacco Never       Family History:   Family History   Problem Relation Age of Onset   • Diabetes Father    • Lung cancer Father    • Alcohol abuse Paternal Uncle    • Heart disease Paternal Uncle        Meds/Allergies     No current facility-administered medications for this encounter  Current Outpatient Medications:   •  Acetaminophen (TYLENOL PO), Take by mouth as needed, Disp: , Rfl:   •  ALPRAZolam (XANAX) 0 5 mg tablet, Take 1 tablet (0 5 mg total) by mouth daily at bedtime as needed for anxiety, Disp: 90 tablet, Rfl: 0  •  amLODIPine (NORVASC) 10 mg tablet, Take 1 tablet (10 mg total) by mouth daily, Disp: 90 tablet, Rfl: 1  •  butalbital-acetaminophen-caffeine (FIORICET,ESGIC) -40 mg per tablet, Take 1 tablet by mouth daily as needed for headaches, Disp: 90 tablet, Rfl: 0  •  donepezil (ARICEPT) 10 mg tablet, TAKE 1 TABLET DAILY AT BEDTIME (Patient not taking: Reported on 4/24/2023), Disp: 90 tablet, Rfl: 3  •  Dutasteride-Tamsulosin HCl 0 5-0 4 MG CAPS, Take 1 capsule by mouth daily, Disp: , Rfl:   •  FLUoxetine (PROzac) 40 MG capsule, Take 2 capsules (80 mg total) by mouth daily, Disp: 180 capsule, Rfl: 3  •  levothyroxine 75 mcg tablet, Take 1 tablet (75 mcg total) by mouth daily, Disp: 90 tablet, Rfl: 3  •  lisinopril-hydrochlorothiazide (PRINZIDE,ZESTORETIC) 20-25 MG per tablet, TAKE 1 TABLET DAILY, Disp: 90 tablet, Rfl: 3  •  niacin 500 mg tablet, Take 500 mg by mouth daily with breakfast, Disp: , Rfl:   •  traZODone (DESYREL) 50 mg tablet, Take 1 tablet (50 mg total) by mouth daily at bedtime, Disp: 30 tablet, Rfl: 0    Allergies   Allergen Reactions   • Aspirin      Other reaction(s): Blood Disorders, rectal bleeding/friable prostate  Category: Adverse Reaction;         Review of Systems  A detailed 12 point review of systems was conducted and is negative apart from those mentioned in the HPI  Objective   Vitals: Blood pressure 150/67, pulse 67, temperature 97 5 °F (36 4 °C), temperature source Oral, resp  rate 17, SpO2 98 %  Physical Exam   General: Very pleasant male  No acute distress  Wife seated at the bedside  Nontachypneic  Able to speak in complete sentences without a stop for dyspnea   HEENT: EOMI, sclera anicteric, dry mucous membranes  Neck: supple,   Heart: Regular rate and rhythm, S1S2 present  No murmur, rub or gallop  Lungs; Clear to auscultation bilaterally  No wheezing, crackles or rhonchi  No accessory muscle use or respiratory distress  Abdomen: soft, non-tender, non-distended, NABS  No guarding or rebound  No peritoneal sound or mass  Extremities: no clubbing, cyanosis, or edema  2+ pedal pulses bilaterally  Neurologic: Cranial nerves: Muscles of facial expression intact  No facial droop  Fluent speech  Smile symmetrical   Eyebrows raise symmetrically  Strength: Bilateral ankle flexion 5/5, bilateral knee extension 5/5, bilateral hip flexion 5/5  Skin: warm and dry  No petechiae, purpura or rash + Faint abrasion noted on both knees without open areas  Lab Results:   Results from last 7 days   Lab Units 06/10/23  0926   HEMATOCRIT % 38 8   HEMOGLOBIN g/dL 12 8   PLATELETS Thousands/uL 302   WBC Thousand/uL 8 80     Results from last 7 days   Lab Units 06/10/23  0926   BUN mg/dL 24   CALCIUM mg/dL 9 5   CHLORIDE mmol/L 105   CO2 mmol/L 25   CREATININE mg/dL 0 85   POTASSIUM mmol/L 4 1         Imaging:  CT brain  Mild progressive ventriculomegaly without evidence of obstructive hydrocephalus  Findings may be secondary to progressive central volume loss  Findings are equivocal for normal pressure hydrocephalus:    EKG: Normal sinus rhythm  T wave inversion in aVF        Code Status: Full code            Portions of the record may have been created with voice recognition software       Disposition: Due to patient's fall, and positive troponin he will be admitted to the hospital   It is anticipated that his length of stay will be greater than 2 midnights and he will be admitted to inpatient status  Family: Updated patient and his wife at the bedside    Reviewed all test results and treatment plan

## 2023-06-10 NOTE — ASSESSMENT & PLAN NOTE
· Patient presented with a positive troponin of 66  · Initial EKG with T wave inversion in aVL: No previous EKG available for comparison  · He denies any cardiac symptoms  · Check serial cardiac enzymes and EKGs

## 2023-06-10 NOTE — ASSESSMENT & PLAN NOTE
· Patient has a history of anxiety  · Is prescribed Prozac 80 mg daily as well as Xanax 0 5 mg at bedtime  · PDMP website was queried, no red flags

## 2023-06-10 NOTE — ASSESSMENT & PLAN NOTE
"· Patient drinks a very large glass of wine nightly  · We will start thiamine, folic acid, multivitamin  · We will monitor on CIWA scale  · Patient is also prescribed concurrent benzodiazepine with Xanax  Wife is concerned about patient drinking wine and taking xanax every night before bed   States he \"too stubborn\" to make lifestyle changes   "

## 2023-06-11 ENCOUNTER — APPOINTMENT (INPATIENT)
Dept: NON INVASIVE DIAGNOSTICS | Facility: HOSPITAL | Age: 78
End: 2023-06-11
Payer: COMMERCIAL

## 2023-06-11 LAB
ANION GAP SERPL CALCULATED.3IONS-SCNC: 3 MMOL/L (ref 4–13)
AORTIC ROOT: 3.4 CM
AORTIC VALVE MEAN VELOCITY: 19.3 M/S
APICAL FOUR CHAMBER EJECTION FRACTION: 62 %
ATRIAL RATE: 64 BPM
ATRIAL RATE: 67 BPM
ATRIAL RATE: 82 BPM
AV AREA BY CONTINUOUS VTI: 1.6 CM2
AV AREA PEAK VELOCITY: 1.3 CM2
AV LVOT MEAN GRADIENT: 3 MMHG
AV LVOT PEAK GRADIENT: 5 MMHG
AV MEAN GRADIENT: 17 MMHG
AV PEAK GRADIENT: 31 MMHG
AV REGURGITATION PRESSURE HALF TIME: 535 MS
AV VALVE AREA: 1.59 CM2
AV VELOCITY RATIO: 0.4
BUN SERPL-MCNC: 16 MG/DL (ref 5–25)
CALCIUM SERPL-MCNC: 8.7 MG/DL (ref 8.4–10.2)
CHLORIDE SERPL-SCNC: 107 MMOL/L (ref 96–108)
CO2 SERPL-SCNC: 28 MMOL/L (ref 21–32)
CREAT SERPL-MCNC: 0.85 MG/DL (ref 0.6–1.3)
DOP CALC AO PEAK VEL: 2.76 M/S
DOP CALC AO VTI: 58.34 CM
DOP CALC LVOT AREA: 3.8 CM2
DOP CALC LVOT DIAMETER: 2.2 CM
DOP CALC LVOT PEAK VEL VTI: 24.41 CM
DOP CALC LVOT PEAK VEL: 1.11 M/S
DOP CALC LVOT STROKE INDEX: 50 ML/M2
DOP CALC LVOT STROKE VOLUME: 92.74 CM3
E WAVE DECELERATION TIME: 279 MS
ERYTHROCYTE [DISTWIDTH] IN BLOOD BY AUTOMATED COUNT: 11.5 % (ref 11.6–15.1)
FOLATE SERPL-MCNC: >22.3 NG/ML
FRACTIONAL SHORTENING: 30 % (ref 28–44)
GFR SERPL CREATININE-BSD FRML MDRD: 82 ML/MIN/1.73SQ M
GLUCOSE SERPL-MCNC: 95 MG/DL (ref 65–140)
HCT VFR BLD AUTO: 37.2 % (ref 36.5–49.3)
HGB BLD-MCNC: 12.3 G/DL (ref 12–17)
INTERVENTRICULAR SEPTUM IN DIASTOLE (PARASTERNAL SHORT AXIS VIEW): 1.1 CM
INTERVENTRICULAR SEPTUM: 1.1 CM (ref 0.6–1.1)
LAAS-AP2: 16.8 CM2
LAAS-AP4: 10.6 CM2
LEFT ATRIUM AREA SYSTOLE SINGLE PLANE A4C: 9.5 CM2
LEFT ATRIUM SIZE: 2.8 CM
LEFT INTERNAL DIMENSION IN SYSTOLE: 2.8 CM (ref 2.1–4)
LEFT VENTRICULAR INTERNAL DIMENSION IN DIASTOLE: 4 CM (ref 3.5–6)
LEFT VENTRICULAR POSTERIOR WALL IN END DIASTOLE: 1.1 CM
LEFT VENTRICULAR STROKE VOLUME: 40 ML
LVSV (TEICH): 40 ML
MCH RBC QN AUTO: 34.9 PG (ref 26.8–34.3)
MCHC RBC AUTO-ENTMCNC: 33.1 G/DL (ref 31.4–37.4)
MCV RBC AUTO: 106 FL (ref 82–98)
MV E'TISSUE VEL-SEP: 7 CM/S
MV PEAK A VEL: 1.23 M/S
MV PEAK E VEL: 54 CM/S
MV STENOSIS PRESSURE HALF TIME: 81 MS
MV VALVE AREA P 1/2 METHOD: 2.72 CM2
P AXIS: 52 DEGREES
P AXIS: 60 DEGREES
P AXIS: 65 DEGREES
PLATELET # BLD AUTO: 279 THOUSANDS/UL (ref 149–390)
PMV BLD AUTO: 8.8 FL (ref 8.9–12.7)
POTASSIUM SERPL-SCNC: 3.9 MMOL/L (ref 3.5–5.3)
PR INTERVAL: 200 MS
PR INTERVAL: 206 MS
PR INTERVAL: 218 MS
QRS AXIS: -58 DEGREES
QRS AXIS: -58 DEGREES
QRS AXIS: -63 DEGREES
QRSD INTERVAL: 80 MS
QRSD INTERVAL: 80 MS
QRSD INTERVAL: 88 MS
QT INTERVAL: 344 MS
QT INTERVAL: 382 MS
QT INTERVAL: 396 MS
QTC INTERVAL: 401 MS
QTC INTERVAL: 403 MS
QTC INTERVAL: 408 MS
RBC # BLD AUTO: 3.52 MILLION/UL (ref 3.88–5.62)
RIGHT ATRIUM AREA SYSTOLE A4C: 4.4 CM2
RIGHT VENTRICLE ID DIMENSION: 3.1 CM
SL CV AV DECELERATION TIME RETROGRADE: 1844 MS
SL CV AV PEAK GRADIENT RETROGRADE: 70 MMHG
SL CV LEFT ATRIUM LENGTH A2C: 5.6 CM
SL CV PED ECHO LEFT VENTRICLE DIASTOLIC VOLUME (MOD BIPLANE) 2D: 71 ML
SL CV PED ECHO LEFT VENTRICLE SYSTOLIC VOLUME (MOD BIPLANE) 2D: 31 ML
SODIUM SERPL-SCNC: 138 MMOL/L (ref 135–147)
T WAVE AXIS: 61 DEGREES
T WAVE AXIS: 67 DEGREES
T WAVE AXIS: 70 DEGREES
TR MAX PG: 46 MMHG
TR PEAK VELOCITY: 3.4 M/S
TRICUSPID ANNULAR PLANE SYSTOLIC EXCURSION: 1.8 CM
TRICUSPID VALVE PEAK REGURGITATION VELOCITY: 3.38 M/S
VENTRICULAR RATE: 64 BPM
VENTRICULAR RATE: 67 BPM
VENTRICULAR RATE: 82 BPM
VIT B12 SERPL-MCNC: 941 PG/ML (ref 180–914)
WBC # BLD AUTO: 5.85 THOUSAND/UL (ref 4.31–10.16)

## 2023-06-11 PROCEDURE — 80048 BASIC METABOLIC PNL TOTAL CA: CPT | Performed by: INTERNAL MEDICINE

## 2023-06-11 PROCEDURE — 85027 COMPLETE CBC AUTOMATED: CPT | Performed by: INTERNAL MEDICINE

## 2023-06-11 PROCEDURE — 93306 TTE W/DOPPLER COMPLETE: CPT

## 2023-06-11 PROCEDURE — 93010 ELECTROCARDIOGRAM REPORT: CPT

## 2023-06-11 PROCEDURE — 99233 SBSQ HOSP IP/OBS HIGH 50: CPT | Performed by: PHYSICIAN ASSISTANT

## 2023-06-11 PROCEDURE — 93306 TTE W/DOPPLER COMPLETE: CPT | Performed by: INTERNAL MEDICINE

## 2023-06-11 PROCEDURE — 94762 N-INVAS EAR/PLS OXIMTRY CONT: CPT

## 2023-06-11 RX ADMIN — FLUOXETINE 80 MG: 20 CAPSULE ORAL at 09:51

## 2023-06-11 RX ADMIN — MULTIPLE VITAMINS W/ MINERALS TAB 1 TABLET: TAB ORAL at 09:51

## 2023-06-11 RX ADMIN — FOLIC ACID 1 MG: 1 TABLET ORAL at 09:51

## 2023-06-11 RX ADMIN — THIAMINE HCL TAB 100 MG 100 MG: 100 TAB at 09:51

## 2023-06-11 RX ADMIN — SODIUM CHLORIDE 75 ML/HR: 0.9 INJECTION, SOLUTION INTRAVENOUS at 02:27

## 2023-06-11 RX ADMIN — LISINOPRIL 20 MG: 20 TABLET ORAL at 09:51

## 2023-06-11 RX ADMIN — HEPARIN SODIUM 5000 UNITS: 5000 INJECTION INTRAVENOUS; SUBCUTANEOUS at 21:24

## 2023-06-11 RX ADMIN — HEPARIN SODIUM 5000 UNITS: 5000 INJECTION INTRAVENOUS; SUBCUTANEOUS at 05:59

## 2023-06-11 RX ADMIN — HYDROCHLOROTHIAZIDE 25 MG: 25 TABLET ORAL at 09:51

## 2023-06-11 RX ADMIN — LEVOTHYROXINE SODIUM 75 MCG: 75 TABLET ORAL at 05:59

## 2023-06-11 RX ADMIN — ALPRAZOLAM 0.5 MG: 0.5 TABLET ORAL at 21:27

## 2023-06-11 RX ADMIN — TAMSULOSIN HYDROCHLORIDE 0.4 MG: 0.4 CAPSULE ORAL at 16:51

## 2023-06-11 RX ADMIN — AMLODIPINE BESYLATE 10 MG: 10 TABLET ORAL at 21:24

## 2023-06-11 NOTE — ASSESSMENT & PLAN NOTE
· Patient presented with a positive troponin of 66 to 76 to 69  · Initial EKG with T wave inversion in aVL: No previous EKG available for comparison  · He denies any cardiac symptoms  Continue to monitor  ECHO pending

## 2023-06-11 NOTE — QUICK NOTE
Overnight: 06/10/23:    Paged overnight regarding hypoxia requiring 2LNC  Patient examined without acute complaints  Lungs sound clear/equal bilaterally, without intercostal muscle use  Portable CXR obtained which appears unremarkable  Will add incentive spirometry

## 2023-06-11 NOTE — PLAN OF CARE
Problem: Potential for Falls  Goal: Patient will remain free of falls  Description: INTERVENTIONS:  - Educate patient/family on patient safety including physical limitations  - Instruct patient to call for assistance with activity   - Consult OT/PT to assist with strengthening/mobility   - Keep Call bell within reach  - Keep bed low and locked with side rails adjusted as appropriate  - Keep care items and personal belongings within reach  - Initiate and maintain comfort rounds  - Make Fall Risk Sign visible to staff  - Offer Toileting every 2 Hours, in advance of need  - Initiate/Maintain bed alarm  - Obtain necessary fall risk management equipment: alarms  - Apply yellow socks and bracelet for high fall risk patients  - Consider moving patient to room near nurses station  Outcome: Progressing     Problem: MOBILITY - ADULT  Goal: Maintain or return to baseline ADL function  Description: INTERVENTIONS:  -  Assess patient's ability to carry out ADLs; assess patient's baseline for ADL function and identify physical deficits which impact ability to perform ADLs (bathing, care of mouth/teeth, toileting, grooming, dressing, etc )  - Assess/evaluate cause of self-care deficits   - Assess range of motion  - Assess patient's mobility; develop plan if impaired  - Assess patient's need for assistive devices and provide as appropriate  - Encourage maximum independence but intervene and supervise when necessary  - Involve family in performance of ADLs  - Assess for home care needs following discharge   - Consider OT consult to assist with ADL evaluation and planning for discharge  - Provide patient education as appropriate  Outcome: Progressing  Goal: Maintains/Returns to pre admission functional level  Description: INTERVENTIONS:  - Perform BMAT or MOVE assessment daily    - Set and communicate daily mobility goal to care team and patient/family/caregiver     - Collaborate with rehabilitation services on mobility goals if consulted  - Ambulate patient 3 times a day  - Out of bed to chair 3 times a day   - Out of bed for meals 3 times a day  - Out of bed for toileting  - Record patient progress and toleration of activity level   Outcome: Progressing     Problem: PAIN - ADULT  Goal: Verbalizes/displays adequate comfort level or baseline comfort level  Description: Interventions:  - Encourage patient to monitor pain and request assistance  - Assess pain using appropriate pain scale  - Administer analgesics based on type and severity of pain and evaluate response  - Implement non-pharmacological measures as appropriate and evaluate response  - Consider cultural and social influences on pain and pain management  - Notify physician/advanced practitioner if interventions unsuccessful or patient reports new pain  Outcome: Progressing     Problem: DISCHARGE PLANNING  Goal: Discharge to home or other facility with appropriate resources  Description: INTERVENTIONS:  - Identify barriers to discharge w/patient and caregiver  - Arrange for needed discharge resources and transportation as appropriate  - Identify discharge learning needs (meds, wound care, etc )  - Refer to Case Management Department for coordinating discharge planning if the patient needs post-hospital services based on physician/advanced practitioner order or complex needs related to functional status, cognitive ability, or social support system  Outcome: Progressing     Problem: Knowledge Deficit  Goal: Patient/family/caregiver demonstrates understanding of disease process, treatment plan, medications, and discharge instructions  Description: Complete learning assessment and assess knowledge base    Interventions:  - Provide teaching at level of understanding  - Provide teaching via preferred learning methods  Outcome: Progressing     Problem: CARDIOVASCULAR - ADULT  Goal: Maintains optimal cardiac output and hemodynamic stability  Description: INTERVENTIONS:  - Monitor I/O, vital signs and rhythm  - Monitor for S/S and trends of decreased cardiac output  - Administer and titrate ordered vasoactive medications to optimize hemodynamic stability  - Assess quality of pulses, skin color and temperature  - Assess for signs of decreased coronary artery perfusion  - Instruct patient to report change in severity of symptoms  Outcome: Progressing  Goal: Absence of cardiac dysrhythmias or at baseline rhythm  Description: INTERVENTIONS:  - Continuous cardiac monitoring, vital signs, obtain 12 lead EKG if ordered  - Administer antiarrhythmic and heart rate control medications as ordered  - Monitor electrolytes and administer replacement therapy as ordered  Outcome: Progressing

## 2023-06-11 NOTE — ASSESSMENT & PLAN NOTE
"· Pt notes ambulatory dysfunction  · Has recent PT with initial improvement, but has not continued with exercises  · Ambulates without assistive device  · CT brain revealed: \"Mild progressive ventriculomegaly without evidence of obstructive hydrocephalus  Findings may be secondary to progressive central volume loss   Findings are equivocal for normal pressure hydrocephalus\"  · PT eval for balance-->if abnormal, will refer to outpt [facility]    "

## 2023-06-11 NOTE — ASSESSMENT & PLAN NOTE
Patient is an 78-year-old male with past medical history significant for hypertension, hypothyroidism, mild dementia who presents to the ER for evaluation of a fall  · Patient's wife notes this is his second fall in the last few months  · Patient notes he awakened during the night to use the restroom, he stand up, began ambulating in his legs gave out  He notes generalized weakness in both legs  He fell  He was unable to get up, so crawled to his wife's room to get help  · He notes at baseline he has an unsteady gait, but does not ambulate with a walker  · Patient denies any current pain anywhere after the fall: He notes at baseline he has chronic pain involving the entirety of both of his legs  · Patient denies any preceding dizziness, lightheadedness, chest pain, palpitations, or syncope  He notes he fell because his legs gave out  · Patient had a CT scan of his brain without any acute abnormalities  Noted to have abrasions on both knees from crawling however no other signs or symptoms of trauma    Full range of motion of extremities without pain  · consult PT/OT  · Fall precautions

## 2023-06-11 NOTE — ASSESSMENT & PLAN NOTE
Patient was recently diagnosed with heart murmur as an outpatient and echocardiogram ordered  follow-up echocardiogram as an inpatient, due to fall, and positive troponin  We will evaluate for aortic stenosis: However patient denies any syncope or symptoms concerning for symptomatic severe aortic stenosis  ECHO pending

## 2023-06-11 NOTE — CASE MANAGEMENT
Case Management Assessment & Discharge Planning Note    Patient name Simon Mitchell  Location East 4 /E4 MS 26-* MRN [id number]  : 1945 Date 2023       Current Admission Date: 6/10/2023  Current Admission Diagnosis:Fall   Patient Active Problem List    Diagnosis Date Noted   • Fall 06/10/2023   • Heart murmur 06/10/2023   • Troponin I above reference range 06/10/2023   • Ambulatory dysfunction 06/10/2023   • Insomnia 2023   • Osteochondroma of femur 2022   • Primary localized osteoarthritis of pelvic region and thigh 2022   • Alcohol use 2022   • Essential hypertension 2020   • Mild dementia ([facility] [address] ) 10/15/2020   • Chronic intractable headache 2020   • BMI 25 0-25 9,adult 2019   • PROVISIONAL Obsessive compulsive personality disorder 2018   • Marital dysfunction 2018   • Memory loss 2015   • Hearing loss 2014   • Cataract 2013   • Tension type headache 2012   • Macrocytosis 2012   • Primary localized osteoarthrosis of the hip 2012   • Benign prostatic hyperplasia 2012   • Erectile dysfunction of non-organic origin 2012   • Hyperlipidemia 2012   • Impaired fasting glucose 2012   • Osteoarthritis 2012   • Anxiety 2012   • Hypothyroidism 2012      LOS (days): 1  Geometric Mean LOS (GMLOS) (days):   Days to GMLOS:     OBJECTIVE:    Risk of Unplanned Readmission Score: 8 7         Current admission status: Inpatient       Preferred Pharmacy:   [id number], [address] [place] [address] [address] [place] [address]  Phone: [phone] Fax: [phone] [address] [place] [address]  Phone: [phone] Fax: [phone]    Primary Care Provider: Dale Gregg MD    Primary Insurance: [address] [place] [address]  Secondary Insurance: ASSESSMENT:  Active Health Care Proxies    There are no active Health Care Proxies on file  Readmission Root Cause  30 Day Readmission: No    Patient Information  Admitted from[de-identified] Home  Mental Status: Alert  During Assessment patient was accompanied by: Spouse (BY telephone)  Assessment information provided by[de-identified] Spouse  Primary Caregiver: Self  Support Systems: Spouse/significant other, Family members  [place] of Residence: [address] [place] [address] do you live in?: [address] entry access options  Select all that apply : Stairs  Number of steps to enter home : 6 (to enter either back or front entrance)  Do the steps have railings?: Yes  Type of Current Residence: [facility]  In the last 12 months, was there a time when you were not able to pay the mortgage or rent on time?: No  In the last 12 months, how many places have you lived?: 1  In the last 12 months, was there a time when you did not have a steady place to sleep or slept in a shelter (including now)?: No  Homeless/housing insecurity resource given?: N/A  Living Arrangements: Lives w/ Spouse/significant other  Is patient a ?: Yes ([address])  Is patient active with [place] [employer])?: No    Activities of Daily Living Prior to Admission  Functional Status: Independent  Completes ADLs independently?: Yes  Ambulates independently?: Yes  Does patient use assisted devices?: No  Does patient currently own DME?: Yes  What DME does the patient currently own?: [employer]  Does patient have a history of Outpatient Therapy (PT/OT)?: Yes  Does the patient have a history of Short-Term Rehab?: No  Does patient have a history of HHC?: Yes  Does patient currently have [facility]?: No    Patient Information Continued  Income Source: Pension/senior care  Does patient have prescription coverage?: Yes  Within the past 12 months, you worried that your food would run out before you got the money to buy more  [place] Never true  Within the past 12 months, the food you bought just didn't last and you didn't have money to get more : Never true  Food insecurity resource given?: N/A  Does patient receive dialysis treatments?: No  Does patient have a history of substance abuse?: No  Does patient have a history of Mental Health Diagnosis?: No    Means of Transportation  Means of Transport to Appts[de-identified] Drives Self  In the past 12 months, has lack of transportation kept you from medical appointments or from getting medications?: No  In the past 12 months, has lack of transportation kept you from meetings, work, or from getting things needed for daily living?: No  Was application for public transport provided?: N/A        DISCHARGE DETAILS:    Discharge planning discussed with[de-identified] Maryan Dallas (Spouse)  [phone] (M)  Freedom of Choice: Yes  Comments - Freedom of Choice: Spouse in agreement with the [facility] Team's recomendations for post discharge services  Spouse aware PT/OT are pending  CM contacted family/caregiver?: Yes  Were Treatment Team discharge recommendations reviewed with patient/caregiver?: Yes  Did patient/caregiver verbalize understanding of patient care needs?: Yes  Were patient/caregiver advised of the risks associated with not following Treatment Team discharge recommendations?: Yes  Contacts  Patient Contacts: Maryan Dallas (Spouse)  [phone] (M)  Relationship to Patient[de-identified] Family  Contact Method: Phone  Phone Number: Maryan Dallas (Spouse)  [phone] (M)    Would you like to participate in our [address] service program?  : Yes    Additional Comments: CM Consult for Post Acute VNA  CM spoke w/ Spouse by telephone and completed assessment  Spouse reports Pt is typically independent, however- at this time Pt is not able to complete all tasks on his own  Spouse reports increased weakness in Pts legs over the past several months  Spouse in agreement with recommendations for both STR or HHC   THerapy evals and recs are pending  If home, Family will tansport Pt home, NOTE: Spouse does not drive  CM following for most appropriate discharge outcome  CM Consult Closed

## 2023-06-11 NOTE — ASSESSMENT & PLAN NOTE
· Patient was seen by neurology in the past and diagnosed with dementia  · He was started on Aricept 10 mg daily which his wife discontinued approximately 1 month ago due to concerns of a personality change  · Patient's home nocturnal regimen of Xanax 0 5 mg, a large glass of wine, Tylenol arthritis plus Tylenol PM is not optimal in the geriatric population  · He also notes worsening gait and ambulatory dysfunction  · Confer with geriatrics team  · TSH, folate normal

## 2023-06-12 PROBLEM — I35.0 AORTIC STENOSIS, MODERATE: Status: ACTIVE | Noted: 2023-06-10

## 2023-06-12 PROCEDURE — 99232 SBSQ HOSP IP/OBS MODERATE 35: CPT | Performed by: PHYSICIAN ASSISTANT

## 2023-06-12 PROCEDURE — 97166 OT EVAL MOD COMPLEX 45 MIN: CPT

## 2023-06-12 PROCEDURE — 99223 1ST HOSP IP/OBS HIGH 75: CPT

## 2023-06-12 RX ADMIN — HEPARIN SODIUM 5000 UNITS: 5000 INJECTION INTRAVENOUS; SUBCUTANEOUS at 21:56

## 2023-06-12 RX ADMIN — AMLODIPINE BESYLATE 10 MG: 10 TABLET ORAL at 21:55

## 2023-06-12 RX ADMIN — LISINOPRIL 20 MG: 20 TABLET ORAL at 08:46

## 2023-06-12 RX ADMIN — MULTIPLE VITAMINS W/ MINERALS TAB 1 TABLET: TAB ORAL at 08:46

## 2023-06-12 RX ADMIN — LEVOTHYROXINE SODIUM 75 MCG: 75 TABLET ORAL at 06:52

## 2023-06-12 RX ADMIN — HYDROCHLOROTHIAZIDE 25 MG: 25 TABLET ORAL at 08:46

## 2023-06-12 RX ADMIN — FOLIC ACID 1 MG: 1 TABLET ORAL at 08:46

## 2023-06-12 RX ADMIN — ALPRAZOLAM 0.5 MG: 0.5 TABLET ORAL at 22:14

## 2023-06-12 RX ADMIN — DOCUSATE SODIUM 100 MG: 100 CAPSULE, LIQUID FILLED ORAL at 17:07

## 2023-06-12 RX ADMIN — TAMSULOSIN HYDROCHLORIDE 0.4 MG: 0.4 CAPSULE ORAL at 17:07

## 2023-06-12 RX ADMIN — THIAMINE HCL TAB 100 MG 100 MG: 100 TAB at 08:46

## 2023-06-12 RX ADMIN — HEPARIN SODIUM 5000 UNITS: 5000 INJECTION INTRAVENOUS; SUBCUTANEOUS at 06:52

## 2023-06-12 RX ADMIN — FLUOXETINE 80 MG: 20 CAPSULE ORAL at 08:46

## 2023-06-12 NOTE — ASSESSMENT & PLAN NOTE
Patient has a history of anxiety  · Is prescribed Prozac 80 mg daily as well as Xanax 0 5 mg at bedtime  · PDMP website was queried, no red flags

## 2023-06-12 NOTE — ASSESSMENT & PLAN NOTE
Patient was recently diagnosed with heart murmur as an outpatient and echocardiogram ordered  follow-up echocardiogram as an inpatient, due to fall, and positive troponin  · We will evaluate for aortic stenosis: However patient denies any syncope or symptoms concerning for symptomatic severe aortic stenosis  · ECHO with normal ejection fraction, grade 1 diastolic dysfunction with moderate aortic stenosis

## 2023-06-12 NOTE — CASE MANAGEMENT
Case Management Discharge Planning Note    Patient name Cara Gama  Location East 4 /E4 MS 26-* MRN [id number]  : 1945 Date 2023       Current Admission Date: 6/10/2023  Current Admission Diagnosis:Fall   Patient Active Problem List    Diagnosis Date Noted   • Fall 06/10/2023   • Aortic stenosis, moderate 06/10/2023   • Troponin I above reference range 06/10/2023   • Ambulatory dysfunction 06/10/2023   • Insomnia 2023   • Osteochondroma of femur 2022   • Primary localized osteoarthritis of pelvic region and thigh 2022   • Alcohol use 2022   • Essential hypertension 2020   • Mild dementia ([address] ) 10/15/2020   • Chronic intractable headache 2020   • BMI 25 0-25 9,adult 2019   • PROVISIONAL Obsessive compulsive personality disorder 2018   • Marital dysfunction 2018   • Memory loss 2015   • Hearing loss 2014   • Cataract 2013   • Tension type headache 2012   • Macrocytosis 2012   • Primary localized osteoarthrosis of the hip 2012   • Benign prostatic hyperplasia 2012   • Erectile dysfunction of non-organic origin 2012   • Hyperlipidemia 2012   • Impaired fasting glucose 2012   • Osteoarthritis 2012   • Anxiety 2012   • Hypothyroidism 2012      LOS (days): 2  Geometric Mean LOS (GMLOS) (days):   Days to GMLOS:     OBJECTIVE:  Risk of Unplanned Readmission Score: 8 74         Current admission status: Inpatient   Preferred Pharmacy:   [id number], [address] [facility] [address] [place] [address]  Phone: [phone] Fax: [phone] [address] [place] [address]  Phone: [phone] Fax: [phone]    Primary Care Provider: Neftali Tapia MD    Primary Insurance: [address] REP  Secondary Insurance:     DISCHARGE DETAILS: Additional Comments: Per therapy, outpatient rehab is being recommended

## 2023-06-12 NOTE — PLAN OF CARE
Problem: OCCUPATIONAL THERAPY ADULT  Goal: Performs self-care activities at highest level of function for planned discharge setting  See evaluation for individualized goals  Description: Treatment Interventions: ADL retraining, Functional transfer training, Endurance training, Equipment evaluation/education, Compensatory technique education, Energy conservation, Activityengagement, Patient/family training          See flowsheet documentation for full assessment, interventions and recommendations     Note: Limitation: Decreased high-level ADLs, Decreased cognition, Decreased Safe judgement during ADL, Decreased self-care trans  Prognosis: Good        OT Discharge Recommendation: Home with outpatient rehabilitation

## 2023-06-12 NOTE — ASSESSMENT & PLAN NOTE
"Patient drinks a very large glass of wine nightly  · We will start thiamine, folic acid, multivitamin  · We will monitor on CIWA scale  · Patient is also prescribed concurrent benzodiazepine with Xanax  Wife is concerned about patient drinking wine and taking xanax every night before bed   States he \"too stubborn\" to make lifestyle changes   "

## 2023-06-12 NOTE — ASSESSMENT & PLAN NOTE
Patient is an 66-year-old male with past medical history significant for hypertension, hypothyroidism, mild dementia who presents to the ER for evaluation of a fall  · Patient's wife notes this is his second fall in the last few months  · He notes at baseline he has an unsteady gait, but does not ambulate with a walker  · Patient had a CT scan of his brain without any acute abnormalities  Noted to have abrasions on both knees from crawling however no other signs or symptoms of trauma    Full range of motion of extremities without pain  · Fall precautions  · Awaiting PT/OT recommendations   · Geriatrics consultation

## 2023-06-12 NOTE — ASSESSMENT & PLAN NOTE
"Pt notes ambulatory dysfunction  · Has recent PT with initial improvement, but has not continued with exercises  · Ambulates without assistive device  · CT brain revealed: \"Mild progressive ventriculomegaly without evidence of obstructive hydrocephalus  Findings may be secondary to progressive central volume loss   Findings are equivocal for normal pressure hydrocephalus\"  · Please outpatient referral to [facility] clinic  · Physical therapy to evaluate    "

## 2023-06-12 NOTE — ASSESSMENT & PLAN NOTE
Patient has essential hypertension  · Continue home medications with Norvasc 10 mg: Which he takes at at bedtime, and lisinopril-HCTZ 20-25 mg he takes in the morning

## 2023-06-12 NOTE — ASSESSMENT & PLAN NOTE
Patient has history of BPH and follows with urology  · Continue Flomax  · Denies any current urinary symptoms

## 2023-06-12 NOTE — CONSULTS
Consultation - Geriatrics   Ashlee Miranda [de-identified] y o  male MRN: [id number]  Unit/Bed#: E4 -01 Encounter: [id number]      Assessment/Plan    Mild Dementia  · Patient follows with neurology in the outpatient setting since 2015  · First started noticing deficits in 2011 after he retired and notes that cognition had slowly and progressively worsened  · He was noted to have difficulty with short term and long term memory   · He was noted to have behavioral issues being racist and possessive   · Most recent visit with neurology on 11/16/2022 noted the following  · Symptoms appear to be stable   · Patient scored 21/30 on MoCA   · Continue Donepezil 10 mg daily   · Remains on Prozac for OCD and Xanax prn   · He is managing his own medication   · Admits to forgetting things if instructed to perform multiple tasks at one time   · Continues to drive without accidents or difficulty   · Patient's wife notes that the patient is no longer taking the donepezil as she feels that it made his memory worse  · The pharmacy is still dispensing this medication so it is unclear if the patient is taking this as he is managing his own medication  · Patients wife notes the patient is stubborn and ritualistic at baseline  · Most recent TSH on 6/10/2023 noted to be 2 172  · Most recent vitamin B12 level on 6/10/2023 noted to be 941  · CT of the head on 6/10/2023 revealed no intracranial hemorrhage or significant white matter disease  · He is alert and oriented x 4 on exam today   · He is noted to be repetitive at times  · Maintain delirium precautions as discussed below  · Redirect and reorient as needed  · Keep physically, mentally, and socially active    Delirium   • Baseline mentation: alert and oriented x 4 but forgetful  • Current mentation: alert and oriented x 4 but forgetful  • Patient is at high risk secondary to age, mild dementia, fall, baseline alcohol use (pateint being monitored for withdrawal), medication administration (patient not taking medication correctly at home, however, those medications are ordered here as prescribed), impaired vision, impaired hearing, sleep disturbance, and hospitalization  • Maintain delirium precautions   · Provide redirection, reorientation, and distraction techniques  · Maintain fall and safety precautions   · Assist with ADLs/IADLs  · Avoid deliriogenic medications such as tramadol, benzodiazepines, anticholinergics, benadryl  · Treat pain using geriatric pain protocol   · Encourage oral hydration and nutrition   · Monitor for constipation and urinary retention   · Implement sleep hygiene and limit night time interuptions   · Maintain sleep-wake cycle   · Encourage early and frequent mobilization   · Encourage participation in group activities  • Most recent EKG on 6/10/2023 revealed a QTc interval of 408  · If all other interventions are unsuccessful for acute agitation and behaviors, can consider Zyprexa 2 5 mg IM Q 8 hours prn   • Would avoid benzodiazepines such as Ativan as these can worsen delirium     Deconditioning   • Baseline function: independent with ADLs and needs some assistance with IADLs  • Patient is at increased risk for deconditioning secondary to mild dementia, weakness, gait dysfunction, alcohol use at home (monitoring for withdrawal), and hospitalization  • Continue to optimize diet, hydration, and mobility for healing  · GFR 82 on labs yesterday   · Keep hydrated  • Monitor for signs and symptoms of infection, dehydration, DVT, and skin breakdown    Frailty   Clinical Frail Scale: 5- Mildly Frail  · More evident slowing, needs help high order IADLs (transport, bills, medications)  · Progressively impairs shopping and walking outside alone, meal prep and housework  • Most recent albumin on 11/7/2019 noted to be 4 1  · Consider rechecking as patients wife notes his appetite has been decreasing and he has been losing weight   · Consider ordering Ensure  • Consider nutrition consult  • Encourage protein supplementation     Ambulatory Dysfunction/Falls  • Patient notes no other recent falls prior to this admission   • He denies ambulating with any assistive devices at baseline   • Patients wife notes increased weakness at baseline   · She notes that he primarily does the grocery shopping but recently has been able to carry the groceries into the house  · The patient has received physical therapy in the past  · Patient's wife notes that the patient is not very active at baseline  · Suspect the following is also contributing to falls   · Xanax use with alcohol at bedtime   · Tylenol PM use with the Xanax and alcohol for sleep  · Unclear if patient is administering medication appropriately at baseline   · PT/OT consulted to assist with strengthening/mobility and assist with discharge planning to appropriate level of care  • Assess patient frequently for physical needs, encourage use of assistant devices as needed and directed by PT/OT  • Identify cognitive and physical deficits and behaviors that affect risk of falls  • Consider moving patient closer to nursing station to monitor more closely for impulsive behavior which may increase risk of falls  • [place] fall and safety precautions   • Educate patient/family on patient safety including physical limitations and importance of using call bell for assistance   • Modify environment to reduce risk of injury including disconnecting from pole when not in use, ensuring adequate lighting in room and restroom, ensuring that path to restroom is clear and free of trip hazards  • Out of bed as tolerated  • Suspect that the patient may need some rehab but if he is cleared to go home he would likely benefit from home or outpatient PT services     Impaired Vision   • Patient does have vision impairment   · He does wear glasses at baseline   • Recommend use of corrective lenses at all appropriate times  • Encourage adequate lighting and encourage use of assistance with ambulation  • Keep personal belongings close to avoid reaching  • Encourage appropriate footwear at all times  • Recommend large font for printed materials provided to patient    Impaired Hearing   • Patient does have some hearing impairment   · Per patient's wife, the patient has been evaluated by audiology and he was recommended for hearing aids which he declined  · Hearing impairment strongly correlated with depression, cognitive impairment, delirium and falls in the older adult  · Use sound amplifier as needed   · Speak face to face  · Use clear dictation and enunciation of words    Dentition/Appetite   • Patient does not wear dentures   • Patient notes that he has a good appetite at baseline   · Wife states that the patient has had decreased appetite and weight loss   · Patient notes that he eats breakfast and dinner   · He denies snacking throughout the day   · Consider ordering Ensure for additional protein supplementation   • Ensure meal consistency is appropriate for all abilities   • Consider nutrition consult   • Continue aspiration precautions     Elimination   • Patient is continent of bowel and bladder at baseline  • Patient denies voiding difficulties or difficulty with bowl movements   • He notes that he has had one bowel movement since admission   · Wife notes he is not eating as much here as he is not allowed to get up and ambulate to the bathroom independently   • Last documented bowel movement on 6/10  • Current bowel regimen includes   · Colace 100 mg BID   • Monitor for constipation and urinary retention     Insomnia   • Patient admits to a history of insomnia   • He states that he takes Xanax at bedtime   • Patient also admits to drinking wine before bed to help him sleep   • Patients wife notes that he also buys Tylenol PM at the store for sleep   · She notes that he takes one pill with the Xanax and wine and then will take an additional pill if he is not sleeping after one "hour   · Wife notes that she attempts to get rid of the Tylenol PM when she knows that he goes to the store  · Patient notes that he does get up during the night to use the bathroom but is typically able to get back to sleep   · Wife notes that he does have a routine the first time he gets up to turn on all the lights in the home before going back to sleep   · Patient notes that he has not been sleeping well here   · Xanax is ordered for bedtime   · Consider adding melatonin to bedtime regimen   · First line is behavioral therapy   · Avoid sedative hypnotics including benzodiazepines and benadryl  · Encourage staying awake during the day   · Encourage daytime activities and morning exercise   · Decrease or eliminate daytime naps   · Avoid caffeine especially during late afternoon and evening hours  · Establish a nighttime routine  · Implement sleep hygiene and limit nighttime interruptions    Anxiety/Depression/Obsessive Compulsive Personality Disorder   • Patient does have a history of anxiety and obsessive compulsive personality disorder   • Per pharmacy, patient is prescribed Prozac 80 mg daily   • Patient initially admits to taking this medication daily   • He states he feels as though he could come off of this medication as he has been doing well at home and does not feel depressed  • Contacted the pharmacy and they indicated the last time this was dispensed was July 2022  • Wife believes that the patient is still receiving this from the [facility] and patient notes the same   • When asked how much the patient is taking he states to his wife \"Im not taking as much as you think I am\"  • He later admitted to taking this once weekly or monthly and then stated he was not taking this   • Discussed with the primary team as he is currently ordered Prozac 80 mg daily  · Primary team to discontinue   · Mood appears stable on exam today   · Continue supportive care     Medication Reconciliation   · Patient " "states that he manages his medication independently at home   · He notes that he has his medication memorized and does not have a paper with any information written on it   · Wife notes that he has a pill container that he manages weekly   · Patient states he only strictly keeps vitamins in the container   · Contacted patients pharmacies to obtain his current medication list (please see below for details)   · Discussed patients medication list with him and his wife and noted the following   · Patient has an active prescription for Trazodone but this had not been picked up since January 2023   · Wife notes that this medication was note helping with sleep so he stopped taking this   · He is still receiving Donepezil from [facility] despite the fact that his wife notes he has not been taking this   · She notes that he has been off of this for some time as his cognitive tests improved off this medication   · It does not appear that Neurology has been notified as they indicated in their last note to continue and he is still receiving this medication from the mail delivery pharmacy   · It is unclear if the patient has been taking this as the pharmacy is still delivering   · Patient currently on Prozac 80 mg   · He admits to taking this initially but pharmacy notes that they last dispensed July 2022   · Patient states he is still receiving this from the pharmacy but [facility] has not delivered this and [facility] did not have this on file   · Patient then admits \"I am not taking as much as you think I am\"   · When asked how much he is taking he states that he takes it weekly or monthly   · He later states he has not been taking this medication   · Patients wife notes that she had been taking this as well but this was discontinued   · She admits her dose was different than his so it is unclear what dose he is or is not taking    · I am very concerned about the patient managing his own medication   · I am not sure that he " knows exactly what he is taking and I am concerned for incorrect administration of medication   · Patient does need more oversight of his medication at home   · Discussed this with patients wife   · I discussed my concerns about the patient managing his medication with him and his wife   · If patient is to go home on discharge I would recommend that [facility] come into the home to assist with medication administration   · Can consider locked medication box to assist with medication administration   · Could also consider blister packs, however, the patient is using two different pharmacies which may make this difficult   · Would recommend sooner follow-up with Neurology (patient notes he is scheduled to return in November) and a hospital follow-up visit with his PCP    Alcohol Use  · Patient notes that he drinks one glass of wine per night   · Wife notes that the patient drinks 12-14 oz of wine per night to help with sleep   · He does take this with Xanax nightly as well as Tylenol PM   · Discussed concerns regarding patient drinking and taking these medications together   · Patient remains on CIWA protocol while inpatient   · He is also ordered thiamine, folic acid, and a multivitamin   · Continue to educate on alcohol cessation and concurrent use with benzodiazepines and Tylenol PM     Home Safety  · Patient lives at home with his wife and son   · Patients wife manages cooking, cleaning, and finances   · Patient notes that he does laundry and runs the    · Patient manages his own medication   · He states that he does still drive     Advanced Care Planning  · Level 1 Full Code    Home Medication Review   [facility] ([phone])  · Alprazolam 0 5 mg (last filled 4/24 for 90 days) daily at bedtime as needed   · Amlodipine 10 mg daily   · Dutasteride-Tamsulosin 0 5-0 4 mg daily at bedtime  · Trazodone 50 mg daily at bedtime (last filled in January for 30 day supply but still active on file)    [facility] ([phone])     Last filled 6/5/23  · Donepezil 10 mg daily   · Lisinopril-HCTZ 20-25 mg daily     Last filled 3/11/23  · levothryoxine 75 mcg daily     Last filled 12/22/22  · Alprazolam 0 5 mg daily at bedtime prn     Last filled 12/19/22  · Dutasteride/tamsulosin 0 5-0 4 mg daily     Last filled 7/19/22  · Prozac 40 mg 2 capsules daily     I have personally reviewed this medication list with the patients pharmacy listed above  History of Present Illness   Physician Requesting Consult: Cordell Barkley DO  Reason for Consult / Principal Problem: Generalized weakness and fall   Hx and PE limited by: Confusion at times    HPI: Ashlee Miranda is a [de-identified]y o  year old male who has hypertension, hypothyroidism, BPH, anxiety, alcohol use, hyperlipidemia, obsessive-compulsive personality disorder, and mild dementia and presented to the hospital after sustaining a fall at home  He was in his room and awakened to use the restroom  He began ambulating to the bathroom and felt both of his legs give out  He noted that he was having generalized weakness and was unable to get up  He called to his wife's room and was able to knock on the door to alert her  He had been denying pain  A CT of his head was negative for any acute abnormalities  He was noted to have abrasions on both knees from crawling but no other signs of trauma  He did have full range of motion of all his extremities without pain  He was noted to have a positive troponin of 66 on admission  An EKG revealed a T wave inversion  The patient had denied any cardiac symptoms  PT and OT have been consulted  Geriatrics is being consulted for generalized weakness and fall  The patient states that he lives at home with his wife  His wife notes that their son also lives with them  He states that his wife manages the cooking, cleaning, and finances  He notes that he does laundry and runs the     He states that he manages his medications and he does still drive  The patient denies any recent memory changes  His wife at the bedside notes that he is very ritualistic and thrives off of a routine at home  She notes that recently he does have some repetitive behaviors such as wrapping his fingers in his blanket  She notes that he often will leave on the air conditioner and the heat in the home  She states that when he gets up at night he will often check the house and turn all the lights on before going back to bed  She notes no physical behaviors or aggressions  The patient denies any prior history of falls  His wife notes that she has noticed he has been losing muscle tone  She notes that he has not physically active at baseline  She states that he had been working with physical therapy and was getting stronger but this was discontinued  She states that she has noticed increased weakness as he will often go to the store and will be unable to bring in the groceries as he is too weak  He does not ambulate with any assistive devices at baseline  He does have impaired vision for which she wears glasses  He and his wife also note that he has impaired hearing  She states that he has been evaluated by an audiologist but declined hearing aids  He does not wear dentures  He states that he has a good appetite at baseline  His wife notes that he has not been eating as well at home  He states that he eats breakfast and dinner daily and does not snack throughout the day  He notes that he is continent of bowel and bladder at baseline  He denies any voiding difficulties or difficulty with constipation or diarrhea  He notes that he does have a history of insomnia for which she takes Xanax at baseline  His wife is concerned as he also drinks wine with the Xanax and takes Tylenol PM   She states that he will typically take 1 Tylenol PM and then attempt to go to sleep    She notes that if he is unable to fall asleep he will wake up and take anothe Tylenol PM   She is concerned about him taking these medications with the wine  He denies any history of anxiety or depression  He is not sure why he still is on the Prozac  He believes that he does not need this  The patient was seen and evaluated today at the bedside for geriatric consult  He is noted to be lying in bed comfortably in no acute distress  He states that he would like to be discharged home today and is frustrated that he is unable to  He is alert and oriented x4 on exam today but is forgetful and repetitive at times  He denies dizziness, headaches, and vision changes  He denies chest pain or shortness of breath  He denies nausea, vomiting, constipation, and diarrhea  He states that he has been eating well here  His wife at the bedside notes this is not accurate as he does not want to have bowel movements as he is unable to get up without assistance  He states that he is not sleeping well here  Care was also coordinated with patients nurse Kev Weaver  She notes no acute issues or events overnight  Care was also coordinated with Lianet Paredes PA-C with internal medicine over the phone  Inpatient consult to Gerontology  Consult performed by: LOLLY Gandhi  Consult ordered by: Anne Marie Polanco MD        Review of Systems   Constitutional: Positive for activity change  Negative for appetite change, chills, fatigue and fever  HENT: Positive for hearing loss  Eyes: Positive for visual disturbance (glasses)  Respiratory: Negative for cough and shortness of breath  Cardiovascular: Negative for chest pain and leg swelling  Gastrointestinal: Negative for abdominal distention, abdominal pain, constipation, diarrhea, nausea and vomiting  Genitourinary: Negative for difficulty urinating and dysuria  Musculoskeletal: Positive for gait problem  Negative for arthralgias  Skin: Negative for color change and pallor  Neurological: Positive for weakness   Negative for dizziness and headaches  Psychiatric/Behavioral: Positive for sleep disturbance  Negative for behavioral problems and confusion  The patient is not nervous/anxious  Historical Information   Past Medical History:   Diagnosis Date   • Abnormal weight loss    • Anxiety    • BPH (benign prostatic hyperplasia)    • Bursitis of left hip    • Cervical radiculopathy    • Colon polyps    • Dementia (HCC)    • Depression    • Disease of thyroid gland    • Heart murmur    • Hypertension    • Nicotine dependence in remission    • Non-toxic multinodular goiter    • Psychiatric disorder      Past Surgical History:   Procedure Laterality Date   • Corrine Echevaria Left     Dr Loretta Sanchez; onset: 8/6/13   • COLONOSCOPY      3/20/13, normal clon - Dr Tara Marie, repeat in 5-10 years   • HIP SURGERY     • MS COLONOSCOPY FLX DX W/[facility] WHEN PFRMD N/A 3/28/2018    Procedure: COLONOSCOPY;  Surgeon: Yelena Campos MD;  Location: [facility];   Service: Colorectal   • THYROIDECTOMY, PARTIAL     • TONSILLECTOMY     • TOTAL HIP ARTHROPLASTY       Social History   Social History     Substance and Sexual Activity   Alcohol Use Yes   • Alcohol/week: 2 0 standard drinks of alcohol   • Types: 2 Glasses of wine per week    Comment: 12 oz every night     Social History     Substance and Sexual Activity   Drug Use No     Social History     Tobacco Use   Smoking Status Former   Smokeless Tobacco Never     Family History:   Family History   Problem Relation Age of Onset   • Diabetes Father    • Lung cancer Father    • Alcohol abuse Paternal Uncle    • Heart disease Paternal Uncle        Meds/Allergies   Current meds:   Current Facility-Administered Medications   Medication Dose Route Frequency   • ALPRAZolam (XANAX) tablet 0 5 mg  0 5 mg Oral HS PRN   • amLODIPine (NORVASC) tablet 10 mg  10 mg Oral HS   • docusate sodium (COLACE) capsule 100 mg  100 mg Oral BID   • FLUoxetine (PROzac) capsule 80 mg  80 mg Oral Daily   • folic acid "(FOLVITE) tablet 1 mg  1 mg Oral Daily   • heparin (porcine) subcutaneous injection 5,000 Units  5,000 Units Subcutaneous Q8H [facility]   • lisinopril (ZESTRIL) tablet 20 mg  20 mg Oral Daily    And   • hydrochlorothiazide (HYDRODIURIL) tablet 25 mg  25 mg Oral Daily   • levothyroxine tablet 75 mcg  75 mcg Oral Early Morning   • multivitamin-minerals (CENTRUM) tablet 1 tablet  1 tablet Oral Daily   • ondansetron (ZOFRAN) injection 4 mg  4 mg Intravenous Q6H PRN   • tamsulosin (FLOMAX) capsule 0 4 mg  0 4 mg Oral Daily With Dinner   • thiamine tablet 100 mg  100 mg Oral Daily        Allergies   Allergen Reactions   • Aspirin      Other reaction(s): Blood Disorders, rectal bleeding/friable prostate  Category: Adverse Reaction;        Objective   Vitals: Blood pressure 159/86, pulse 65, temperature 98 °F (36 7 °C), temperature source Temporal, resp  rate 18, height 5' 8\" (1 727 m), weight 73 3 kg (161 lb 9 6 oz), SpO2 95 %  ,Body mass index is 24 57 kg/m²  Physical Exam  Vitals and nursing note reviewed  Constitutional:       General: He is not in acute distress  Appearance: Normal appearance  He is not ill-appearing  HENT:      Head: Normocephalic  Mouth/Throat:      Mouth: Mucous membranes are moist    Eyes:      General: No scleral icterus  Conjunctiva/sclera: Conjunctivae normal    Cardiovascular:      Rate and Rhythm: Normal rate and regular rhythm  Pulmonary:      Effort: Pulmonary effort is normal  No respiratory distress  Abdominal:      General: Bowel sounds are normal  There is no distension  Palpations: Abdomen is soft  Tenderness: There is no abdominal tenderness  Musculoskeletal:         General: No swelling or tenderness  Skin:     General: Skin is warm and dry  Neurological:      General: No focal deficit present  Mental Status: He is alert and oriented to person, place, and time  Mental status is at baseline  Motor: Weakness present        Gait: Gait abnormal  " "     Comments: Forgetful and repetitive at times         Lab Results:   Results from last 7 days   Lab Units 06/11/23  0558   HEMATOCRIT % 37 2   HEMOGLOBIN g/dL 12 3   PLATELETS Thousands/uL 279   WBC Thousand/uL 5 85        Results from last 7 days   Lab Units 06/11/23  0558   BUN mg/dL 16   CALCIUM mg/dL 8 7   CHLORIDE mmol/L 107   CO2 mmol/L 28   CREATININE mg/dL 0 85   POTASSIUM mmol/L 3 9       Imaging Studies: I have personally reviewed pertinent reports  EKG, Pathology, and Other Studies: I have personally reviewed pertinent reports  VTE Prophylaxis: Heparin    Code Status: Level 1 - Full Code      Please note:  Voice-recognition software may have been used in the preparation of this document  Occasional wrong word or \"sound-alike\" substitutions may have occurred due to the inherent limitations of voice recognition software  Interpretation should be guided by context      "

## 2023-06-12 NOTE — ASSESSMENT & PLAN NOTE
· Patient presented with a positive troponin of 66 to 76 to 69  · Initial EKG with T wave inversion in aVL: No previous EKG available for comparison  · He denies any cardiac symptoms

## 2023-06-12 NOTE — UTILIZATION REVIEW
Date: 6/12 Day 3: Has surpassed a 2nd midnight with active treatments and services  Ongoing therapy and geriatric consult  Initial Clinical Review    Admission: Date/Time/Statement:   Admission Orders (From admission, onward)     Ordered        06/10/23 1121  INPATIENT ADMISSION  Once                      Orders Placed This Encounter   Procedures   • INPATIENT ADMISSION     Standing Status:   Standing     Number of Occurrences:   1     Order Specific Question:   Level of Care     Answer:   Med Surg [16]     Order Specific Question:   Estimated length of stay     Answer:   More than 2 Midnights     Order Specific Question:   Certification     Answer:   I certify that inpatient services are medically necessary for this patient for a duration of greater than two midnights  See H&P and MD Progress Notes for additional information about the patient's course of treatment  ED Arrival Information     Expected   -    Arrival   6/10/2023 08:59    Acuity   Urgent            Means of arrival   Stretcher    Escorted by   [facility]    Admission type   Emergency            Arrival complaint   weakness           Chief Complaint   Patient presents with   • Fall     Patient got oob this AM and became dizzy and fell  Was unable to get up from floor because of weakness  Denies head strike and thinners  Initial Presentation: [de-identified] y o  male presents to the ED via EMS from home with c/o fall at home w/o prodromal symptoms, w/o head strike, followed by weakness and inability to get up, no c/o new pain, has chronic pain BLE w/ unsteady gait, generalized weakness  Has no new complaints post fall  PMH: anxiety, BPH, hypothyroidism, HTN, heart murmur, mild dementia  In the ED Labs - elevated CRP, troponins  Imaging - Mild progressive ventriculomegaly  ECG  - Initial EKG with T wave inversion  Treated with IV fluids, Sq Heparin, thiamine, folvite, MVI and home meds    On exam has faint abrasion on both knees, no abnormal breath sounds  He is admitted to INPATIENT status with Fall and Ambulatory dysfunction - therapy eval, fall precautions  Troponin elevation  - no cardiac symptoms  Mild dementia - Geriatric consult, Check B12/TSH/folate/esr  Heart murmur - echo  Alcohol use - CIWA - initial 1 then 0, also on Xanax nightly - will continue to avoid benzo withdrawal   Post admit note - pt with some hypoxia overnight, put on oxygen 2L NC  Lungs clear  Imaging unremarkable  Date: 6/11   Day 2:   CIWA remains 0  Off oxygen, VS stable  Only complaint is chronic leg pain  On exam L knee with some swelling, R knee has small superficial abrasion  Date: 6/12  Day 3:   Therapy recommending home with OP rehab        ED Triage Vitals   Temperature Pulse Respirations Blood Pressure SpO2   06/10/23 1007 06/10/23 0915 06/10/23 0915 06/10/23 0915 06/10/23 0915   97 5 °F (36 4 °C) 80 20 156/77 98 %      Temp Source Heart Rate Source Patient Position - Orthostatic VS BP Location FiO2 (%)   06/10/23 1007 06/10/23 0915 06/10/23 0915 06/10/23 0915 --   Oral Monitor Lying Left arm       Pain Score       06/10/23 1516       No Pain          Wt Readings from Last 1 Encounters:   06/12/23 73 3 kg (161 lb 9 6 oz)     Additional Vital Signs:   06/12/23 0700 98 °F (36 7 °C) 65 18 159/86 -- 95 % -- -- None (Room air) Lying   06/12/23 0022 97 6 °F (36 4 °C) 78 18 150/91 116 96 % -- -- None (Room air) Lying   06/11/23 1930 -- -- -- -- -- -- -- -- None (Room air) --   06/11/23 1541 97 5 °F (36 4 °C) 65 18 170/81 -- 95 % -- -- None (Room air) Lying   06/11/23 1140 -- 70 -- 163/76 -- -- -- -- -- --   06/11/23 1124 97 8 °F (36 6 °C) 66 18 163/76 109 95 % -- -- None (Room air) Lying   06/11/23 0720 97 5 °F (36 4 °C) 121 Abnormal  20 142/83 106 98 % -- -- None (Room air) Lying   06/11/23 0716 97 6 °F (36 4 °C) 63 18 172/75 Abnormal  108 96 % -- -- None (Room air) Lying   06/11/23 0328 97 7 °F (36 5 °C) 65 18 152/70 101 97 % 28 2 L/min Nasal cannula Lying   06/10/23 2319 98 °F (36 7 °C) 68 20 157/71 102 98 % 28 2 L/min Nasal cannula Lying   06/10/23 2136 -- -- -- -- -- 91 % -- -- None (Room air) --   06/10/23 2135 -- -- -- -- -- 85 % Abnormal  -- -- None (Room air) --   06/10/23 1930 -- -- -- -- -- -- -- -- None (Room air) --   06/10/23 1920 97 7 °F (36 5 °C) 64 22 157/71 102 97 % -- -- None (Room air) Lying   06/10/23 1611 97 5 °F (36 4 °C) 67 22 144/81 104 98 % -- -- None (Room air) Lying   06/10/23 1347 -- -- -- -- -- -- -- -- None (Room air) --   06/10/23 1330 -- 65 24 Abnormal  142/63 90 97 % -- -- None (Room air) Lying   06/10/23 1300 -- 62 16 157/70 101 97 % -- -- None (Room air) Lying   06/10/23 1230 -- 62 18 163/70 101 96 % -- -- -- Lying   06/10/23 1130 -- 67 17 150/67 96 98 % -- -- None (Room air) Lying   06/10/23 1115 -- 67 17 149/70 101 97 % -- -- None (Room air) Lying   06/10/23 1100 -- 68 18 153/70 100 98 % -- -- None (Room air) Lying   06/10/23 1007 97 5 °F (36 4 °C) -- -- -- -- -- -- -- -- --   06/10/23 1000 -- 66 14 155/70 100 99 % -- -- None (Room air) Lying     Pertinent Labs/Diagnostic Test Results:     6/10 ECG - Normal sinus rhythm  Left axis deviation  Anterior infarct , age undetermined  Abnormal ECG  6/10 ECG - Sinus rhythm with 1st degree A-V block  Left axis deviation  Possible Anterior infarct (cited on or before 10-JUAN CARLOS-2023)  Abnormal ECG  6/10 ECG - Normal sinus rhythm  Possible Anterior infarct , age undetermined  Left axis deviation  Abnormal ECG    1  6/11 Echo - Mild concentric left ventricular hypertrophy, normal left ventricle systolic function, grade 1 diastolic dysfunction  Left ventricular ejection fraction is estimated around 64%  2  Normal right ventricle size and systolic function  3  Normal left and right atrial cavity size  4  Moderately thickened aortic valve with sclerosis and focal calcification and reduced cuspal separation    Moderate aortic valve stenosis and mild to moderate aortic valve regurgitation AV Vmax 3 29 m/sec, mean gradient 19 8 mm Hg, AV Area 1 3 cm², DV Index is 0 42, LV SV Index 50 17 mL/m²)  5  Mitral valvular sclerosis, trace mitral valve regurgitation  6  Mild tricuspid and trace pulmonic valve regurgitation  7  No obvious pulmonary hypertension  8  No pericardial effusion    No previous echocardiogram is available for comparison      XR chest portable   Final Result by Sohail Villarreal MD (06/11 1438)      No acute cardiopulmonary disease  CT head without contrast   Final Result by Cony Yoon MD (06/10 1036)      No acute intracranial abnormality  Mild progressive ventriculomegaly without evidence of obstructive hydrocephalus  Findings may be secondary to progressive central volume loss  Findings are equivocal for normal pressure hydrocephalus           Results from last 7 days   Lab Units 06/11/23  0558 06/10/23  1406 06/10/23  0926   HEMATOCRIT % 37 2  --  38 8   HEMOGLOBIN g/dL 12 3  --  12 8   NEUTROS ABS Thousands/µL  --   --  6 50   PLATELETS Thousands/uL 279 301 302   WBC Thousand/uL 5 85  --  8 80         Results from last 7 days   Lab Units 06/11/23  0558 06/10/23  0926   ANION GAP mmol/L 3* 6   BUN mg/dL 16 24   CALCIUM mg/dL 8 7 9 5   CHLORIDE mmol/L 107 105   CO2 mmol/L 28 25   CREATININE mg/dL 0 85 0 85   EGFR ml/min/1 73sq m 82 82   POTASSIUM mmol/L 3 9 4 1   SODIUM mmol/L 138 136             Results from last 7 days   Lab Units 06/11/23  0558 06/10/23  0926   GLUCOSE RANDOM mg/dL 95 93     Results from last 7 days   Lab Units 06/10/23  0926   CK TOTAL U/L 264     Results from last 7 days   Lab Units 06/10/23  1329 06/10/23  1144 06/10/23  0926   HS TNI 0HR ng/L  --   --  66*   HS TNI 2HR ng/L  --  76*  --    HS TNI 4HR ng/L 69*  --   --    HSTNI D2 ng/L  --  10  --    HSTNI D4 ng/L 3  --   --              Results from last 7 days   Lab Units 06/10/23  1406   TSH 3RD GENERATON uIU/mL 2 172     Results from last 7 days   Lab Units 06/10/23  0694 BNP pg/mL 44     Results from last 7 days   Lab Units 06/10/23  1406   CRP mg/L 3 4*         Results from last 7 days   Lab Units 06/10/23  1406 06/10/23  1158   BILIRUBIN UA  Negative Negative   BLOOD UA  Negative Negative   CLARITY UA  Clear Clear   COLOR UA  Light Yellow Yellow   GLUCOSE UA mg/dl Negative Negative   KETONES UA mg/dl Negative Negative   LEUKOCYTES UA  Negative Negative   NITRITE UA  Negative Negative   PH UA  6 5 6 5   PROTEIN UA mg/dl Negative Negative   SPEC GRAV UA  1 020 1 020   UROBILINOGEN UA E U /dl  --  0 2   UROBILINOGEN UA (BE) mg/dl <2 0  --      ED Treatment:   Medication Administration from 06/10/2023 0859 to 06/10/2023 1439       Date/Time Order Dose Route Action     06/10/2023 1415 EDT FLUoxetine (PROzac) capsule 80 mg 80 mg Oral Given     06/10/2023 1358 EDT lisinopril (ZESTRIL) tablet 20 mg 20 mg Oral Given     06/10/2023 1358 EDT hydrochlorothiazide (HYDRODIURIL) tablet 25 mg 25 mg Oral Given     06/10/2023 1407 EDT sodium chloride 0 9 % infusion 75 mL/hr Intravenous New Bag     06/10/2023 1359 EDT heparin (porcine) subcutaneous injection 5,000 Units 5,000 Units Subcutaneous Given     06/10/2023 1359 EDT thiamine tablet 100 mg 100 mg Oral Given     80/61/3703 6978 EDT folic acid (FOLVITE) tablet 1 mg 1 mg Oral Given     06/10/2023 1358 EDT multivitamin-minerals (CENTRUM) tablet 1 tablet 1 tablet Oral Given        Past Medical History:   Diagnosis Date   • Abnormal weight loss    • Anxiety    • BPH (benign prostatic hyperplasia)    • Bursitis of left hip    • Cervical radiculopathy    • Colon polyps    • Dementia (HCC)    • Depression    • Disease of thyroid gland    • Heart murmur    • Hypertension    • Nicotine dependence in remission    • Non-toxic multinodular goiter    • Psychiatric disorder      Present on Admission:  • Anxiety  • Benign prostatic hyperplasia  • Essential hypertension  • Hypothyroidism  • Mild dementia ([facility] [address] )  • Alcohol use      Admitting Diagnosis: Elevated troponin [R77 8]  Generalized weakness [R53 1]  Relational problem due to medical condition [Z63 8]  Age/Sex: [de-identified] y o  male  Admission Orders:  Scheduled Medications:  amLODIPine, 10 mg, Oral, HS  docusate sodium, 100 mg, Oral, BID  FLUoxetine, 80 mg, Oral, Daily  folic acid, 1 mg, Oral, Daily  heparin (porcine), 5,000 Units, Subcutaneous, Q8H MARILEE  lisinopril, 20 mg, Oral, Daily   And  hydrochlorothiazide, 25 mg, Oral, Daily  levothyroxine, 75 mcg, Oral, Early Morning  multivitamin-minerals, 1 tablet, Oral, Daily  tamsulosin, 0 4 mg, Oral, Daily With Dinner  thiamine, 100 mg, Oral, Daily      Continuous IV Infusions:  IV NSS @ 75 ml/hr - d/c 6/11 PM     PRN Meds:  ALPRAZolam, 0 5 mg, Oral, HS PRN - x 1 610, 6/11  ondansetron, 4 mg, Intravenous, Q6H PRN    Cont pulse ox  Incentive spirometry  OOB  Daily wt  CIWA  Cont pulse ox  IP CONSULT TO CASE MANAGEMENT  IP CONSULT TO GERONTOLOGY    Network Utilization Review Department  ATTENTION: Please call with any questions or concerns to [phone] and carefully listen to the prompts so that you are directed to the right person  All voicemails are confidential   Cathaleen Sandhoff all requests for admission clinical reviews, approved or denied determinations and any other requests to dedicated fax number below belonging to the campus where the patient is receiving treatment   List of dedicated fax numbers for the Facilities:  [address] DENIALS (Administrative/Medical Necessity) [phone]   [address] (Maternity/NICU/Pediatrics) [phone]   [address] Enriqueta Carrillo [phone]   [facility] [phone]   [facility] [phone]   [address] Jose Ramon [id number] [place] [address] [address] [facility] 208 [facility] [phone] [address] Janki Johnson [place] [address] 155 [facility] [phone]

## 2023-06-12 NOTE — ASSESSMENT & PLAN NOTE
Patient was seen by neurology in the past and diagnosed with dementia  · He was started on Aricept 10 mg daily which his wife discontinued approximately 1 month ago due to concerns of a personality change  · Patient's home nocturnal regimen of Xanax 0 5 mg, a large glass of wine, Tylenol arthritis plus Tylenol PM is not optimal in the geriatric population  · He also notes worsening gait and ambulatory dysfunction  · Confer with geriatrics team, consult pending  · TSH, folate normal

## 2023-06-12 NOTE — PROGRESS NOTES
"[facility]  Progress Note  Name: Franchesca Swartz  MRN: [id number]  Unit/Bed#: E4 -01 I Date of Admission: 6/10/2023   Date of Service: 6/12/2023 I Hospital Day: 2    Assessment/Plan   * Fall  Assessment & Plan  Patient is an 78-year-old male with past medical history significant for hypertension, hypothyroidism, mild dementia who presents to the ER for evaluation of a fall  · Patient's wife notes this is his second fall in the last few months  · He notes at baseline he has an unsteady gait, but does not ambulate with a walker  · Patient had a CT scan of his brain without any acute abnormalities  Noted to have abrasions on both knees from crawling however no other signs or symptoms of trauma  Full range of motion of extremities without pain  · Fall precautions  · Awaiting PT/OT recommendations   · Geriatrics consultation    Ambulatory dysfunction  Assessment & Plan  Pt notes ambulatory dysfunction  · Has recent PT with initial improvement, but has not continued with exercises  · Ambulates without assistive device  · CT brain revealed: \"Mild progressive ventriculomegaly without evidence of obstructive hydrocephalus  Findings may be secondary to progressive central volume loss   Findings are equivocal for normal pressure hydrocephalus\"  · Please outpatient referral to [facility]  · Physical therapy to evaluate      Troponin I above reference range  Assessment & Plan  · Patient presented with a positive troponin of 66 to 76 to 69  · Initial EKG with T wave inversion in aVL: No previous EKG available for comparison  · He denies any cardiac symptoms    Aortic stenosis, moderate  Assessment & Plan  Patient was recently diagnosed with heart murmur as an outpatient and echocardiogram ordered  follow-up echocardiogram as an inpatient, due to fall, and positive troponin  · We will evaluate for aortic stenosis: However patient denies any syncope or symptoms concerning for symptomatic " "severe aortic stenosis  · ECHO with normal ejection fraction, grade 1 diastolic dysfunction with moderate aortic stenosis    Alcohol use  Assessment & Plan  Patient drinks a very large glass of wine nightly  · We will start thiamine, folic acid, multivitamin  · We will monitor on CIWA scale  · Patient is also prescribed concurrent benzodiazepine with Xanax  Wife is concerned about patient drinking wine and taking xanax every night before bed  States he \"too stubborn\" to make lifestyle changes     Essential hypertension  Assessment & Plan  Patient has essential hypertension  · Continue home medications with Norvasc 10 mg: Which he takes at at bedtime, and lisinopril-HCTZ 20-25 mg he takes in the morning    Mild dementia [facility])  Assessment & Plan  Patient was seen by neurology in the past and diagnosed with dementia  · He was started on Aricept 10 mg daily which his wife discontinued approximately 1 month ago due to concerns of a personality change  · Patient's home nocturnal regimen of Xanax 0 5 mg, a large glass of wine, Tylenol arthritis plus Tylenol PM is not optimal in the geriatric population  · He also notes worsening gait and ambulatory dysfunction  · Confer with geriatrics team, consult pending  · TSH, folate normal    Hypothyroidism  Assessment & Plan  Continue Synthroid    Benign prostatic hyperplasia  Assessment & Plan  Patient has history of BPH and follows with urology  · Continue Flomax  · Denies any current urinary symptoms    Anxiety  Assessment & Plan  Patient has a history of anxiety  · Is prescribed Prozac 80 mg daily as well as Xanax 0 5 mg at bedtime  · PDMP website was queried, no red flags          VTE Pharmacologic Prophylaxis: VTE Score: 4 Moderate Risk (Score 3-4) - Pharmacological DVT Prophylaxis Ordered: heparin  Patient Centered Rounds: I performed bedside rounds with nursing staff today    Discussions with Specialists or Other Care Team Provider: None    Education and Discussions with " Family / Patient: Attempted to update  (wife) via phone  Unable to contact  Total Time Spent on Date of Encounter in care of patient: 35 minutes This time was spent on one or more of the following: performing physical exam; counseling and coordination of care; obtaining or reviewing history; documenting in the medical record; reviewing/ordering tests, medications or procedures; communicating with other healthcare professionals and discussing with patient's family/caregivers  Current Length of Stay: 2 day(s)  Current Patient Status: Inpatient   Certification Statement: The patient will continue to require additional inpatient hospital stay due to Geriatrics evaluation, physical therapy commendations  Discharge Plan: Anticipate discharge tomorrow to discharge location to be determined pending rehab evaluations  Code Status: Level 1 - Full Code    Subjective: The patient is seen resting in bed  He is eager to go home, states that he has not yet been seen by anyone else today  We are awaiting geriatrics and physical therapy consultations at this time  He reports that his weakness feels about the same as when he initially presented  He denies any loss of consciousness, states that he fell twice within the last 6 months  Objective:     Vitals:   Temp (24hrs), Av 7 °F (36 5 °C), Min:97 5 °F (36 4 °C), Max:98 °F (36 7 °C)    Temp:  [97 5 °F (36 4 °C)-98 °F (36 7 °C)] 98 °F (36 7 °C)  HR:  [65-78] 65  Resp:  [18] 18  BP: (150-170)/(81-91) 159/86  SpO2:  [95 %-96 %] 95 %  Body mass index is 24 57 kg/m²  Input and Output Summary (last 24 hours): Intake/Output Summary (Last 24 hours) at 2023 1325  Last data filed at 2023 0846  Gross per 24 hour   Intake 240 ml   Output 1150 ml   Net -910 ml       Physical Exam:   Physical Exam  Vitals and nursing note reviewed  Constitutional:       General: He is not in acute distress  Appearance: Normal appearance   He is not ill-appearing, toxic-appearing or diaphoretic  HENT:      Head: Normocephalic and atraumatic  Cardiovascular:      Rate and Rhythm: Normal rate and regular rhythm  Heart sounds: Murmur heard  No friction rub  No gallop  Pulmonary:      Effort: Pulmonary effort is normal  No respiratory distress  Breath sounds: Normal breath sounds  No wheezing, rhonchi or rales  Abdominal:      General: Abdomen is flat  Bowel sounds are normal  There is no distension  Palpations: Abdomen is soft  Tenderness: There is no abdominal tenderness  Musculoskeletal:      Right lower leg: No edema  Left lower leg: No edema  Skin:     General: Skin is warm and dry  Coloration: Skin is not jaundiced or pale  Neurological:      General: No focal deficit present  Mental Status: He is alert  Mental status is at baseline  Additional Data:     Labs:  Results from last 7 days   Lab Units 06/11/23  0558 06/10/23  1406 06/10/23  0926   EOS PCT %  --   --  3   HEMATOCRIT % 37 2  --  38 8   HEMOGLOBIN g/dL 12 3  --  12 8   LYMPHS PCT %  --   --  13*   MONOS PCT %  --   --  10   NEUTROS PCT %  --   --  73   PLATELETS Thousands/uL 279   < > 302   WBC Thousand/uL 5 85  --  8 80    < > = values in this interval not displayed  Results from last 7 days   Lab Units 06/11/23  0558   ANION GAP mmol/L 3*   BUN mg/dL 16   CALCIUM mg/dL 8 7   CHLORIDE mmol/L 107   CO2 mmol/L 28   CREATININE mg/dL 0 85   GLUCOSE RANDOM mg/dL 95   POTASSIUM mmol/L 3 9   SODIUM mmol/L 138                       Lines/Drains:  Invasive Devices     Peripheral Intravenous Line  Duration           Peripheral IV 06/11/23 Left;Proximal;Ventral (anterior) Forearm 1 day                      Imaging: No pertinent imaging reviewed      Recent Cultures (last 7 days):         Last 24 Hours Medication List:   Current Facility-Administered Medications   Medication Dose Route Frequency Provider Last Rate   • ALPRAZolam  0 5 mg Oral HS PRN Stanley Truong MD     • amLODIPine  10 mg Oral HS Stanley Truong MD     • docusate sodium  100 mg Oral BID Stanley Truong MD     • FLUoxetine  80 mg Oral Daily Stanley Truong MD     • folic acid  1 mg Oral Daily Stanley Truong MD     • heparin (porcine)  5,000 Units Subcutaneous [facility] Stanley Truong MD     • lisinopril  20 mg Oral Daily Stanley Truong MD      And   • hydrochlorothiazide  25 mg Oral Daily Stanley Truong MD     • levothyroxine  75 mcg Oral Early Morning Stanley Truong MD     • multivitamin-minerals  1 tablet Oral Daily Stanley Truong MD     • ondansetron  4 mg Intravenous Q6H PRN Stalney Truong MD     • tamsulosin  0 4 mg Oral Daily With Jose Alfredo Robles MD     • thiamine  100 mg Oral Daily Stanley Truong MD          Today, Patient Was Seen By: Leah Fitzpatrick PA-C    **Please Note: This note may have been constructed using a voice recognition system  **

## 2023-06-13 VITALS
RESPIRATION RATE: 18 BRPM | WEIGHT: 159.39 LBS | OXYGEN SATURATION: 94 % | HEIGHT: 68 IN | TEMPERATURE: 97.3 F | BODY MASS INDEX: 24.16 KG/M2 | SYSTOLIC BLOOD PRESSURE: 156 MMHG | DIASTOLIC BLOOD PRESSURE: 77 MMHG | HEART RATE: 66 BPM

## 2023-06-13 LAB
ANION GAP SERPL CALCULATED.3IONS-SCNC: 10 MMOL/L (ref 4–13)
BUN SERPL-MCNC: 16 MG/DL (ref 5–25)
CALCIUM SERPL-MCNC: 9.4 MG/DL (ref 8.4–10.2)
CHLORIDE SERPL-SCNC: 102 MMOL/L (ref 96–108)
CO2 SERPL-SCNC: 24 MMOL/L (ref 21–32)
CREAT SERPL-MCNC: 0.85 MG/DL (ref 0.6–1.3)
ERYTHROCYTE [DISTWIDTH] IN BLOOD BY AUTOMATED COUNT: 11.2 % (ref 11.6–15.1)
GFR SERPL CREATININE-BSD FRML MDRD: 82 ML/MIN/1.73SQ M
GLUCOSE SERPL-MCNC: 104 MG/DL (ref 65–140)
HCT VFR BLD AUTO: 39.9 % (ref 36.5–49.3)
HGB BLD-MCNC: 13.6 G/DL (ref 12–17)
MCH RBC QN AUTO: 34.9 PG (ref 26.8–34.3)
MCHC RBC AUTO-ENTMCNC: 34.1 G/DL (ref 31.4–37.4)
MCV RBC AUTO: 102 FL (ref 82–98)
PLATELET # BLD AUTO: 305 THOUSANDS/UL (ref 149–390)
PMV BLD AUTO: 8.8 FL (ref 8.9–12.7)
POTASSIUM SERPL-SCNC: 3.8 MMOL/L (ref 3.5–5.3)
RBC # BLD AUTO: 3.9 MILLION/UL (ref 3.88–5.62)
SODIUM SERPL-SCNC: 136 MMOL/L (ref 135–147)
WBC # BLD AUTO: 9.11 THOUSAND/UL (ref 4.31–10.16)

## 2023-06-13 PROCEDURE — 99233 SBSQ HOSP IP/OBS HIGH 50: CPT

## 2023-06-13 PROCEDURE — 97163 PT EVAL HIGH COMPLEX 45 MIN: CPT

## 2023-06-13 PROCEDURE — 80048 BASIC METABOLIC PNL TOTAL CA: CPT | Performed by: INTERNAL MEDICINE

## 2023-06-13 PROCEDURE — 99239 HOSP IP/OBS DSCHRG MGMT >30: CPT | Performed by: PHYSICIAN ASSISTANT

## 2023-06-13 PROCEDURE — 85027 COMPLETE CBC AUTOMATED: CPT | Performed by: INTERNAL MEDICINE

## 2023-06-13 RX ADMIN — LEVOTHYROXINE SODIUM 75 MCG: 75 TABLET ORAL at 05:21

## 2023-06-13 RX ADMIN — HEPARIN SODIUM 5000 UNITS: 5000 INJECTION INTRAVENOUS; SUBCUTANEOUS at 05:21

## 2023-06-13 RX ADMIN — LISINOPRIL 20 MG: 20 TABLET ORAL at 09:39

## 2023-06-13 RX ADMIN — FOLIC ACID 1 MG: 1 TABLET ORAL at 09:39

## 2023-06-13 RX ADMIN — HYDROCHLOROTHIAZIDE 25 MG: 25 TABLET ORAL at 09:39

## 2023-06-13 RX ADMIN — THIAMINE HCL TAB 100 MG 100 MG: 100 TAB at 09:40

## 2023-06-13 RX ADMIN — DOCUSATE SODIUM 100 MG: 100 CAPSULE, LIQUID FILLED ORAL at 09:40

## 2023-06-13 RX ADMIN — MULTIPLE VITAMINS W/ MINERALS TAB 1 TABLET: TAB ORAL at 09:39

## 2023-06-13 NOTE — PLAN OF CARE
Problem: Potential for Falls  Goal: Patient will remain free of falls  Description: INTERVENTIONS:  - Educate patient/family on patient safety including physical limitations  - Instruct patient to call for assistance with activity   - Consult OT/PT to assist with strengthening/mobility   - Keep Call bell within reach  - Keep bed low and locked with side rails adjusted as appropriate  - Keep care items and personal belongings within reach  - Initiate and maintain comfort rounds  - Make Fall Risk Sign visible to staff  - Offer Toileting every 2 Hours, in advance of need  - Initiate/Maintain bed alarm  - Obtain necessary fall risk management equipment: alarms  - Apply yellow socks and bracelet for high fall risk patients  - Consider moving patient to room near nurses station  Outcome: Completed     Problem: MOBILITY - ADULT  Goal: Maintain or return to baseline ADL function  Description: INTERVENTIONS:  -  Assess patient's ability to carry out ADLs; assess patient's baseline for ADL function and identify physical deficits which impact ability to perform ADLs (bathing, care of mouth/teeth, toileting, grooming, dressing, etc )  - Assess/evaluate cause of self-care deficits   - Assess range of motion  - Assess patient's mobility; develop plan if impaired  - Assess patient's need for assistive devices and provide as appropriate  - Encourage maximum independence but intervene and supervise when necessary  - Involve family in performance of ADLs  - Assess for home care needs following discharge   - Consider OT consult to assist with ADL evaluation and planning for discharge  - Provide patient education as appropriate  Outcome: Completed  Goal: Maintains/Returns to pre admission functional level  Description: INTERVENTIONS:  - Perform BMAT or MOVE assessment daily    - Set and communicate daily mobility goal to care team and patient/family/caregiver     - Collaborate with rehabilitation services on mobility goals if consulted  - Ambulate patient 3 times a day  - Out of bed to chair 3 times a day   - Out of bed for meals 3 times a day  - Out of bed for toileting  - Record patient progress and toleration of activity level   Outcome: Completed     Problem: PAIN - ADULT  Goal: Verbalizes/displays adequate comfort level or baseline comfort level  Description: Interventions:  - Encourage patient to monitor pain and request assistance  - Assess pain using appropriate pain scale  - Administer analgesics based on type and severity of pain and evaluate response  - Implement non-pharmacological measures as appropriate and evaluate response  - Consider cultural and social influences on pain and pain management  - Notify physician/advanced practitioner if interventions unsuccessful or patient reports new pain  Outcome: Completed     Problem: DISCHARGE PLANNING  Goal: Discharge to home or other facility with appropriate resources  Description: INTERVENTIONS:  - Identify barriers to discharge w/patient and caregiver  - Arrange for needed discharge resources and transportation as appropriate  - Identify discharge learning needs (meds, wound care, etc )  - Refer to Case Management Department for coordinating discharge planning if the patient needs post-hospital services based on physician/advanced practitioner order or complex needs related to functional status, cognitive ability, or social support system  Outcome: Completed     Problem: Knowledge Deficit  Goal: Patient/family/caregiver demonstrates understanding of disease process, treatment plan, medications, and discharge instructions  Description: Complete learning assessment and assess knowledge base    Interventions:  - Provide teaching at level of understanding  - Provide teaching via preferred learning methods  Outcome: Completed     Problem: CARDIOVASCULAR - ADULT  Goal: Maintains optimal cardiac output and hemodynamic stability  Description: INTERVENTIONS:  - Monitor I/O, vital signs and rhythm  - Monitor for S/S and trends of decreased cardiac output  - Administer and titrate ordered vasoactive medications to optimize hemodynamic stability  - Assess quality of pulses, skin color and temperature  - Assess for signs of decreased coronary artery perfusion  - Instruct patient to report change in severity of symptoms  Outcome: Completed  Goal: Absence of cardiac dysrhythmias or at baseline rhythm  Description: INTERVENTIONS:  - Continuous cardiac monitoring, vital signs, obtain 12 lead EKG if ordered  - Administer antiarrhythmic and heart rate control medications as ordered  - Monitor electrolytes and administer replacement therapy as ordered  Outcome: Completed

## 2023-06-13 NOTE — DISCHARGE INSTR - AVS FIRST PAGE
Dear Eber Dumont,     It was our pleasure to care for you here at [facility], [facility]  It is our hope that we were always able to exceed the expected standards for your care during your stay  You were hospitalized due to a fall   You were cared for on the 4th floor under the service of Jen Muñoz PA-C with the [facility] who covers for your primary care physician (PCP), Azael Merlos MD, while you were hospitalized  If you have any questions or concerns related to this hospitalization, you may contact us at [phone]  For follow up as well as medication refills, we recommend that you follow up with your primary care physician  A registered nurse will reach out to you by phone within a few days after your discharge to answer any additional questions that you may have after going home  However, at this time we provide for you here, the most important instructions / recommendations at discharge:     Notable Medication Adjustments -   Please DO NOT take your Xanax with wine/alchoholic beverages  Do not take tylenol PM as this can cause significant confusion, unsteadiness, difficulty walking  Take melatonin as needed to help with sleep  Testing Required after Discharge -   None  Incidental findings that Require Follow Up   None  Important Follow Up Information -   Please follow-up in the outpatient setting with your primary care provider within 1 week  Please follow-up in the outpatient setting with your neurologist, call [phone] to reschedule your appointment in November  Other Instructions -   None  Please review this entire after visit summary as additional general instructions including medication list, appointments, activity, diet, any pertinent wound care, and other additional recommendations from your care team that may be provided for you        Sincerely,     Jen Muñoz PA-C

## 2023-06-13 NOTE — PHYSICAL THERAPY NOTE
PHYSICAL THERAPY EVALUATION          Patient Name: Dami Lea  [id number] Date: 6/13/2023  PT EVALUATION    [de-identified] y o     [id number]    Elevated troponin [R77 8]  Generalized weakness [R53 1]  Relational problem due to medical condition [Z63 8]    Past Medical History:   Diagnosis Date    Abnormal weight loss     Anxiety     BPH (benign prostatic hyperplasia)     Bursitis of left hip     Cervical radiculopathy     Colon polyps     Dementia (HCC)     Depression     Disease of thyroid gland     Heart murmur     Hypertension     Nicotine dependence in remission     Non-toxic multinodular goiter     Psychiatric disorder      Past Surgical History:   Procedure Laterality Date    Cocke Dago Left     Dr Anahy Moreno; onset: 8/6/13    COLONOSCOPY      3/20/13, normal clon - Dr Viktor Mary, repeat in 5-10 years    HIP SURGERY      SC COLONOSCOPY FLX DX W/COLLJ [facility] WHEN PFRMD N/A 3/28/2018    Procedure: COLONOSCOPY;  Surgeon: Donavan Abreu MD;  Location: [facility]; Service: Colorectal    THYROIDECTOMY, PARTIAL      TONSILLECTOMY      TOTAL HIP ARTHROPLASTY          06/13/23 0901   PT Last Visit   PT Visit Date 06/13/23   Note Type   Note type Evaluation   Pain Assessment   Pain Assessment Tool 0-10   Pain Score No Pain   Restrictions/Precautions   Other Precautions Contact/isolation;Cognitive; Chair Alarm; Bed Alarm; Fall Risk   Home Living   Type of [address] [place] [address] One level;Stairs to enter with rails   Bathroom Shower/Tub Walk-in shower   [address] Walker;Cane   Additional Comments 5 PER per patient   Prior Function   Level of [place] Independent with ADLs; Independent with functional mobility; Independent with IADLS   Lives With Spouse; Son   [place] Help From Family   IADLs Independent with driving; Independent with meal prep; Independent with medication management   Falls in the last 6 months 1 to 4   Vocational Retired   Comments per patient, indep for ADLs and ambulation without an AD at home  never home alone   General   Additional Pertinent History pt admitted 6/10/23 for fall  activity as tolerated orders  PMHx significant for anxiety, dementia, ETOH use, cataracts, OA, OCD   Cognition   Overall Cognitive Status Impaired   Arousal/Participation Cooperative   Attention Attends with cues to redirect   Orientation Level Oriented X4   Memory Decreased recall of recent events   Following Commands Follows one step commands with increased time or repetition   Comments hx dementia  repetitive  limited insight   RLE Assessment   RLE Assessment WFL  (4/5)   LLE Assessment   LLE Assessment WFL  (4/5)   Coordination   Sensation WFL   Bed Mobility   Supine to Sit 6  Modified independent   Additional items HOB elevated; Increased time required   Sit to Supine 6  Modified independent   Additional items HOB elevated; Increased time required   Transfers   Sit to Stand 5  Supervision   Additional items Increased time required   Stand to Sit 5  Supervision   Additional items Increased time required   Ambulation/Elevation   Gait pattern Inconsistent elyse  (mildly unsteady)   Gait Assistance   (CGA w no AD; S w RW)   Additional items Assist x 1   Assistive Device None;Rolling walker   Distance 80' (27 w no AD, 48' w RW)   Balance   Static Standing Fair   Dynamic Standing Fair -   Ambulatory Fair -   Endurance Deficit   Endurance Deficit No   Activity Tolerance   Activity Tolerance Patient tolerated treatment well   Medical Staff Made Aware OT   Nurse Made Aware cleared for therapy   Assessment   Prognosis Good   Problem List Impaired balance;Decreased cognition; Impaired judgement;Decreased safety awareness   Assessment Pamela Ferraro is a [de-identified] y o  male admitted to [facility] on 6/10/2023 for Fall  PT was consulted and pt was seen on 6/13/2023 for mobility assessment and d/c planning  Pt presents w contact isolation, high fall risk   At baseline, per patient report, "pt is indep for ADLs, IADLs and ambulation without an AD  Pt is currently functioning at a modified independent assistance level for bed mobility, supervision assistance x1 level for transfers and ambulation w and without an AD  Pt demonstrated deficits of cognition, balance  Hx dementia but may benefit from further cognitive testing as pt reports he still drives, does his own medication  Did note gait deviations contributing to fall risk; ambulatory balance improved w use of RW  During session pt states he was agreeable to use walker upon dc acknowledging improved ease of mobility with use of DME  Current balance assessment including: FTEO= 30\" w mild sway, FTEC= 10\" (timing stopped dt stepping out of TOM)  Results of Romberg testing (FTEC) indicate pt is at risk for falls  Pt will benefit from continued skilled IP PT to address the above mentioned impairments  in order to maximize recovery and increase functional independence when completing mobility and ADLs  At this time PT recommendations for d/c are home w HH v OP PT pending pt ability to drive  Barriers to Discharge None   Goals   Patient Goals go home   STG Expiration Date 06/23/23   Short Term Goal #1 1)  Pt will perform bed mobility indep demonstrating appropriate technique 100% of the time in order to improve function  2)  Perform all transfers with Maurice demonstrating safe and appropriate technique 100% of the time in order to improve ability to negotiate safely in home environment  3) Amb with least restrictive AD > 150'x1 with mod I in order to demonstrate ability to negotiate in home environment  4)  Improve overall strength and balance 1/2 grade in order to optimize ability to perform functional tasks and reduce fall risk  5) Increase activity tolerance to 45 minutes in order to improve endurance to functional tasks  6)  Negotiate stairs using most appropriate technique and S in order to be able to negotiate safely in home environment   7) PT for ongoing " patient and family/caregiver education, DME needs and d/c planning in order to promote highest level of function in least restrictive environment  Plan   Treatment/Interventions Functional transfer training;LE strengthening/ROM; Elevations; Therapeutic exercise;Cognitive reorientation;Patient/family training;Equipment eval/education; Bed mobility;Gait training; Compensatory technique education;Continued evaluation;Spoke to nursing;OT   PT Frequency 3-5x/wk   Recommendation   PT Discharge Recommendation (S)  Home with outpatient rehabilitation  (vs HHPT pending pt ability to drive?   would recommend fitness to drive test)   AM-PAC Basic Mobility Inpatient   Turning in Flat Bed Without Bedrails 4   Lying on Back to Sitting on Edge of Flat Bed Without Bedrails 4   Moving Bed to Chair 3   Standing Up From Chair Using Arms 3   Walk in Room 3   Climb 3-5 Stairs With Railing 3   Basic Mobility Inpatient Raw Score 20   Basic Mobility Standardized Score 43 99   Highest Level Of Mobility   JH-HLM Goal 6: Walk 10 steps or more   JH-HLM Achieved 7: Walk 25 feet or more   End of Consult   Patient Position at End of Consult Supine; All needs within reach;Bed/Chair alarm activated   History: co - morbidities, fall risk, cognition, multiple lines (masimo), contact isolation  Exam: impairments in systems including musculoskeletal (strength), neuromuscular (balance, gait, transfers, motor function and sensation), am-pac, cognition  Clinical: unstable/unpredictable  Complexity:high      Mesfin Falling, PT

## 2023-06-13 NOTE — ASSESSMENT & PLAN NOTE
"Pt notes ambulatory dysfunction  · Has recent PT with initial improvement, but has not continued with exercises  · Ambulates without assistive device  · CT brain revealed: \"Mild progressive ventriculomegaly without evidence of obstructive hydrocephalus  Findings may be secondary to progressive central volume loss   Findings are equivocal for normal pressure hydrocephalus\"  · Patient will follow-up in the outpatient setting with neurology  · PT recommending home with home health    "

## 2023-06-13 NOTE — PLAN OF CARE
Problem: Potential for Falls  Goal: Patient will remain free of falls  Description: INTERVENTIONS:  - Educate patient/family on patient safety including physical limitations  - Instruct patient to call for assistance with activity   - Consult OT/PT to assist with strengthening/mobility   - Keep Call bell within reach  - Keep bed low and locked with side rails adjusted as appropriate  - Keep care items and personal belongings within reach  - Initiate and maintain comfort rounds  - Make Fall Risk Sign visible to staff  - Offer Toileting every 2 Hours, in advance of need  - Initiate/Maintain bed alarm  - Obtain necessary fall risk management equipment: alarms  - Apply yellow socks and bracelet for high fall risk patients  - Consider moving patient to room near nurses station  Outcome: Progressing     Problem: MOBILITY - ADULT  Goal: Maintain or return to baseline ADL function  Description: INTERVENTIONS:  -  Assess patient's ability to carry out ADLs; assess patient's baseline for ADL function and identify physical deficits which impact ability to perform ADLs (bathing, care of mouth/teeth, toileting, grooming, dressing, etc )  - Assess/evaluate cause of self-care deficits   - Assess range of motion  - Assess patient's mobility; develop plan if impaired  - Assess patient's need for assistive devices and provide as appropriate  - Encourage maximum independence but intervene and supervise when necessary  - Involve family in performance of ADLs  - Assess for home care needs following discharge   - Consider OT consult to assist with ADL evaluation and planning for discharge  - Provide patient education as appropriate  Outcome: Progressing  Goal: Maintains/Returns to pre admission functional level  Description: INTERVENTIONS:  - Perform BMAT or MOVE assessment daily    - Set and communicate daily mobility goal to care team and patient/family/caregiver     - Collaborate with rehabilitation services on mobility goals if consulted  - Ambulate patient 3 times a day  - Out of bed to chair 3 times a day   - Out of bed for meals 3 times a day  - Out of bed for toileting  - Record patient progress and toleration of activity level   Outcome: Progressing     Problem: PAIN - ADULT  Goal: Verbalizes/displays adequate comfort level or baseline comfort level  Description: Interventions:  - Encourage patient to monitor pain and request assistance  - Assess pain using appropriate pain scale  - Administer analgesics based on type and severity of pain and evaluate response  - Implement non-pharmacological measures as appropriate and evaluate response  - Consider cultural and social influences on pain and pain management  - Notify physician/advanced practitioner if interventions unsuccessful or patient reports new pain  Outcome: Progressing     Problem: DISCHARGE PLANNING  Goal: Discharge to home or other facility with appropriate resources  Description: INTERVENTIONS:  - Identify barriers to discharge w/patient and caregiver  - Arrange for needed discharge resources and transportation as appropriate  - Identify discharge learning needs (meds, wound care, etc )  - Refer to Case Management Department for coordinating discharge planning if the patient needs post-hospital services based on physician/advanced practitioner order or complex needs related to functional status, cognitive ability, or social support system  Outcome: Progressing     Problem: Knowledge Deficit  Goal: Patient/family/caregiver demonstrates understanding of disease process, treatment plan, medications, and discharge instructions  Description: Complete learning assessment and assess knowledge base    Interventions:  - Provide teaching at level of understanding  - Provide teaching via preferred learning methods  Outcome: Progressing

## 2023-06-13 NOTE — CASE MANAGEMENT
Case Management Discharge Planning Note    Patient name Darin Green  Location [facility] 4 /E4 MS 26-* MRN [id number]  : 1945 Date 2023       Current Admission Date: 6/10/2023  Current Admission Diagnosis:Fall   Patient Active Problem List    Diagnosis Date Noted   • Fall 06/10/2023   • Aortic stenosis, moderate 06/10/2023   • Troponin I above reference range 06/10/2023   • Ambulatory dysfunction 06/10/2023   • Insomnia 2023   • Osteochondroma of femur 2022   • Primary localized osteoarthritis of pelvic region and thigh 2022   • Alcohol use 2022   • Essential hypertension 2020   • Mild dementia ([address] ) 10/15/2020   • Chronic intractable headache 2020   • BMI 25 0-25 9,adult 2019   • PROVISIONAL Obsessive compulsive personality disorder 2018   • Marital dysfunction 2018   • Memory loss 2015   • Hearing loss 2014   • Cataract 2013   • Tension type headache 2012   • Macrocytosis 2012   • Primary localized osteoarthrosis of the hip 2012   • Benign prostatic hyperplasia 2012   • Erectile dysfunction of non-organic origin 2012   • Hyperlipidemia 2012   • Impaired fasting glucose 2012   • Osteoarthritis 2012   • Anxiety 2012   • Hypothyroidism 2012      LOS (days): 3  Geometric Mean LOS (GMLOS) (days): 3 80  Days to GMLOS:0 7     OBJECTIVE:  Risk of Unplanned Readmission Score: 8 77         Current admission status: Inpatient   Preferred Pharmacy:   [facility] [id number] [address]  Phone: [phone] Fax: [phone] [address]  Phone: [phone] Fax: [phone]    Primary Care Provider: Ifeanyi Navarrete MD    Primary Insurance: [address] [place] [address]  Secondary Insurance:     DISCHARGE DETAILS:       Additional Comments: [facility]  Phone: [phone]  Fax: [phone]  AVS faxed to [address]

## 2023-06-13 NOTE — ASSESSMENT & PLAN NOTE
Patient is an 59-year-old male with past medical history significant for hypertension, hypothyroidism, mild dementia who presents to the ER for evaluation of a fall  · Patient's wife notes this is his second fall in the last few months  · He notes at baseline he has an unsteady gait, but does not ambulate with a walker  · Patient had a CT scan of his brain without any acute abnormalities  Noted to have abrasions on both knees from crawling however no other signs or symptoms of trauma    Full range of motion of extremities without pain  · Fall precautions  · PT/OT recommending home with home health  · Geriatrics consulted, full medication review performed  · Recommend additional assistance at home  · Will need more oversight on his medications  · Suspect his fall likely in the setting of taking Xanax, Tylenol PM, and wine prior to bed

## 2023-06-13 NOTE — NURSING NOTE
Patient discharged, stable at this time  IV removed, AVS reviewed  No further questions at this time  No belongings left in room  No papers left in chart

## 2023-06-13 NOTE — PLAN OF CARE
"  Problem: PHYSICAL THERAPY ADULT  Goal: Performs mobility at highest level of function for planned discharge setting  See evaluation for individualized goals  Description: Treatment/Interventions: Functional transfer training, LE strengthening/ROM, Elevations, Therapeutic exercise, Cognitive reorientation, Patient/family training, Equipment eval/education, Bed mobility, Gait training, Compensatory technique education, Continued evaluation, Spoke to nursing, OT          See flowsheet documentation for full assessment, interventions and recommendations  Note: Prognosis: Good  Problem List: Impaired balance, Decreased cognition, Impaired judgement, Decreased safety awareness  Assessment: Angel Brizuela is a [de-identified] y o  male admitted to [facility] on 6/10/2023 for Fall  PT was consulted and pt was seen on 6/13/2023 for mobility assessment and d/c planning  Pt presents w contact isolation, high fall risk  At baseline, per patient report, pt is indep for ADLs, IADLs and ambulation without an AD  Pt is currently functioning at a modified independent assistance level for bed mobility, supervision assistance x1 level for transfers and ambulation w and without an AD  Pt demonstrated deficits of cognition, balance  Hx dementia but may benefit from further cognitive testing as pt reports he still drives, does his own medication  Did note gait deviations contributing to fall risk; ambulatory balance improved w use of RW  During session pt states he was agreeable to use walker upon dc acknowledging improved ease of mobility with use of DME  Current balance assessment including: FTEO= 30\" w mild sway, FTEC= 10\" (timing stopped dt stepping out of TOM)  Results of Romberg testing (FTEC) indicate pt is at risk for falls  Pt will benefit from continued skilled IP PT to address the above mentioned impairments  in order to maximize recovery and increase functional independence when completing mobility and ADLs   At this time PT recommendations " for d/c are home w [place] v OP PT pending pt ability to drive  Barriers to Discharge: None     PT Discharge Recommendation: (S) Home with outpatient rehabilitation (vs HHPT pending pt ability to drive?   would recommend fitness to drive test)    See flowsheet documentation for full assessment

## 2023-06-13 NOTE — ASSESSMENT & PLAN NOTE
"Patient has a history of anxiety  · Is prescribed Prozac 80 mg daily as well as Xanax 0 5 mg at bedtime  · Denies taking Prozac daily, states that he \"takes it sometimes once a week or once a month\"  · PDMP website was queried, no red flags  "

## 2023-06-13 NOTE — ASSESSMENT & PLAN NOTE
Patient was seen by neurology in the past and diagnosed with dementia  · He was started on Aricept 10 mg daily which his wife discontinued approximately 1 month ago due to concerns of a personality change  · Patient's home nocturnal regimen of Xanax 0 5 mg, a large glass of wine, Tylenol arthritis plus Tylenol PM is not optimal in the geriatric population  · He also notes worsening gait and ambulatory dysfunction  · TSH, folate normal  · He will need outpatient follow-up with neurology as they are continuing to fill the Aricept and send it to his house via [address]

## 2023-06-13 NOTE — PLAN OF CARE
Problem: Potential for Falls  Goal: Patient will remain free of falls  Description: INTERVENTIONS:  - Educate patient/family on patient safety including physical limitations  - Instruct patient to call for assistance with activity   - Consult OT/PT to assist with strengthening/mobility   - Keep Call bell within reach  - Keep bed low and locked with side rails adjusted as appropriate  - Keep care items and personal belongings within reach  - Initiate and maintain comfort rounds  - Make Fall Risk Sign visible to staff  - Offer Toileting every 2 Hours, in advance of need  - Initiate/Maintain bed alarm  - Obtain necessary fall risk management equipment: alarms  - Apply yellow socks and bracelet for high fall risk patients  - Consider moving patient to room near nurses station  6/12/2023 2236 by Santos Dunbar LPN  Outcome: Progressing  6/12/2023 2236 by Santos Dubnar LPN  Outcome: Progressing     Problem: MOBILITY - ADULT  Goal: Maintain or return to baseline ADL function  Description: INTERVENTIONS:  -  Assess patient's ability to carry out ADLs; assess patient's baseline for ADL function and identify physical deficits which impact ability to perform ADLs (bathing, care of mouth/teeth, toileting, grooming, dressing, etc )  - Assess/evaluate cause of self-care deficits   - Assess range of motion  - Assess patient's mobility; develop plan if impaired  - Assess patient's need for assistive devices and provide as appropriate  - Encourage maximum independence but intervene and supervise when necessary  - Involve family in performance of ADLs  - Assess for home care needs following discharge   - Consider OT consult to assist with ADL evaluation and planning for discharge  - Provide patient education as appropriate  6/12/2023 2236 by Santos Dunbar LPN  Outcome: Progressing  6/12/2023 2236 by Santos Dunbar LPN  Outcome: Progressing  Goal: Maintains/Returns to pre admission functional level  Description: INTERVENTIONS:  - Perform BMAT or MOVE assessment daily    - Set and communicate daily mobility goal to care team and patient/family/caregiver     - Collaborate with rehabilitation services on mobility goals if consulted  - Ambulate patient 3 times a day  - Out of bed to chair 3 times a day   - Out of bed for meals 3 times a day  - Out of bed for toileting  - Record patient progress and toleration of activity level   6/12/2023 2236 by Charmaine Card LPN  Outcome: Progressing  6/12/2023 2236 by Charmaine Card LPN  Outcome: Progressing     Problem: PAIN - ADULT  Goal: Verbalizes/displays adequate comfort level or baseline comfort level  Description: Interventions:  - Encourage patient to monitor pain and request assistance  - Assess pain using appropriate pain scale  - Administer analgesics based on type and severity of pain and evaluate response  - Implement non-pharmacological measures as appropriate and evaluate response  - Consider cultural and social influences on pain and pain management  - Notify physician/advanced practitioner if interventions unsuccessful or patient reports new pain  6/12/2023 2236 by Charmaine Card LPN  Outcome: Progressing  6/12/2023 2236 by Charmaine Card LPN  Outcome: Progressing     Problem: DISCHARGE PLANNING  Goal: Discharge to home or other facility with appropriate resources  Description: INTERVENTIONS:  - Identify barriers to discharge w/patient and caregiver  - Arrange for needed discharge resources and transportation as appropriate  - Identify discharge learning needs (meds, wound care, etc )  - Refer to Case Management Department for coordinating discharge planning if the patient needs post-hospital services based on physician/advanced practitioner order or complex needs related to functional status, cognitive ability, or social support system  6/12/2023 2236 by Charmaine Card LPN  Outcome: Progressing  6/12/2023 2236 by Charmaine Card LPN  Outcome: Progressing     Problem: Knowledge Deficit  Goal: Patient/family/caregiver demonstrates understanding of disease process, treatment plan, medications, and discharge instructions  Description: Complete learning assessment and assess knowledge base    Interventions:  - Provide teaching at level of understanding  - Provide teaching via preferred learning methods  6/12/2023 2236 by Bettina Gambino LPN  Outcome: Progressing  6/12/2023 2236 by Bettina Gambino LPN  Outcome: Progressing

## 2023-06-13 NOTE — PROGRESS NOTES
Progress Note - Geriatric Medicine   Eber Pae [de-identified] y o  male MRN: [id number]  Unit/Bed#: E4 -01 Encounter: [id number]      Assessment/Plan:    Mild Dementia  · Patient has been following with neurology in the outpatient setting since 2015  · Patient for started noticing deficits in 2011 after he retired and notes that cognition has slowly and progressively worsened  · He was noted to have difficulty with short-term and long-term memory  · He was also noted to have some behavioral issues including being racist and possessive (per neurology note)  · Most recent visit with neurology on 11/16/2022 noted the following  · Symptoms appear to be stable  · Patient scored a 21/30 on MoCA  · Continue donepezil 10 mg daily  · Patient remains on Prozac for OCD and Xanax as needed  · He is managing his own medication  · Admits to forgetting things of instructed to perform multiple tasks at one time  · Continues to drive without accidents or difficulty  · Patient's wife notes that the patient is no longer taking the donepezil as she feels it has made his memory worse  · Contacted the pharmacy yesterday and they note they are still dispensing this medication so it is unclear if the patient is taking this as he manages his own medication  · Discussed with patient's wife today and she notes that she has been taking this out of his pharmacy bag when he receives his medications in the mail  · Patient's wife notes that patient is stubborn and ritualistic at baseline  · Would recommend sooner follow-up with Neurology on discharge to follow-up on dementia (patient should not be managing medication independently), review medication patient is taking (as patients wife has taken him off Donepezil), and discuss any other acute concerns (including driving concerns)  · Discussed with patients wife that she will need to more closely monitor medications and she should be going to follow-up visits with him which she is agreeable to · Most recent TSH on 6/10/2023 noted to be 2 172  · Most recent vitamin B12 level on 6/10/2023 noted to be 941  · CT of the head on 6/10/2023 revealed no intracranial hemorrhage or significant white matter disease  · Patient is alert and oriented x4 on exam today  · He can be repetitive at times  · Maintain delirium precautions as discussed below  · Redirect reorient as needed  · Keep physically, mentally, and socially active    Delirium   • Current mentation: alert and oriented x 4 but forgetful at times  • Patient is at high risk secondary to age, mild dementia, fall, baseline alcohol use (he is being monitored for withdrawal), medication administration (patient is not taking medication correctly at home, however, those medications are ordered here as prescribed), impaired vision, impaired hearing, sleep disturbance, and hospitalization  • Maintain delirium precautions   · Provide redirection, reorientation, and distraction techniques  · Maintain fall and safety precautions   · Assist with ADLs/IADLs  · Avoid deliriogenic medications such as tramadol, benzodiazepines, anticholinergics, benadryl  · Treat pain using geriatric pain protocol   · Encourage oral hydration and nutrition   · Monitor for constipation and urinary retention   · Implement sleep hygiene and limit night time interuptions   · Maintain sleep-wake cycle   · Encourage early and frequent mobilization   · Encourage participation in group activities  • Most recent EKG on 6/10/2023 revealed a QTc interval of 408  · If all other interventions are unsuccessful for acute agitation and behaviors, can consider Zyprexa 2 5 mg IM Q 8 hours prn  • Would avoid benzodiazepines such as Ativan as these can worsen delirium     Deconditioning   • Patient is at increased risk for deconditioning secondary to mild dementia, weakness, gait dysfunction, alcohol use at home, and hospitalization  • Continue to optimize diet, hydration, and mobility for healing   · GFR 82 on labs today   · Keep hydrated  • Monitor for signs and symptoms of infection, dehydration, DVT, and skin breakdown    Frailty   Clinical Frail Scale: 5- Mildly Frail  · More evident slowing, needs help high order IADLs (transport, bills, medications)  · Progressively impairs shopping and walking outside alone, meal prep and housework  • Most recent albumin on 11/7/2019 noted to be 4 1  · Consider rechecking as patient's wife notes his appetite has been decreasing and he has been losing weight  · Discussed using Ensure or boost at home for additional protein  • Consider nutrition consult  • Encourage protein supplementation     Ambulatory Dysfunction/Falls  • Patient notes no other recent falls prior to this admission  • He denies ambulating with any assistive devices at baseline  • Patient's wife notes increased weakness at baseline  · She notes that he primarily does the grocery shopping but recently has not been able to carry the groceries into the house  · The patient has received physical therapy in the past  · The patient's wife notes that the patient is not very active at baseline  · Suspect the following is also contributing to falls  · Xanax use with alcohol at bedtime  · Tylenol PM use with the Xanax and alcohol for sleep  · Unclear if patient is administering medication appropriately at baseline  · PT/OT consulted to assist with strengthening/mobility and assist with discharge planning to appropriate level of care  · Patient currently being recommended for home therapy  • Assess patient frequently for physical needs, encourage use of assistant devices as needed and directed by PT/OT  • Identify cognitive and physical deficits and behaviors that affect risk of falls  • Consider moving patient closer to nursing station to monitor more closely for impulsive behavior which may increase risk of falls  • [place] fall precautions   • Educate patient/family on patient safety including physical limitations and importance of using call bell for assistance   • Modify environment to reduce risk of injury including disconnecting from pole when not in use, ensuring adequate lighting in room and restroom, ensuring that path to restroom is clear and free of trip hazards  • Out of bed as tolerated     Impaired Vision   • Patient does have vision impairment   · He does wear glasses at baseline  • Recommend use of corrective lenses at all appropriate times  • Encourage adequate lighting and encourage use of assistance with ambulation  • Keep personal belongings close to avoid reaching  • Encourage appropriate footwear at all times  • Recommend large font for printed materials provided to patient    Impaired Hearing   • Patient does have some hearing impairment  · Per patient's wife, the patient has been evaluated by audiology and he was recommended for hearing aids but she declined  · Hearing impairment strongly correlated with depression, cognitive impairment, delirium and falls in the older adult  · Use sound amplifier as needed  · Speak face to face  · Use clear dictation and enunciation of words    Dentition/Appetite   • Patient does not wear dentures  • Patient notes that he has a good appetite at baseline  • Patient's wife notes that he has had a decreased appetite and weight loss  · States that he eats breakfast and dinner  • He denies snacking throughout the day  • Discussed Ensure or boost at home for additional protein supplementation  • Encourage use of dentures at all appropriate times  • Ensure meal consistency is appropriate for all abilities   • Consider nutrition consult   • Continue aspiration precautions     Elimination   • Patient is continent of bowel and bladder at baseline  • Patient denies voiding difficulties or difficulty with bowel movements  • He states that he has only had 1 bowel movement since admission  · Wife notes that he is not eating as much care as he is not allowed to get up and ambulate to the bathroom independently  · Last documented bowel movement on 6/10  · Current bowel regimen includes  · Colace 100 mg BID  • Monitor for constipation and urinary retention     Insomnia   • Patient admits to a history of insomnia  • He states that he takes Xanax at bedtime  • Patient also admits to drinking wine before bed to help him sleep  • Patient's wife notes that he also buys Tylenol PM at the store for sleep  · She notes that he takes 1 pill with the Xanax and wine and then will take an additional pill if he is not sleeping after 1 hour  · Patient notes that he does get up during the night to use the bathroom but is typically able to get back to sleep  · Wife notes that he does have a routine the first time he gets up to turn on all the lights in the home before going back to sleep  · Patient notes that he has not been sleeping well here  · He is requesting a sleeping pill for bedtime  · Discussed why sleeping pills are not typically ordered or administered in the hospital and are not recommended in the geriatric population  · Did discuss the use of melatonin and can order if the patient is not discharging today  · Xanax is ordered for bedtime which he has been receiving   · First line is behavioral therapy   · Avoid sedative hypnotics including benzodiazepines and benadryl  · Encourage staying awake during the day   · Encourage daytime activities and morning exercise   · Decrease or eliminate daytime naps   · Avoid caffeine especially during late afternoon and evening hours  · Establish a nighttime routine  · Implement sleep hygiene and limit nighttime interruptions    Anxiety/Depression/Osessive Compulsive Personality Disorder   · Patient does have a history of anxiety and obsessive-compulsive personality disorder  · Per pharmacy, patient is prescribed Prozac 80 mg daily  · Patient initially admitted to taking this medication daily  · He noted that he felt he could come off this medication as he had been doing "well at home and was not feeling depressed  · Contacted patient's pharmacy yesterday and they indicated the last time this was dispensed was July 2022  · Wife believes that the patient is still receiving this medication from [facility] and patient notes the same  · When asked how much the patient is taking he states to his wife \"I am not taking as much as you think I am\"  · He later admitted to taking his once weekly or monthly and then stated he was not taking this at all  · Patient had been on Prozac while inpatient but this was discontinued  · Mood appears stable on exam  · Continue supportive care    Medication Reconciliation  · Patient states that he is managing his medication independently at home  · He notes that he has his medication memorized and does not have a paper with any information written on pertaining to his medication  · Wife notes that he has a pill container that he manages weekly  · Patient states that he strictly keeps vitamins in that container  · Contacted patient's pharmacy is to obtain current medication list (please see consult note from 6/12 for more detailed information)  · Discussed the patient's medication list with him and his wife after contacting his pharmacies and noted the following  · Patient has an active prescription for trazodone but this has not been picked up since January 2023  · Wife notes that this medication was not helping with sleep so he stopped taking this  · He is still receiving donepezil from [facility] despite the fact that his wife notes he has not been taking this  · She notes that he has been off this for some time as his cognitive test improved off of this medication  · She felt that his cognition was worse on this medication  · It does not appear that neurology has been notified as they indicated in their last note to continue this medication and he is still receiving this medication from the mail delivery pharmacy  · It had been unclear if the " "patient was taking this medication as the pharmacy was still delivering, however, the patient's wife notes that she removes this medication from his pharmacy bag when it is delivered to the house to ensure he is not taking this  · Patient prescribed Prozac 80 mg daily  · He initially admitted to taking this at the pharmacy notes that the last dispensed this in July 2022  · The patient noted he is still receiving this from the pharmacy but [facility] has not delivered this and [facility] did not have this on file  · Patient then admitted \"I am not taking as much as you think I am\"  · When asked how much he is taking he states he takes it weekly or monthly  · And asked the last time he took this medication he states he has not been taking it  · Patient's wife notes that she had been taking this as well but this was discontinued  · She admits that her dose was different so is unclear what dose he is or is not taking  · I am very concerned about the patient managing his own medication and I discussed this with the primary team and the patient's wife  · I am not convinced that he knows exactly what he is eating and when he is supposed to be taking it turn for incorrect administration medication  · Reviewed with patient's wife today with the primary team that she will need to provide more oversight with respect to the patient's medications  · Discussed obtaining a locked medication box for medication to give the patient autonomy but also allow the wife to have more oversight to his medication   · I will provide her with information on several different types that she can purchase online  · The primary team and myself did speak with case management and they are working to see if VNA can come into the home to focus on medication administration and ensure that the patient is taking what is ordered and as it is prescribed  · Again would recommend sooner follow-up with neurology on discharge as well as follow-up with his " PCP  · Discussed with patient's wife today and recommend that she attend all doctor visits to ensure that the appropriate information is being provided and so that she is aware what is going on    Alcohol Use  · Patient notes that he drinks 1 glass of wine per night  · Wife notes that the patient drinks 12 to 14 ounces of wine per night to help with sleep  · He notes that he does take this with Xanax as well as Tylenol PM  · Discussed concerns regarding the patient drinking and taking these medications together  · Patient remains on CIWA protocol while inpatient  · He is ordered thiamine, folic acid, and multivitamin while inpatient  · Would continue to educate on alcohol cessation and concurrent use with benzodiazepines and Tylenol PM        Subjective: The patient is being seen and evaluated today at the bedside for geriatric follow-up  He is noted to be lying in bed comfortably no acute distress  He is again asking if he can be discharged home today  He is alert and oriented x4 on exam today  He is currently denying pain  He denies dizziness, headaches, and acute vision changes  He denies chest pain and shortness of breath  He denies nausea, vomiting, constipation, and diarrhea  He notes that he has not been eating or sleeping well here  He is requesting a sleeping pill if he is to stay here another night  Care was coordinated with patients nurse Adriel Villa  She notes no acute issues or events overnight  Care was also coordinated with Sara Fields PA-C and [facility] with case management  [facility] is working on getting resources for the patient including VNA and home therapy for discharge  Review of Systems   Constitutional: Positive for appetite change  Negative for activity change, chills, fatigue and fever  HENT: Positive for hearing loss  Eyes: Positive for visual disturbance (glasses)  Respiratory: Negative for cough and shortness of breath      Cardiovascular: Negative for chest pain "and leg swelling  Gastrointestinal: Negative for abdominal distention, abdominal pain, constipation, diarrhea, nausea and vomiting  Genitourinary: Negative for difficulty urinating and dysuria  Musculoskeletal: Positive for gait problem  Negative for arthralgias and myalgias  Skin: Negative for color change and pallor  Neurological: Positive for weakness  Negative for dizziness and headaches  Psychiatric/Behavioral: Positive for sleep disturbance  Negative for behavioral problems and confusion  The patient is not nervous/anxious  Objective:     Vitals: Blood pressure 156/77, pulse 66, temperature (!) 97 3 °F (36 3 °C), temperature source Temporal, resp  rate 18, height 5' 8\" (1 727 m), weight 72 3 kg (159 lb 6 3 oz), SpO2 94 %  ,Body mass index is 24 24 kg/m²  Intake/Output Summary (Last 24 hours) at 6/13/2023 1256  Last data filed at 6/13/2023 0601  Gross per 24 hour   Intake 580 ml   Output 200 ml   Net 380 ml       Current Medications: Reviewed    Physical Exam:   Physical Exam  Vitals and nursing note reviewed  Constitutional:       General: He is not in acute distress  Appearance: Normal appearance  He is not ill-appearing  HENT:      Head: Normocephalic  Mouth/Throat:      Mouth: Mucous membranes are dry  Eyes:      General: No scleral icterus  Conjunctiva/sclera: Conjunctivae normal    Cardiovascular:      Rate and Rhythm: Normal rate and regular rhythm  Pulmonary:      Effort: Pulmonary effort is normal  No respiratory distress  Abdominal:      General: Bowel sounds are normal  There is no distension  Palpations: Abdomen is soft  Tenderness: There is no abdominal tenderness  Musculoskeletal:         General: No swelling or tenderness  Skin:     General: Skin is warm and dry  Neurological:      General: No focal deficit present  Mental Status: He is alert and oriented to person, place, and time  Mental status is at baseline        Motor: " "Weakness present  Gait: Gait abnormal       Comments: Forgetful and repetitive at times          Invasive Devices     Peripheral Intravenous Line  Duration           Peripheral IV 06/11/23 Left;Proximal;Ventral (anterior) Forearm 2 days                Lab, Imaging and other studies: I have personally reviewed pertinent reports  Please note:  Voice-recognition software may have been used in the preparation of this document  Occasional wrong word or \"sound-alike\" substitutions may have occurred due to the inherent limitations of voice recognition software  Interpretation should be guided by context      "

## 2023-06-13 NOTE — CASE MANAGEMENT
Case Management Discharge Planning Note    Patient name Angel Brizuela  [facility] 4 /E4 MS 26-* MRN [id number]  : 1945 Date 2023       Current Admission Date: 6/10/2023  Current Admission Diagnosis:Fall   Patient Active Problem List    Diagnosis Date Noted   • Fall 06/10/2023   • Aortic stenosis, moderate 06/10/2023   • Troponin I above reference range 06/10/2023   • Ambulatory dysfunction 06/10/2023   • Insomnia 2023   • Osteochondroma of femur 2022   • Primary localized osteoarthritis of pelvic region and thigh 2022   • Alcohol use 2022   • Essential hypertension 2020   • Mild dementia ([facility] [address] ) 10/15/2020   • Chronic intractable headache 2020   • BMI 25 0-25 9,adult 2019   • PROVISIONAL Obsessive compulsive personality disorder 2018   • Marital dysfunction 2018   • Memory loss 2015   • Hearing loss 2014   • Cataract 2013   • Tension type headache 2012   • Macrocytosis 2012   • Primary localized osteoarthrosis of the hip 2012   • Benign prostatic hyperplasia 2012   • Erectile dysfunction of non-organic origin 2012   • Hyperlipidemia 2012   • Impaired fasting glucose 2012   • Osteoarthritis 2012   • Anxiety 2012   • Hypothyroidism 2012      LOS (days): 3  Geometric Mean LOS (GMLOS) (days): 2 90  Days to GMLOS:-0 2     OBJECTIVE:  Risk of Unplanned Readmission Score: 8 85         Current admission status: Inpatient   Preferred Pharmacy:   [facility] [address] [address]  Phone: [phone] Fax: [phone] [facility] [place] [address]  Phone: [phone] Fax: [phone]    Primary Care Provider: Reyes Kerns MD    Primary Insurance: [address] [place] [address]  Secondary Insurance:     DISCHARGE DETAILS:       Requested [address]         Is the patient interested in Amanda Guzmán at discharge?: Yes  [address] requested[de-identified] Nursing, Medical Social Work, Occupational Therapy, Physical R [address] Agency Name[de-identified] Other (pending an accepting agency at this time)  [address] Provider[de-identified] PCP  Home Health Services Needed[de-identified] Evaluate Functional Status and Safety, Gait/ADL Training, Strengthening/Theraputic Exercises to Improve Function, Other (comment)  Homebound Criteria Met[de-identified] Requires the Assistance of Another Person for Safe Ambulation or to Leave the Home  Supporting Clincal Findings[de-identified] Limited Endurance, Fatigues Easliy in [employer]    DME Referral Provided  Referral made for DME?: No    Other Referral/Resources/Interventions Provided:  Interventions: [address]    Treatment Team Recommendation: Home with [address]  Discharge Destination Plan[de-identified] Home with Natalioveralesa at Discharge : Family     ETA of Transport (Date): 06/13/23     Additional Comments: Patient needs home nursing, social work and therapy   Family prefers home therapy instead of outpatient

## 2023-06-13 NOTE — DISCHARGE SUMMARY
"[address]  Discharge- Sonya Cancel 1945, [de-identified] y o  male MRN: [id number]  Unit/Bed#: E4 -01 Encounter: [id number]  Primary Care Provider: Moustapha Cruz MD   Date and time admitted to hospital: 6/10/2023  8:59 AM    * Fall  Assessment & Plan  Patient is an 47-year-old male with past medical history significant for hypertension, hypothyroidism, mild dementia who presents to the ER for evaluation of a fall  · Patient's wife notes this is his second fall in the last few months  · He notes at baseline he has an unsteady gait, but does not ambulate with a walker  · Patient had a CT scan of his brain without any acute abnormalities  Noted to have abrasions on both knees from crawling however no other signs or symptoms of trauma  Full range of motion of extremities without pain  · Fall precautions  · PT/OT recommending home with home health  · Geriatrics consulted, full medication review performed  · Recommend additional assistance at home  · Will need more oversight on his medications  · Suspect his fall likely in the setting of taking Xanax, Tylenol PM, and wine prior to bed    Ambulatory dysfunction  Assessment & Plan  Pt notes ambulatory dysfunction  · Has recent PT with initial improvement, but has not continued with exercises  · Ambulates without assistive device  · CT brain revealed: \"Mild progressive ventriculomegaly without evidence of obstructive hydrocephalus  Findings may be secondary to progressive central volume loss   Findings are equivocal for normal pressure hydrocephalus\"  · Patient will follow-up in the outpatient setting with neurology  · PT recommending home with home health      Troponin I above reference range  Assessment & Plan  · Patient presented with a positive troponin of 66 to 76 to 69  · Initial EKG with T wave inversion in aVL: No previous EKG available for comparison  · He denies any cardiac symptoms    Aortic stenosis, moderate  Assessment " "& Plan  Patient was recently diagnosed with heart murmur as an outpatient and echocardiogram ordered  follow-up echocardiogram as an inpatient, due to fall, and positive troponin  · We will evaluate for aortic stenosis: However patient denies any syncope or symptoms concerning for symptomatic severe aortic stenosis  · ECHO with normal ejection fraction, grade 1 diastolic dysfunction with moderate aortic stenosis    Alcohol use  Assessment & Plan  Patient drinks a very large glass of wine nightly  · We will start thiamine, folic acid, multivitamin  · We will monitor on CIWA scale  · Patient is also prescribed concurrent benzodiazepine with Xanax  Wife is concerned about patient drinking wine and taking xanax every night before bed   States he \"too stubborn\" to make lifestyle changes     Essential hypertension  Assessment & Plan  Patient has essential hypertension  · Continue home medications with Norvasc 10 mg: Which he takes at at bedtime, and lisinopril-HCTZ 20-25 mg he takes in the morning    Mild dementia [facility])  Assessment & Plan  Patient was seen by neurology in the past and diagnosed with dementia  · He was started on Aricept 10 mg daily which his wife discontinued approximately 1 month ago due to concerns of a personality change  · Patient's home nocturnal regimen of Xanax 0 5 mg, a large glass of wine, Tylenol arthritis plus Tylenol PM is not optimal in the geriatric population  · He also notes worsening gait and ambulatory dysfunction  · TSH, folate normal  · He will need outpatient follow-up with neurology as they are continuing to fill the Aricept and send it to his house via [facility]    Hypothyroidism  Assessment & Plan  Continue Synthroid    Benign prostatic hyperplasia  Assessment & Plan  Patient has history of BPH and follows with urology  · Continue Flomax  · Denies any current urinary symptoms    Anxiety  Assessment & Plan  Patient has a history of anxiety  · Is prescribed Prozac 80 mg daily as " "well as Xanax 0 5 mg at bedtime  · Denies taking Prozac daily, states that he \"takes it sometimes once a week or once a month\"  · PDMP website was queried, no red flags      Medical Problems     Resolved Problems  Date Reviewed: 6/13/2023   None       Discharging Physician / Practitioner: Kd Salter PA-C  PCP: Swati Giron MD  Admission Date:   Admission Orders (From admission, onward)     Ordered        06/10/23 1121  INPATIENT ADMISSION  Once                      Discharge Date: 06/13/23    Consultations During Hospital Stay:  · geriatrics    Procedures Performed:   · none    Significant Findings / Test Results:   XR chest portable  Result Date: 6/11/202    Impression: No acute cardiopulmonary disease  Workstation performed: [id number]     Echo complete w/ contrast if indicated  Result Date: 6/11/2023    Narrative: Technically good quality transthoracic echocardiogram study  Mild concentric left ventricular hypertrophy, normal left ventricle systolic function, grade 1 diastolic dysfunction  Left ventricular ejection fraction is estimated around 64%  Normal right ventricle size and systolic function  Normal left and right atrial cavity size  Moderately thickened aortic valve with sclerosis and focal calcification and reduced cuspal separation  Moderate aortic valve stenosis and mild to moderate aortic valve regurgitation AV Vmax 3 29 m/sec, mean gradient 19 8 mm Hg, AV Area 1 3 cm², DV Index is 0 42, LV SV Index 50 17 mL/m²)  Mitral valvular sclerosis, trace mitral valve regurgitation  Mild tricuspid and trace pulmonic valve regurgitation  No obvious pulmonary hypertension  No pericardial effusion  No previous echocardiogram is available for comparison     CT head without contrast  Result Date: 6/10/2023    Impression: No acute intracranial abnormality  Mild progressive ventriculomegaly without evidence of obstructive hydrocephalus  Findings may be secondary to progressive central volume loss   Findings " are equivocal for normal pressure hydrocephalus  Workstation performed: [id number]       Incidental Findings:   · none     Test Results Pending at Discharge (will require follow up):   · none     Outpatient Tests Requested:  · none    Complications:  none    Reason for Admission: fall    Hospital Course:   Caitlyn Victoria is a [de-identified] y o  male patient with past medical history anxiety, dementia, hypertension, hypothyroidism who originally presented to the hospital on 6/10/2023 due to a fall  According to the patient's wife on admission, this is his second fall in the last few months, he reported that he awakened during the night to use the restroom and while ambulating his legs gave out under him  He reported generalized weakness in both legs, denied any episodes of syncope  While in the ED, CT scan was negative, he was ultimately admitted for ambulatory dysfunction  After further review of the patient's chart as well as from the patient and his wife, it appears that he takes Xanax, Tylenol PM, and a large glass of wine prior to going to bed every evening  Despite multiple attempts to tell him otherwise, his wife has been unsuccessful in convincing him not to take all of these medications simultaneously  He also reports that he will continuously take Tylenol PM until he falls asleep  Manages his own medications at home, geriatrics was ultimately consulted who further investigated his medications  It appeared that he was not taking medications as prescribed, they recommended additional oversight in regard to his medications at home  After discussion with the patient and the patient's wife at bedside, he agreed to allow her to help manage his medications  Additionally, will be provided with home nurses as well as home physical and Occupational Therapy  Please see above list of diagnoses and related plan for additional information       Condition at Discharge: stable    Discharge Day Visit / Exam:   Subjective: "The patient is seen resting comfortably in bed, he is asking when he is able to go home  He is very eager to leave  He denies any headedness, dizziness, chest pain, shortness of breath, nausea/vomiting  Vitals: Blood Pressure: 156/77 (06/13/23 0730)  Pulse: 66 (06/13/23 0730)  Temperature: (!) 97 3 °F (36 3 °C) (06/13/23 0730)  Temp Source: Temporal (06/13/23 0730)  Respirations: 18 (06/13/23 0730)  Height: 5' 8\" (172 7 cm) (06/11/23 1140)  Weight - Scale: 72 3 kg (159 lb 6 3 oz) (06/13/23 0544)  SpO2: 94 % (06/13/23 0730)  Exam:   Physical Exam  Vitals and nursing note reviewed  Constitutional:       General: He is not in acute distress  Appearance: Normal appearance  He is not ill-appearing, toxic-appearing or diaphoretic  HENT:      Head: Normocephalic and atraumatic  Cardiovascular:      Rate and Rhythm: Normal rate and regular rhythm  Heart sounds: Murmur heard  No friction rub  No gallop  Pulmonary:      Effort: Pulmonary effort is normal  No respiratory distress  Breath sounds: Normal breath sounds  No wheezing, rhonchi or rales  Abdominal:      General: Abdomen is flat  Bowel sounds are normal  There is no distension  Palpations: Abdomen is soft  Tenderness: There is no abdominal tenderness  Musculoskeletal:      Right lower leg: No edema  Left lower leg: No edema  Skin:     General: Skin is warm and dry  Coloration: Skin is not jaundiced or pale  Neurological:      General: No focal deficit present  Mental Status: He is alert and oriented to person, place, and time  Mental status is at baseline  *  Discussion with Family: Updated  (wife) at bedside  Discharge instructions/Information to patient and family:   See after visit summary for information provided to patient and family  Provisions for Follow-Up Care:  See after visit summary for information related to follow-up care and any pertinent home health orders       " Disposition:   Home with VNA Services (Reminder: Complete face to face encounter)    Planned Readmission: n/a     Discharge Statement:  I spent 55 minutes discharging the patient  This time was spent on the day of discharge  I had direct contact with the patient on the day of discharge  Greater than 50% of the total time was spent examining patient, answering all patient questions, arranging and discussing plan of care with patient as well as directly providing post-discharge instructions  Additional time then spent on discharge activities  Discharge Medications:  See after visit summary for reconciled discharge medications provided to patient and/or family        **Please Note: This note may have been constructed using a voice recognition system**

## 2023-06-14 NOTE — UTILIZATION REVIEW
NOTIFICATION OF ADMISSION DISCHARGE   This is a Notification of Discharge from [address]  Please be advised that this patient has been discharge from our facility  Below you will find the admission and discharge date and time including the patient’s disposition  UTILIZATION REVIEW CONTACT:  Val Rizvi  Utilization   Network Utilization Review Department  Phone: [phone] x carefully listen to the prompts  All voicemails are confidential   Email: [email or web address]     ADMISSION INFORMATION  PRESENTATION DATE: 6/10/2023  8:59 AM  OBERVATION ADMISSION DATE:   INPATIENT ADMISSION DATE: 6/10/23 11:21 AM   DISCHARGE DATE: 6/13/2023  2:54 PM   DISPOSITION:Home with Home Health Care    IMPORTANT INFORMATION:  Send all requests for admission clinical reviews, approved or denied determinations and any other requests to dedicated fax number below belonging to the campus where the patient is receiving treatment   List of dedicated fax numbers:  [address] DENIALS (Administrative/Medical Necessity) [phone]   [address] (Maternity/NICU/Pediatrics) [phone]   [facility] [phone]   [facility] [phone]   [address] [phone]   220 [facility] [phone] 90 [facility] [phone]   [address] [place], [place] [address] [address] [phone]   [facility]  [phone] 405[address] [facility] [phone]   412 [facility] [address] [facility] [phone]

## 2023-06-22 ENCOUNTER — TELEPHONE (OUTPATIENT)
Age: 78
End: 2023-06-22

## 2023-06-22 NOTE — TELEPHONE ENCOUNTER
Received a phone call from [phone] [place] [address] provider stating that they are discharging the patient due to him not needing home health any longer. They are recommending outpatient therapy services due to balance dysfunction, requesting that pcp write a prescription for PT during next office visit. I do see that you are listed as patient's pcp, but I do not see that there are any encounters in the chart for you. Please review and advise, thank you.

## 2023-07-11 LAB — HBA1C MFR BLD HPLC: 5.8 %

## 2023-07-13 ENCOUNTER — OFFICE VISIT (OUTPATIENT)
Dept: FAMILY MEDICINE CLINIC | Facility: CLINIC | Age: 78
End: 2023-07-13
Payer: COMMERCIAL

## 2023-07-13 VITALS
DIASTOLIC BLOOD PRESSURE: 62 MMHG | HEART RATE: 65 BPM | RESPIRATION RATE: 16 BRPM | OXYGEN SATURATION: 96 % | WEIGHT: 162 LBS | BODY MASS INDEX: 24.55 KG/M2 | TEMPERATURE: 97.6 F | SYSTOLIC BLOOD PRESSURE: 138 MMHG | HEIGHT: 68 IN

## 2023-07-13 DIAGNOSIS — N40.0 BENIGN PROSTATIC HYPERPLASIA, UNSPECIFIED WHETHER LOWER URINARY TRACT SYMPTOMS PRESENT: ICD-10-CM

## 2023-07-13 DIAGNOSIS — E03.9 HYPOTHYROIDISM, UNSPECIFIED TYPE: ICD-10-CM

## 2023-07-13 DIAGNOSIS — R82.90 ABNORMAL URINE: ICD-10-CM

## 2023-07-13 DIAGNOSIS — F03.A4 MILD DEMENTIA WITH ANXIETY, UNSPECIFIED DEMENTIA TYPE (HCC): ICD-10-CM

## 2023-07-13 DIAGNOSIS — G47.00 INSOMNIA, UNSPECIFIED TYPE: Primary | ICD-10-CM

## 2023-07-13 DIAGNOSIS — E78.5 HYPERLIPIDEMIA, UNSPECIFIED HYPERLIPIDEMIA TYPE: ICD-10-CM

## 2023-07-13 DIAGNOSIS — N17.9 AKI (ACUTE KIDNEY INJURY) (HCC): ICD-10-CM

## 2023-07-13 DIAGNOSIS — R73.01 IMPAIRED FASTING GLUCOSE: ICD-10-CM

## 2023-07-13 DIAGNOSIS — I10 ESSENTIAL HYPERTENSION: ICD-10-CM

## 2023-07-13 DIAGNOSIS — F41.9 ANXIETY: ICD-10-CM

## 2023-07-13 DIAGNOSIS — I35.0 AORTIC STENOSIS, MODERATE: ICD-10-CM

## 2023-07-13 LAB
SL AMB  POCT GLUCOSE, UA: NORMAL
SL AMB LEUKOCYTE ESTERASE,UA: NORMAL
SL AMB POCT BILIRUBIN,UA: NORMAL
SL AMB POCT BLOOD,UA: NORMAL
SL AMB POCT CLARITY,UA: CLEAR
SL AMB POCT COLOR,UA: YELLOW
SL AMB POCT KETONES,UA: NORMAL
SL AMB POCT NITRITE,UA: NORMAL
SL AMB POCT PH,UA: 6
SL AMB POCT SPECIFIC GRAVITY,UA: 1.02
SL AMB POCT URINE PROTEIN: NORMAL
SL AMB POCT UROBILINOGEN: NORMAL

## 2023-07-13 PROCEDURE — 99214 OFFICE O/P EST MOD 30 MIN: CPT | Performed by: FAMILY MEDICINE

## 2023-07-13 PROCEDURE — 81002 URINALYSIS NONAUTO W/O SCOPE: CPT | Performed by: FAMILY MEDICINE

## 2023-07-13 RX ORDER — SERTRALINE HYDROCHLORIDE 25 MG/1
25 TABLET, FILM COATED ORAL DAILY
Qty: 90 TABLET | Refills: 0 | Status: SHIPPED | OUTPATIENT
Start: 2023-07-13 | End: 2024-01-09

## 2023-07-13 NOTE — ASSESSMENT & PLAN NOTE
7/2023 GFR 52 worse. Keep hydrated. Avoid motrin/ibuprofen/aleve NSAIDs nephrotoxic agents. Recheck BMP in 2 weeks.

## 2023-07-13 NOTE — PROGRESS NOTES
Name: Christoph Quezada      : 1945      MRN: [id number]  Encounter Provider: Chantell Nuno MD  Encounter Date: 2023   Encounter department: [address] [place] [address],[address]     1. Insomnia, unspecified type  Assessment & Plan:  Continue melatonin 5mg qhs.      2. Anxiety  Assessment & Plan:  Give zoloft 25mg QD. Continue xanax 0.5mg qhs prn. SE educated pt including respiratory distress. Do not use xanax with alcohol. Orders:  -     sertraline (ZOLOFT) 25 mg tablet; Take 1 tablet (25 mg total) by mouth daily    3. Essential hypertension  Assessment & Plan:  Controlled. DASH diet. Continue prinzide and amlodipine 10mg QD. 4. Aortic stenosis, moderate  Assessment & Plan:  Refer to cardiology. Orders:  -     Ambulatory Referral to Cardiology; Future    5. Hypothyroidism, unspecified type  Assessment & Plan:  Continue levothyroxine 75mcg daily. 2023 TSH normal.       6. Impaired fasting glucose  Assessment & Plan:  2023 hgA1C 5.8 stable. Low carb diet. 7. Hyperlipidemia, unspecified hyperlipidemia type  Assessment & Plan:  Low fat diet. Continue niacin. 8. Benign prostatic hyperplasia, unspecified whether lower urinary tract symptoms present  Assessment & Plan:  Continue Lolly. 9. Mild dementia with anxiety, unspecified dementia type [facility])  Assessment & Plan:   neurology. 10. PAOLA (acute kidney injury) ([address] )  Assessment & Plan:  2023 GFR 52 worse. Keep hydrated. Avoid motrin/ibuprofen/aleve NSAIDs nephrotoxic agents. Recheck BMP in 2 weeks. Orders:  -     Basic metabolic panel; Future; Expected date: 2023    11. Abnormal urine  -     POCT urine dip      Falls Plan of Care: balance, strength, and gait training instructions were provided. Reviewed lab in 2023  CMP showed Cr 1.37, GFR 52  HgA1C 5.8 stable  Lipid 203/187/44/122 ok  TSH normal  RTO in 3 months.        Subjective      HPI     Pt is here with his wife.    Was in hospital 6/10-6/13/2023 for Fall/ambulatory dysfunction. Recommend neurology OP. Wife states pt did not drink alcohol or use tylenol PM/sleep aid since hospitalization.      Insomnia---He uses xanax 0.5mg and melatonin 5mg qhs which helped.      Anxiety/OCD---Pt did not use prozac 80mg QD for a while. Would like to try zoloft.      HTN---He is on lisinopril-HCTZ 20-25mg QD and amlodipine 10mg QD. Denies SE. Denies headache, SOB, CP.      Hypothyroidism---He is on levothyroxine 75mcg daily. Feels fine. 6/2023 TSH normal.      IFG---follows low carb diet.      Hyperlipidemia---follows low fat diet. Not on statins. He is on OTC niacin daily.      Tension headache---use fioricet as needed, not use it for a while. FU neurology yearly.   Mild dementia---FU neurology. He is off Aricept per wife.       BPH---FU urology Dr. King for BPH. He is on Roz which helped. Pt states his urine showed blood before, would like to recheck. No symptoms. FU opthalmology yearly.      No smoking.   No more wine in last 2 months. Review of Systems   Constitutional: Negative for appetite change, chills and fever. HENT: Negative for congestion, ear pain, sinus pain and sore throat. Eyes: Negative for discharge and itching. Respiratory: Negative for apnea, cough, chest tightness, shortness of breath and wheezing. Cardiovascular: Negative for chest pain, palpitations and leg swelling. Gastrointestinal: Negative for abdominal pain, anal bleeding, constipation, diarrhea, nausea and vomiting. Endocrine: Negative for cold intolerance, heat intolerance and polyuria. Genitourinary: Negative for difficulty urinating and dysuria. Musculoskeletal: Negative for arthralgias, back pain and myalgias. Skin: Negative for rash. Neurological: Negative for dizziness and headaches. Psychiatric/Behavioral: Negative for agitation.        Current Outpatient Medications on File Prior to Visit   Medication Sig   • Acetaminophen (TYLENOL PO) Take by mouth as needed   • ALPRAZolam (XANAX) 0.5 mg tablet Take 1 tablet (0.5 mg total) by mouth daily at bedtime as needed for anxiety   • amLODIPine (NORVASC) 10 mg tablet Take 1 tablet (10 mg total) by mouth daily   • butalbital-acetaminophen-caffeine (FIORICET,ESGIC) -40 mg per tablet Take 1 tablet by mouth daily as needed for headaches   • Dutasteride-Tamsulosin HCl 0.5-0.4 MG CAPS Take 1 capsule by mouth daily   • levothyroxine 75 mcg tablet Take 1 tablet (75 mcg total) by mouth daily   • lisinopril-hydrochlorothiazide (PRINZIDE,ZESTORETIC) 20-25 MG per tablet TAKE 1 TABLET DAILY   • niacin 500 mg tablet Take 500 mg by mouth daily with breakfast   • [DISCONTINUED] FLUoxetine (PROzac) 40 MG capsule Take 2 capsules (80 mg total) by mouth daily (Patient not taking: Reported on 7/13/2023)       Objective     /62   Pulse 65   Temp 97.6 °F (36.4 °C) (Temporal)   Resp 16   Ht 5' 8" (1.727 m)   Wt 73.5 kg (162 lb)   SpO2 96%   BMI 24.63 kg/m²     Physical Exam  Constitutional:       Appearance: He is well-developed. HENT:      Head: Normocephalic and atraumatic. Eyes:      General:         Right eye: No discharge. Left eye: No discharge. Conjunctiva/sclera: Conjunctivae normal.   Cardiovascular:      Rate and Rhythm: Normal rate and regular rhythm. Heart sounds: Normal heart sounds. No murmur heard. No friction rub. No gallop. Pulmonary:      Effort: Pulmonary effort is normal. No respiratory distress. Breath sounds: Normal breath sounds. No wheezing or rales. Abdominal:      General: Bowel sounds are normal. There is no distension. Palpations: Abdomen is soft. Tenderness: There is no abdominal tenderness. There is no guarding. Musculoskeletal:         General: Normal range of motion. Cervical back: Normal range of motion and neck supple. No tenderness. Right lower leg: No edema. Left lower leg: No edema. Lymphadenopathy:      Cervical: No cervical adenopathy. Neurological:      Mental Status: He is alert.        Chris Schwartz MD

## 2023-07-13 NOTE — ASSESSMENT & PLAN NOTE
Give zoloft 25mg QD. Continue xanax 0.5mg qhs prn. SE educated pt including respiratory distress. Do not use xanax with alcohol.

## 2023-08-25 DIAGNOSIS — I10 ESSENTIAL HYPERTENSION: ICD-10-CM

## 2023-08-25 RX ORDER — AMLODIPINE BESYLATE 10 MG/1
10 TABLET ORAL DAILY
Qty: 90 TABLET | Refills: 1 | Status: SHIPPED | OUTPATIENT
Start: 2023-08-25

## 2023-09-07 ENCOUNTER — CONSULT (OUTPATIENT)
Dept: CARDIOLOGY CLINIC | Facility: CLINIC | Age: 78
End: 2023-09-07
Payer: COMMERCIAL

## 2023-09-07 VITALS
HEIGHT: 68 IN | SYSTOLIC BLOOD PRESSURE: 126 MMHG | HEART RATE: 88 BPM | WEIGHT: 160 LBS | DIASTOLIC BLOOD PRESSURE: 60 MMHG | OXYGEN SATURATION: 98 % | BODY MASS INDEX: 24.25 KG/M2

## 2023-09-07 DIAGNOSIS — I73.9 CLAUDICATION OF BOTH LOWER EXTREMITIES (HCC): ICD-10-CM

## 2023-09-07 DIAGNOSIS — I35.0 AORTIC STENOSIS, MODERATE: Primary | ICD-10-CM

## 2023-09-07 DIAGNOSIS — I10 PRIMARY HYPERTENSION: ICD-10-CM

## 2023-09-07 PROCEDURE — 99203 OFFICE O/P NEW LOW 30 MIN: CPT | Performed by: INTERNAL MEDICINE

## 2023-09-07 NOTE — PROGRESS NOTES
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 Aortic Stenosis  - Echo from 6/11/2023 demonstrated LVH, grade 1 diastolic dysfunction, EF 27%, moderate aortic stenosis with AV area 1.3 cm2, DVI 0.42 m/sec, mitral sclerosis, trace MR, mild tricuspid and trace pulmonic regurg   - No shortness of breath on exertion, no syncope, presyncope  - discussed symptoms to watch out for  - Yearly echo  - Discussed the patient will likely need a TAVR in the future when he becomes symptomatic    2. ISAÍAS Claudication   - Thigh claudication for the last several years   - thigh pain with exertion, better with rest. Better with leaning on a cart while grocery shopping.   - No previous work up for claudicaion   - Patient started more classic for neurogenic claudication however will rule out vascular claudication with SAUMYA  - If SAUMYA is normal then will refer back to PCP for further work-up for neurogenic claudication    3. HTN  - Well-controlled  /60 today  - Continue amlodipine 10 mg daily, HCTZ/lisinopril 25/20 mg daily      Schedule a follow-up appointment as needed. Chief Complaint: Heart murmur  Subjective     History of Present Illness:  Mr. Donn Joaquin is an 78year old male with CKD, mild dementia, hypothyroidism, BPH, anxiety, who presents to clinic from PCP office due to heart murmur. Patient has echocardiogram 6/11/2023 which demonstrated moderate aortic stenosis. Patient denies shortness of breath on exertion, lightheadedness, dizziness and presyncope/syncope. He does note that when he is at the grocery store he is able to walk a full store without being short of breath however he does have pain in his thighs. He states the pain in his thigh. States that the pain in his thigh necessitates that he stops and takes a break after about 20 minutes.   He stopped mowing the lawn a couple of years ago due to this lower extremity pain  He states that the pain is better with rest, worse with exertion. The pain is better when he is up using the shopping cart and leaning on it. He otherwise denies chest pain, palpitations, shortness of breath, nausea, vomiting, diarrhea. Previous Cardiology Workup:  TREADMILL STRESS  No results found for this or any previous visit.     ----------------------------------------------------------------------------------------------  NUCLEAR STRESS TEST: No results found for this or any previous visit. No results found for this or any previous visit.      --------------------------------------------------------------------------------  CATH:  No results found for this or any previous visit.    --------------------------------------------------------------------------------  ECHO:   2D complete echo 6/11/2023:  Mild concentric LVH, grade 1 diastolic dysfunction, LVEF 64%, moderate aortic valve stenosis with mild to moderate regurg, aortic valve area 1.3 cm, DVI 0.42, aortic valve max gradient 2.2 m/s and mean 19.8. Mitral sclerosis, trace MR, mild tricuspid and trace pulmonic regurg.    --------------------------------------------------------------------------------  HOLTER  No results found for this or any previous visit.    --------------------------------------------------------------------------------  CAROTIDS  No results found for this or any previous visit.       ---------------------------------------------------------------------------------  Review of Systems   Constitutional: Negative for chills and fever. HENT: Negative for ear pain and sore throat. Eyes: Negative for pain and visual disturbance. Respiratory: Negative for cough and shortness of breath. Cardiovascular: Negative for chest pain and palpitations. Gastrointestinal: Negative for abdominal pain and vomiting. Genitourinary: Negative for dysuria and hematuria. Musculoskeletal: Negative for arthralgias and back pain.         Bilateral lower extremity claudication   Skin: Negative for color change and rash. Neurological: Negative for seizures and syncope. All other systems reviewed and are negative.         Current Outpatient Medications:   •  Acetaminophen (TYLENOL PO), Take by mouth as needed, Disp: , Rfl:   •  amLODIPine (NORVASC) 10 mg tablet, Take 1 tablet (10 mg total) by mouth daily, Disp: 90 tablet, Rfl: 1  •  butalbital-acetaminophen-caffeine (FIORICET,ESGIC) -40 mg per tablet, Take 1 tablet by mouth daily as needed for headaches, Disp: 90 tablet, Rfl: 0  •  Dutasteride-Tamsulosin HCl 0.5-0.4 MG CAPS, Take 1 capsule by mouth daily, Disp: , Rfl:   •  levothyroxine 75 mcg tablet, Take 1 tablet (75 mcg total) by mouth daily, Disp: 90 tablet, Rfl: 3  •  lisinopril-hydrochlorothiazide (PRINZIDE,ZESTORETIC) 20-25 MG per tablet, TAKE 1 TABLET DAILY, Disp: 90 tablet, Rfl: 3  •  niacin 500 mg tablet, Take 500 mg by mouth daily with breakfast, Disp: , Rfl:   •  sertraline (ZOLOFT) 25 mg tablet, Take 1 tablet (25 mg total) by mouth daily, Disp: 90 tablet, Rfl: 0  •  ALPRAZolam (XANAX) 0.5 mg tablet, Take 1 tablet (0.5 mg total) by mouth daily at bedtime as needed for anxiety (Patient not taking: Reported on 9/7/2023), Disp: 90 tablet, Rfl: 0  Past Medical History:   Diagnosis Date   • Abnormal weight loss    • Anxiety    • BPH (benign prostatic hyperplasia)    • Bursitis of left hip    • Cervical radiculopathy    • Colon polyps    • Dementia (HCC)    • Depression    • Disease of thyroid gland    • Heart murmur    • Hypertension    • Nicotine dependence in remission    • Non-toxic multinodular goiter    • Psychiatric disorder      Past Surgical History:   Procedure Laterality Date   • Lanre Cabral Left     Dr. Esme Cuadra; onset: 8/6/13   • COLONOSCOPY      3/20/13, normal clon - Dr. Marvin Argueta, repeat in 5-10 years   • HIP SURGERY     • DC COLONOSCOPY FLX DX W/COLLJ [facility] WHEN P[facility]MD N/A 3/28/2018    Procedure: COLONOSCOPY;  Surgeon: Marlys Rodriguez, MD;  Location: [facility]; Service: Colorectal   • THYROIDECTOMY, PARTIAL     • TONSILLECTOMY     • TOTAL HIP ARTHROPLASTY       Social History     Socioeconomic History   • Marital status: /Civil Union     Spouse name: Not on file   • Number of children: Not on file   • Years of education: completed some college   • Highest education level: Not on file   Occupational History   • Not on file   Tobacco Use   • Smoking status: Former   • Smokeless tobacco: Never   Substance and Sexual Activity   • Alcohol use: Yes     Alcohol/week: 2.0 standard drinks of alcohol     Types: 2 Glasses of wine per week     Comment: 12 oz every night   • Drug use: No   • Sexual activity: Not on file   Other Topics Concern   • Not on file   Social History Narrative    Caffeine use    Marital relationship problem     Social Determinants of Health     Financial Resource Strain: Not on file   Food Insecurity: No Food Insecurity (6/11/2023)    Hunger Vital Sign    • Worried About Running Out of Food in the Last Year: Never true    • Ran Out of Food in the Last Year: Never true   Transportation Needs: No Transportation Needs (6/11/2023)    PRAPARE - Transportation    • Lack of Transportation (Medical): No    • Lack of Transportation (Non-Medical): No   Physical Activity: Not on file   Stress: Not on file   Social Connections: Not on file   Intimate Partner Violence: Not on file   Housing Stability: Low Risk  (6/11/2023)    Housing Stability Vital Sign    • Unable to Pay for Housing in the Last Year: No    • Number of Places Lived in the Last Year: 1    • Unstable Housing in the Last Year: No     Family History   Problem Relation Age of Onset   • Diabetes Father    • Lung cancer Father    • Alcohol abuse Paternal Uncle    • Heart disease Paternal Uncle      Allergies   Allergen Reactions   • Aspirin      Other reaction(s): Blood Disorders, rectal bleeding/friable prostate  Category:  Adverse Reaction;        Objective     Vitals: 09/07/23 1455   BP: 126/60   BP Location: Right arm   Patient Position: Sitting   Cuff Size: Standard   Pulse: 88   SpO2: 98%   Weight: 72.6 kg (160 lb)   Height: 5' 8" (1.727 m)       Physical exam:     Physical Exam  Vitals and nursing note reviewed. Constitutional:       General: He is not in acute distress. Appearance: He is well-developed. HENT:      Head: Normocephalic and atraumatic. Eyes:      Conjunctiva/sclera: Conjunctivae normal.   Cardiovascular:      Rate and Rhythm: Normal rate and regular rhythm. Pulses: Normal pulses. Heart sounds: No friction rub. No gallop. Comments: 3/6 systolic ejection murmur throughout precordium  2+ bilateral dorsalis pedis and tibialis posterior pulses  Pulmonary:      Effort: Pulmonary effort is normal. No respiratory distress. Breath sounds: Normal breath sounds. Abdominal:      Palpations: Abdomen is soft. Tenderness: There is no abdominal tenderness. Musculoskeletal:         General: No swelling. Cervical back: Neck supple. Skin:     General: Skin is warm and dry. Capillary Refill: Capillary refill takes less than 2 seconds. Neurological:      Mental Status: He is alert. Psychiatric:         Mood and Affect: Mood normal.           ==  PLEASE NOTE:  This encounter was completed utilizing the [employer] Direct Speech Voice Recognition Software. Grammatical errors, random word insertions, pronoun errors and incomplete sentences are occasional consequences of the system due to software limitations, ambient noise and hardware issues. These may be missed by proof reading prior to affixing electronic signature. Any questions or concerns about the content, text or information contained within the body of this dictation should be directly addressed to the physician for clarification. Please do not hesitate to call me directly if you have any any questions or concerns.

## 2023-10-02 ENCOUNTER — HOSPITAL ENCOUNTER (OUTPATIENT)
Dept: NON INVASIVE DIAGNOSTICS | Facility: CLINIC | Age: 78
Discharge: HOME | End: 2023-10-02
Payer: COMMERCIAL

## 2023-10-02 DIAGNOSIS — I73.9 CLAUDICATION OF BOTH LOWER EXTREMITIES (HCC): ICD-10-CM

## 2023-10-02 PROCEDURE — 93923 UPR/LXTR ART STDY 3+ LVLS: CPT

## 2023-10-02 PROCEDURE — 93923 UPR/LXTR ART STDY 3+ LVLS: CPT | Performed by: SURGERY

## 2023-10-04 NOTE — RESULT ENCOUNTER NOTE
I discussed results with Dr. Fay Huitron prior to calling patient. SAUMYA and right lower extremity was 0.61 with monophasic waveforms at the metatarsal and great toe which is in the severe range. Left lower extremity SAUMYA was 1.17 which is within normal range. Given new diagnosis of peripheral arterial disease I reviewed patient's medication. He is on ACE inhibitor however he is not on antiplatelets or statin. I have placed orders for atorvastatin 80 mg daily, Plavix 75 mg daily and cilostazol 100 mg twice daily before meals. I have also encourage patient to increase exercise. I also placed order for referral to vascular given severe PAD. I discussed the above with patient's wife, Ramona Jane. All questions were answered.

## 2023-10-05 DIAGNOSIS — R51.9 CHRONIC INTRACTABLE HEADACHE, UNSPECIFIED HEADACHE TYPE: ICD-10-CM

## 2023-10-05 DIAGNOSIS — G89.29 CHRONIC INTRACTABLE HEADACHE, UNSPECIFIED HEADACHE TYPE: ICD-10-CM

## 2023-10-05 DIAGNOSIS — E03.9 HYPOTHYROIDISM, UNSPECIFIED TYPE: ICD-10-CM

## 2023-10-05 NOTE — TELEPHONE ENCOUNTER
LMOM: Hello, I'm calling in for refills for my , Ke Galeas He needs a refill for his Fioricet tabs. The generic for Fioricet tabs. It's butalbital- acetaminophen and caffeine tabs 50 Slash 325. Take one tablet daily as needed for headaches. The quantity with our 90 tablets,That's the amount we're allowed. OK? And the next one is levothyroxine Synthroid Tabs 75 MCG Generic for Synthroid Tablets 1 tablet daily and that quantity is 90 tablets also the last three months. Thank you. Appreciate everything. OK, Thank you.  Bye, bye.

## 2023-10-06 ENCOUNTER — RA CDI HCC (OUTPATIENT)
Dept: OTHER | Facility: HOSPITAL | Age: 78
End: 2023-10-06

## 2023-10-06 RX ORDER — LEVOTHYROXINE SODIUM 0.07 MG/1
75 TABLET ORAL DAILY
Qty: 90 TABLET | Refills: 3 | Status: SHIPPED | OUTPATIENT
Start: 2023-10-06 | End: 2024-10-19

## 2023-10-06 RX ORDER — BUTALBITAL, ACETAMINOPHEN AND CAFFEINE 50; 325; 40 MG/1; MG/1; MG/1
1 TABLET ORAL DAILY PRN
Qty: 90 TABLET | Refills: 0 | Status: SHIPPED | OUTPATIENT
Start: 2023-10-06

## 2023-10-06 NOTE — PROGRESS NOTES
[address] [facility] coding opportunities       Chart reviewed, no opportunity found: [address]        Patients Insurance     Medicare Insurance: [employer]

## 2023-10-13 ENCOUNTER — OFFICE VISIT (OUTPATIENT)
Dept: FAMILY MEDICINE CLINIC | Facility: CLINIC | Age: 78
End: 2023-10-13
Payer: COMMERCIAL

## 2023-10-13 VITALS
HEIGHT: 68 IN | DIASTOLIC BLOOD PRESSURE: 62 MMHG | RESPIRATION RATE: 16 BRPM | WEIGHT: 163 LBS | BODY MASS INDEX: 24.71 KG/M2 | TEMPERATURE: 97.8 F | OXYGEN SATURATION: 96 % | SYSTOLIC BLOOD PRESSURE: 130 MMHG | HEART RATE: 80 BPM

## 2023-10-13 DIAGNOSIS — I10 ESSENTIAL HYPERTENSION: ICD-10-CM

## 2023-10-13 DIAGNOSIS — Z00.00 MEDICARE ANNUAL WELLNESS VISIT, SUBSEQUENT: ICD-10-CM

## 2023-10-13 DIAGNOSIS — E03.9 HYPOTHYROIDISM, UNSPECIFIED TYPE: ICD-10-CM

## 2023-10-13 DIAGNOSIS — G47.00 INSOMNIA, UNSPECIFIED TYPE: ICD-10-CM

## 2023-10-13 DIAGNOSIS — Z78.9 ALCOHOL USE: ICD-10-CM

## 2023-10-13 DIAGNOSIS — R73.01 IMPAIRED FASTING GLUCOSE: ICD-10-CM

## 2023-10-13 DIAGNOSIS — I73.9 PAD (PERIPHERAL ARTERY DISEASE) (HCC): Primary | ICD-10-CM

## 2023-10-13 DIAGNOSIS — F41.9 ANXIETY: ICD-10-CM

## 2023-10-13 DIAGNOSIS — E78.5 HYPERLIPIDEMIA, UNSPECIFIED HYPERLIPIDEMIA TYPE: ICD-10-CM

## 2023-10-13 PROCEDURE — G0439 PPPS, SUBSEQ VISIT: HCPCS | Performed by: FAMILY MEDICINE

## 2023-10-13 PROCEDURE — 99214 OFFICE O/P EST MOD 30 MIN: CPT | Performed by: FAMILY MEDICINE

## 2023-10-13 NOTE — PROGRESS NOTES
Chief Complaint   Patient presents with    Medicare Wellness Visit     Subsequent visit      Health Maintenance   Topic Date Due    COVID-19 Vaccine (4 - Pfizer series) 11/27/2021    Colorectal Cancer Screening  03/28/2023    Medicare Annual Wellness Visit (AWV)  07/12/2023    Influenza Vaccine (1) 09/01/2023    Falls: Plan of Care  07/13/2024    Fall Risk  10/13/2024    Depression Screening  10/13/2024    BMI: Adult  10/13/2024    Pneumococcal Vaccine: 65+ Years  Completed    HIB Vaccine  Aged Out    IPV Vaccine  Aged Out    Hepatitis A Vaccine  Aged Out    Meningococcal ACWY Vaccine  Aged Out    HPV Vaccine  Aged Out        Assessment and Plan:     Problem List Items Addressed This Visit          Endocrine    Hypothyroidism     Continue levothyroxine 75mcg daily. Relevant Orders    CBC and differential    Comprehensive metabolic panel    Hemoglobin A1C    Lipid Panel with Direct LDL reflex    TSH, 3rd generation with Free T4 reflex    Impaired fasting glucose     Advised pt to follow low carb diet. Relevant Orders    CBC and differential    Comprehensive metabolic panel    Hemoglobin A1C    Lipid Panel with Direct LDL reflex    TSH, 3rd generation with Free T4 reflex       Cardiovascular and Mediastinum    Essential hypertension     Controlled. DASH diet. Continue prinzide and amlodipine. Relevant Orders    CBC and differential    Comprehensive metabolic panel    Hemoglobin A1C    Lipid Panel with Direct LDL reflex    TSH, 3rd generation with Free T4 reflex    PAD (peripheral artery disease) ([address]) - Primary     Advised pt to make an appt with vascular surgeon as ordered. Other    Anxiety     Increase zoloft to 50mg QD. Relevant Medications    sertraline (ZOLOFT) 50 mg tablet    Hyperlipidemia     Continue atorvastatin 80mg qhs. SE educated pt.           Relevant Orders    CBC and differential    Comprehensive metabolic panel    Hemoglobin A1C    Lipid Panel with Direct LDL reflex    TSH, 3rd generation with Free T4 reflex    Alcohol use     Strongly advised pt to limit to 2 drinks/day. Pt refused SHARE program today. Insomnia     Pt states alcohol helped him sleep. Other Visit Diagnoses       Medicare annual wellness visit, subsequent                  Depression Screening and Follow-up Plan: Patient was screened for depression during today's encounter. They screened negative with a PHQ-2 score of 0. Flu shot yearly. Got it in [employer] already. Got Covid19 vaccines and new boosters. Pneumovax 2010 when he was 77. Got [id number] in 9/2015. Recommend shingrix. RTO in 6 months. Preventive health issues were discussed with patient, and age appropriate screening tests were ordered as noted in patient's After Visit Summary. Personalized health advice and appropriate referrals for health education or preventive services given if needed, as noted in patient's After Visit Summary. History of Present Illness:     Patient presents for a Medicare Wellness Visit    HPI     Pt is here with his wife. PAD---10/2023 SAUMYA 0.61 in RLE. He start cilostazol. Got referral to vascular surgeon. AS---FU cardiology. Insomnia---He uses melatonin 5-10mg qhs. Went to bed 11pm-1am. Woke up at 10am.   He drink 12 oz alcohol before bedtime. Anxiety/OCD---He stopped prozac 80mg QD. He is on zoloft 25mg daily. Denies SE.      HTN---He is on lisinopril-HCTZ 20-25mg QD and amlodipine 10mg QD. Denies SE. Denies headache, SOB, CP. Hypothyroidism---He is on levothyroxine 75mcg daily. Feels fine. 6/2023 TSH normal.      IFG---follows low carb diet. 7/2023 HgA1C 5.8 stable. Hyperlipidemia---He just got atorvastatin 80mg qhs, will start this weekend. Tension headache---use fioricet as needed, not use it everyday. FU neurology yearly. Mild dementia---FU neurology. He is off Aricept per wife. BPH---FU urology Dr. Gilbert Ball for BPH. He is on Roz which helped. FU opthalmology yearly. No smoking. He drank 12 oz wine every night. Lives with wife. Does all ADL's. Denies recent falls. Denies depression. Patient Care Team:  Ismael Avery MD as PCP - General (Family Medicine)  Conner eJnsen MD as Endoscopist  Amie Hernandez MD (Urology)     Review of Systems:     Review of Systems   Constitutional:  Negative for appetite change, chills and fever. HENT:  Negative for congestion, ear pain, sinus pain and sore throat. Eyes:  Negative for discharge and itching. Respiratory:  Negative for apnea, cough, chest tightness, shortness of breath and wheezing. Cardiovascular:  Negative for chest pain, palpitations and leg swelling. Gastrointestinal:  Negative for abdominal pain, anal bleeding, constipation, diarrhea, nausea and vomiting. Endocrine: Negative for cold intolerance, heat intolerance and polyuria. Genitourinary:  Negative for difficulty urinating and dysuria. Musculoskeletal:  Negative for arthralgias, back pain and myalgias. Skin:  Negative for rash. Neurological:  Negative for dizziness and headaches. Psychiatric/Behavioral:  Positive for sleep disturbance. Negative for agitation. The patient is nervous/anxious.          Problem List:     Patient Active Problem List   Diagnosis    Marital dysfunction    Anxiety    Benign prostatic hyperplasia    Cataract    Erectile dysfunction of non-organic origin    Hearing loss    Hyperlipidemia    Hypothyroidism    Impaired fasting glucose    Macrocytosis    Memory loss    Osteoarthritis    Primary localized osteoarthrosis of the hip    Tension type headache    PROVISIONAL Obsessive compulsive personality disorder    BMI 25.0-25.9,adult    Chronic intractable headache    Mild dementia ([address])    Essential hypertension    Alcohol use    Osteochondroma of femur    Primary localized osteoarthritis of pelvic region and thigh    Insomnia    Fall    Aortic stenosis, moderate    Troponin I above reference range    Ambulatory dysfunction    PAOLA (acute kidney injury) ([address])    PAD (peripheral artery disease) ([address])      Past Medical and Surgical History:     Past Medical History:   Diagnosis Date    Abnormal weight loss     Anxiety     BPH (benign prostatic hyperplasia)     Bursitis of left hip     Cervical radiculopathy     Colon polyps     Dementia (HCC)     Depression     Disease of thyroid gland     Heart murmur     Hypertension     Nicotine dependence in remission     Non-toxic multinodular goiter     Psychiatric disorder      Past Surgical History:   Procedure Laterality Date    Sharri [place] Left     Dr. Jeff Oliveira; onset: 8/6/13    COLONOSCOPY      3/20/13, normal clon - Dr. Ann Ashby, repeat in 5-10 years    HIP SURGERY      [place] COLONOSCOPY FLX DX W/COLLJ [facility] WHEN PFRMD N/A 3/28/2018    Procedure: COLONOSCOPY;  Surgeon: Jaleel Faye MD;  Location: [facility]; Service: Colorectal    THYROIDECTOMY, PARTIAL      TONSILLECTOMY      TOTAL HIP ARTHROPLASTY        Family History:     Family History   Problem Relation Age of Onset    Diabetes Father     Lung cancer Father     Alcohol abuse Paternal Uncle     Heart disease Paternal Uncle       Social History:     Social History     Socioeconomic History    Marital status: /Civil Union     Spouse name: None    Number of children: None    Years of education: completed some college    Highest education level: None   Occupational History    None   Tobacco Use    Smoking status: Former    Smokeless tobacco: Never   Substance and Sexual Activity    Alcohol use:  Yes     Alcohol/week: 2.0 standard drinks of alcohol     Types: 2 Glasses of wine per week     Comment: 12 oz every night    Drug use: No    Sexual activity: None   Other Topics Concern    None   Social History Narrative    Caffeine use    Marital relationship problem     Social Determinants of Health     Financial Resource Strain: Not on file   Food Insecurity: No Food Insecurity (6/11/2023)    Hunger Vital Sign     Worried About Running Out of Food in the Last Year: Never true     Ran Out of Food in the Last Year: Never true   Transportation Needs: No Transportation Needs (6/11/2023)    PRAPARE - Transportation     Lack of Transportation (Medical): No     Lack of Transportation (Non-Medical): No   Physical Activity: Not on file   Stress: Not on file   Social Connections: Not on file   Intimate Partner Violence: Not on file   Housing Stability: Low Risk  (6/11/2023)    Housing Stability Vital Sign     Unable to Pay for Housing in the Last Year: No     Number of Places Lived in the Last Year: 1     Unstable Housing in the Last Year: No      Medications and Allergies:     Current Outpatient Medications   Medication Sig Dispense Refill    Acetaminophen (TYLENOL PO) Take by mouth as needed      amLODIPine (NORVASC) 10 mg tablet Take 1 tablet (10 mg total) by mouth daily 90 tablet 1    atorvastatin (LIPITOR) 80 mg tablet Take 1 tablet (80 mg total) by mouth daily 30 tablet 3    butalbital-acetaminophen-caffeine (FIORICET,ESGIC) -40 mg per tablet Take 1 tablet by mouth daily as needed for headaches 90 tablet 0    cilostazol (PLETAL) 100 mg tablet Take 1 tablet (100 mg total) by mouth 2 (two) times a day 60 tablet 3    clopidogrel (Plavix) 75 mg tablet Take 1 tablet (75 mg total) by mouth daily 30 tablet 3    Dutasteride-Tamsulosin HCl 0.5-0.4 MG CAPS Take 1 capsule by mouth daily      levothyroxine 75 mcg tablet Take 1 tablet (75 mcg total) by mouth daily 90 tablet 3    lisinopril-hydrochlorothiazide (PRINZIDE,ZESTORETIC) 20-25 MG per tablet TAKE 1 TABLET DAILY 90 tablet 3    niacin 500 mg tablet Take 500 mg by mouth daily with breakfast      sertraline (ZOLOFT) 50 mg tablet Take 1 tablet (50 mg total) by mouth daily 90 tablet 1     No current facility-administered medications for this visit.      Allergies   Allergen Reactions    Aspirin      Other reaction(s): Blood Disorders, rectal bleeding/friable prostate  Category: Adverse Reaction;       Immunizations:     Immunization History   Administered Date(s) Administered    COVID-19 PFIZER VACCINE 0.3 ML IM 01/25/2021, 02/15/2021, 10/02/2021    H1N1, All Formulations 01/03/2010    INFLUENZA 09/28/2018, 09/16/2020    Influenza Split High Dose Preservative Free IM 09/16/2014, 09/29/2015, 09/29/2016    Influenza, seasonal, injectable 10/01/2012    Pneumococcal Conjugate 13-Valent 09/29/2015    Pneumococcal Polysaccharide PPV23 11/15/2010    Tdap 12/01/2012    Varicella 01/01/2013    Zoster 01/01/2013      Health Maintenance:         Topic Date Due    Colorectal Cancer Screening  03/28/2023         Topic Date Due    COVID-19 Vaccine (4 - Pfizer series) 11/27/2021    Influenza Vaccine (1) 09/01/2023      Medicare Screening Tests and Risk Assessments:     Adrian Hutton is here for his Subsequent Wellness visit. Health Risk Assessment:   Patient rates overall health as good. Patient feels that their physical health rating is same. Patient is satisfied with their life. Eyesight was rated as same. Hearing was rated as same. Patient feels that their emotional and mental health rating is same. Patients states they are sometimes angry. Patient states they are sometimes unusually tired/fatigued. Pain experienced in the last 7 days has been some. Patient's pain rating has been 5/10. Patient states that he has experienced no weight loss or gain in last 6 months. Depression Screening:   PHQ-2 Score: 0      Fall Risk Screening: In the past year, patient has experienced: no history of falling in past year      Home Safety:  Patient does not have trouble with stairs inside or outside of their home. Patient has working smoke alarms and has working carbon monoxide detector. Home safety hazards include: none. Nutrition:   Current diet is Limited junk food and Regular. Medications:   Patient is currently taking over-the-counter supplements. OTC medications include: see medication list. Patient is able to manage medications. Activities of Daily Living (ADLs)/Instrumental Activities of Daily Living (IADLs):   Walk and transfer into and out of bed and chair?: Yes  Dress and groom yourself?: Yes    Bathe or shower yourself?: Yes    Feed yourself? Yes  Do your laundry/housekeeping?: Yes  Manage your money, pay your bills and track your expenses?: Yes  Make your own meals?: Yes    Do your own shopping?: Yes    Previous Hospitalizations:   Any hospitalizations or ED visits within the last 12 months?: No      Advance Care Planning:   Living will: Yes    Durable POA for healthcare: Yes    Advanced directive: Yes    End of Life Decisions reviewed with patient: Yes    Provider agrees with end of life decisions: Yes      Cognitive Screening:   Provider or family/friend/caregiver concerned regarding cognition?: Yes    Cognition Comments: FU neurology    PREVENTIVE SCREENINGS      Cardiovascular Screening:    General: History Lipid Disorder and Screening Current      Diabetes Screening:     General: Screening Current      Colorectal Cancer Screening:     General: Screening Not Indicated      Prostate Cancer Screening:    General: Screening Not Indicated      Abdominal Aortic Aneurysm (AAA) Screening:    Risk factors include: tobacco use        Lung Cancer Screening:     General: Screening Not Indicated    Screening, Brief Intervention, and Referral to Treatment (SBIRT)    Screening  Typical number of drinks in a day: 1  Typical number of drinks in a week: 7  Interpretation: Low risk drinking behavior. Single Item Drug Screening:  How often have you used an illegal drug (including marijuana) or a prescription medication for non-medical reasons in the past year? never    Single Item Drug Screen Score: 0  Interpretation: Negative screen for possible drug use disorder    No results found.      Physical Exam:     /62   Pulse 80   Temp 97.8 °F (36.6 °C) (Temporal)   Resp 16   Ht 5' 8" (1.727 m)   Wt 73.9 kg (163 lb)   SpO2 96%   BMI 24.78 kg/m²     Physical Exam  Vitals reviewed. Constitutional:       Appearance: Normal appearance. HENT:      Head: Normocephalic and atraumatic. Eyes:      General:         Right eye: No discharge. Left eye: No discharge. Conjunctiva/sclera: Conjunctivae normal.   Neck:      Vascular: No carotid bruit. Cardiovascular:      Rate and Rhythm: Normal rate and regular rhythm. Heart sounds: Normal heart sounds. No murmur heard. No friction rub. No gallop. Pulmonary:      Effort: Pulmonary effort is normal. No respiratory distress. Breath sounds: Normal breath sounds. No wheezing or rales. Abdominal:      General: Bowel sounds are normal. There is no distension. Palpations: Abdomen is soft. Tenderness: There is no abdominal tenderness. Musculoskeletal:         General: No swelling, tenderness or deformity. Normal range of motion. Cervical back: Normal range of motion and neck supple. No muscular tenderness. Lymphadenopathy:      Cervical: No cervical adenopathy. Neurological:      Mental Status: He is alert.    Psychiatric:         Mood and Affect: Mood normal.          Ashley Bella MD

## 2023-10-13 NOTE — PATIENT INSTRUCTIONS
Medicare Preventive Visit Patient Instructions  Thank you for completing your Welcome to Medicare Visit or Medicare Annual Wellness Visit today. Your next wellness visit will be due in one year (10/13/2024). The screening/preventive services that you may require over the next 5-10 years are detailed below. Some tests may not apply to you based off risk factors and/or age. Screening tests ordered at today's visit but not completed yet may show as past due. Also, please note that scanned in results may not display below. Preventive Screenings:  Service Recommendations Previous Testing/Comments   Colorectal Cancer Screening  Colonoscopy    Fecal Occult Blood Test (FOBT)/Fecal Immunochemical Test (FIT)  Fecal DNA/Cologuard Test  Flexible Sigmoidoscopy Age: 43-73 years old   Colonoscopy: every 10 years (May be performed more frequently if at higher risk)  OR  FOBT/FIT: every 1 year  OR  Cologuard: every 3 years  OR  Sigmoidoscopy: every 5 years  Screening may be recommended earlier than age 39 if at higher risk for colorectal cancer. Also, an individualized decision between you and your healthcare provider will decide whether screening between the ages of 77-80 would be appropriate.  Colonoscopy: 03/28/2018  FOBT/FIT: Not on file  Cologuard: Not on file  Sigmoidoscopy: Not on file          Prostate Cancer Screening Individualized decision between patient and health care provider in men between ages of 53-66   Medicare will cover every 12 months beginning on the day after your 50th birthday PSA: No results in last 5 years           Hepatitis C Screening Once for adults born between 80 and 1965  More frequently in patients at high risk for Hepatitis C Hep C Antibody: Not on file        Diabetes Screening 1-2 times per year if you're at risk for diabetes or have pre-diabetes Fasting glucose: No results in last 5 years (No results in last 5 years)  A1C: 5.8 % (7/11/2023)      Cholesterol Screening Once every 5 years if you don't have a lipid disorder. May order more often based on risk factors. Lipid panel: 07/11/2023         Other Preventive Screenings Covered by Medicare:  Abdominal Aortic Aneurysm (AAA) Screening: covered once if your at risk. You're considered to be at risk if you have a family history of AAA or a male between the age of 70-76 who smoking at least 100 cigarettes in your lifetime. Lung Cancer Screening: covers low dose CT scan once per year if you meet all of the following conditions: (1) Age 48-67; (2) No signs or symptoms of lung cancer; (3) Current smoker or have quit smoking within the last 15 years; (4) You have a tobacco smoking history of at least 20 pack years (packs per day x number of years you smoked); (5) You get a written order from a healthcare provider. Glaucoma Screening: covered annually if you're considered high risk: (1) You have diabetes OR (2) Family history of glaucoma OR (3)  aged 48 and older OR (3)  American aged 72 and older  Osteoporosis Screening: covered every 2 years if you meet one of the following conditions: (1) Have a vertebral abnormality; (2) On glucocorticoid therapy for more than 3 months; (3) Have primary hyperparathyroidism; (4) On osteoporosis medications and need to assess response to drug therapy. HIV Screening: covered annually if you're between the age of 14-79. Also covered annually if you are younger than 13 and older than 72 with risk factors for HIV infection. For pregnant patients, it is covered up to 3 times per pregnancy.     Immunizations:  Immunization Recommendations   Influenza Vaccine Annual influenza vaccination during flu season is recommended for all persons aged >= 6 months who do not have contraindications   Pneumococcal Vaccine   * Pneumococcal conjugate vaccine = PCV13 (Prevnar 13), PCV15 (Vaxneuvance), PCV20 (Prevnar 20)  * Pneumococcal polysaccharide vaccine = PPSV23 (Pneumovax) Adults 61-01 yo with certain risk factors or if 65+ yo  If never received any pneumonia vaccine: recommend Prevnar 21 (PCV20)  Give PCV20 if previously received 1 dose of PCV13 or PPSV23   Hepatitis B Vaccine 3 dose series if at intermediate or high risk (ex: diabetes, end stage renal disease, liver disease)   Respiratory syncytial virus (RSV) Vaccine - COVERED BY MEDICARE PART D  * RSVPreF3 (Arexvy) CDC recommends that adults 61years of age and older may receive a single dose of RSV vaccine using shared clinical decision-making (SCDM)   Tetanus (Td) Vaccine - COST NOT COVERED BY MEDICARE PART B Following completion of primary series, a booster dose should be given every 10 years to maintain immunity against tetanus. Td may also be given as tetanus wound prophylaxis. Tdap Vaccine - COST NOT COVERED BY MEDICARE PART B Recommended at least once for all adults. For pregnant patients, recommended with each pregnancy. Shingles Vaccine (Shingrix) - COST NOT COVERED BY MEDICARE PART B  2 shot series recommended in those 19 years and older who have or will have weakened immune systems or those 50 years and older     Health Maintenance Due:      Topic Date Due   • Colorectal Cancer Screening  03/28/2023     Immunizations Due:      Topic Date Due   • COVID-19 Vaccine (4 - Pfizer series) 11/27/2021   • Influenza Vaccine (1) 09/01/2023     Advance Directives   What are advance directives? Advance directives are legal documents that state your wishes and plans for medical care. These plans are made ahead of time in case you lose your ability to make decisions for yourself. Advance directives can apply to any medical decision, such as the treatments you want, and if you want to donate organs. What are the types of advance directives? There are many types of advance directives, and each state has rules about how to use them. You may choose a combination of any of the following:  Living will: This is a written record of the treatment you want.  You can also choose which treatments you do not want, which to limit, and which to stop at a certain time. This includes surgery, medicine, IV fluid, and tube feedings. Durable power of  for [facility]): This is a written record that states who you want to make healthcare choices for you when you are unable to make them for yourself. This person, called a proxy, is usually a family member or a friend. You may choose more than 1 proxy. Do not resuscitate (DNR) order:  A DNR order is used in case your heart stops beating or you stop breathing. It is a request not to have certain forms of treatment, such as CPR. A DNR order may be included in other types of advance directives. Medical directive: This covers the care that you want if you are in a coma, near death, or unable to make decisions for yourself. You can list the treatments you want for each condition. Treatment may include pain medicine, surgery, blood transfusions, dialysis, IV or tube feedings, and a ventilator (breathing machine). Values history: This document has questions about your views, beliefs, and how you feel and think about life. This information can help others choose the care that you would choose. Why are advance directives important? An advance directive helps you control your care. Although spoken wishes may be used, it is better to have your wishes written down. Spoken wishes can be misunderstood, or not followed. Treatments may be given even if you do not want them. An advance directive may make it easier for your family to make difficult choices about your care. © Copyright [employer] 2018 Information is for End User's use only and may not be sold, redistributed or otherwise used for commercial purposes.  All illustrations and images included in CareNotes® are the copyrighted property of [employer] or  [employer]

## 2023-11-16 ENCOUNTER — OFFICE VISIT (OUTPATIENT)
Dept: NEUROLOGY | Facility: CLINIC | Age: 78
End: 2023-11-16
Payer: COMMERCIAL

## 2023-11-16 VITALS
HEART RATE: 92 BPM | BODY MASS INDEX: 24.97 KG/M2 | WEIGHT: 164.2 LBS | DIASTOLIC BLOOD PRESSURE: 62 MMHG | SYSTOLIC BLOOD PRESSURE: 132 MMHG
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
## 2023-11-16 DIAGNOSIS — F03.A4 MILD DEMENTIA WITH ANXIETY, UNSPECIFIED DEMENTIA TYPE (HCC): Primary | ICD-10-CM

## 2023-11-16 PROCEDURE — 99214 OFFICE O/P EST MOD 30 MIN: CPT | Performed by: PHYSICIAN ASSISTANT

## 2023-11-16 NOTE — PATIENT INSTRUCTIONS
Cognitive symptoms overall stable. He feels he is functioning fairly well. He continues to perform his ADLs independently. Driving without issues. On exam today he scored a 22/30 on memory testing (21/30 at prior visit a year ago). He does continue to manage his medications although there was some concern regarding this at his recent hospital stay. He does however state that his wife assists. It appears his wife stopped the Aricept a few months ago given no clear benefits and question of some behavior changes. Per the patient he is no longer taking the Aricept or the Zoloft which was stopped while inpatient. Given his overall stability and potential side effect to Aricept, I do not feel an additional medication for memory is warranted at this time. If however there is progression we could certainly consider a trial of rivastigmine or Namenda. Patient was encouraged to increase mind stimulating activities such as reading, crosswords, word searches, puzzles, Soduku, solitaire, coloring and other brain games. We also discussed the importance of staying physically active and eating a health diet such as the Mediterranean or MIND diet. We also reviewed his recent hospitalization following a fall at home. At that time it was felt that the fall was likely related to taking a combination of medications including Ambien, Tylenol PM along with wine before bed. He states he is no longer taking sleep aids or the Xanax. He does however continue to drink 1 glass of wine before bed. We did discuss the importance of trying to limit his alcohol intake and continuing to avoid taking additional medications along with alcohol. He states while inpatient he was getting melatonin which I feel would be appropriate if he wanted to try something for sleep. He could take 3 mg or 5 mg before bed.

## 2023-11-16 NOTE — ASSESSMENT & PLAN NOTE
Cognitive symptoms overall stable. He feels he is functioning fairly well. He continues to perform his ADLs independently. Driving without issues. On exam today he scored a 22/30 on memory testing (21/30 at prior visit a year ago). He does continue to manage his medications although there was some concern regarding this at his recent hospital stay. He does however state that his wife assists. It appears his wife stopped the Aricept a few months ago given no clear benefits and question of some behavior changes. Per the patient he is no longer taking the Aricept or the Zoloft which was stopped while inpatient. Given his overall stability and potential side effect to Aricept, I do not feel an additional medication for memory is warranted at this time. If however there is progression we could certainly consider a trial of rivastigmine or Namenda. Patient was encouraged to increase mind stimulating activities such as reading, crosswords, word searches, puzzles, Soduku, solitaire, coloring and other brain games. We also discussed the importance of staying physically active and eating a health diet such as the Mediterranean or MIND diet.

## 2023-11-16 NOTE — PROGRESS NOTES
Patient ID: Jaswant Escamilla is a 78 y.o. male. Assessment/Plan:    Mild dementia (HCC)  Cognitive symptoms overall stable. He feels he is functioning fairly well. He continues to perform his ADLs independently. Driving without issues. On exam today he scored a 22/30 on memory testing (21/30 at prior visit a year ago). He does continue to manage his medications although there was some concern regarding this at his recent hospital stay. He does however state that his wife assists. It appears his wife stopped the Aricept a few months ago given no clear benefits and question of some behavior changes. Per the patient he is no longer taking the Aricept or the Zoloft which was stopped while inpatient. Given his overall stability and potential side effect to Aricept, I do not feel an additional medication for memory is warranted at this time. If however there is progression we could certainly consider a trial of rivastigmine or Namenda. Patient was encouraged to increase mind stimulating activities such as reading, crosswords, word searches, puzzles, Soduku, solitaire, coloring and other brain games. We also discussed the importance of staying physically active and eating a health diet such as the Mediterranean or MIND diet. Fall  We also reviewed his recent hospitalization following a fall at home. At that time it was felt that the fall was likely related to taking a combination of medications including Ambien, Tylenol PM along with wine before bed. He states he is no longer taking sleep aids or the Xanax. He does however continue to drink 1 glass of wine before bed. We did discuss the importance of trying to limit his alcohol intake and continuing to avoid taking additional medications along with alcohol. He states while inpatient he was getting melatonin which I feel would be appropriate if he wanted to try something for sleep. He could take 3 mg or 5 mg before bed.       Subjective:     Alida Jacobs is a male with mild dementia,who presents for follow up. To review, wife reports symptoms began gradually in 2011 after long term with slow progression. He was described as having behavioral issues, being racist and possessive. He has short and long term memory impairment. He is on Prozac for OCD and takes Xanax prn. His mother had dementia in her 80's and had OCD. He also has a history of frontal headaches for which he takes Fiorcet about 5 times a week. At his last visit in 2022 he was stable, scored 21/30 on MoCA. NO changes made. INTERVAL HISTORY:  He was in the hospital following a fall at home. Per the notes felt this was secondary to taking Ambien, Tylenol PM and wine before bed. Wife stopped Aricept in 5/2023 given she felt his memory was worse   6/10/2023 TSH 2.172, vitamin B12 941  He states that he stood up in the AM and fell to the floor, states he felt that his legs gave out   No dizziness or lightheadedness    He no longer takes Xanax or Tylenol PM  He does still drink a glass of wine before bed     Overall feels that he is stable   He reside with his wife and son   He is still driving without any issues   He is still managing his medications, wife does watch them as well  He can perform all of his ADLs on his own   He feels that he is sleeping well with a glass of wine, has been doing this for the past 4 years   He has a good bedtime routine, he will watch the news and then read before bed   No hallucinations   No longer taking the Zoloft or the Aricept   Headaches have been stable, has not needed to use the Fioricet in some time     Prior workup:  MRI revealed mild atrophy with early microvascular disease and neuroquant imaging consistent with neurodegenerative process. Neuropsych testing was consistent with mild cognitive impairment with no clinically significant anxiety or depression. I personally reviewed and updated the ROS.       Objective:    Blood pressure 132/62, pulse 92, weight 74.5 kg (164 lb 3.2 oz). Physical Exam  Constitutional:       General: He is awake. HENT:      Right Ear: Hearing normal.      Left Ear: Hearing normal.   Eyes:      Extraocular Movements: EOM normal.      Pupils: Pupils are equal, round, and reactive to light. Neurological:      Mental Status: He is alert. Motor: Motor strength is normal.  Psychiatric:         Speech: Speech normal.         Neurological Exam  Mental Status  Awake and alert. Oriented to person, place, time and situation. Recalls 3 of 3 objects immediately. (Delayed recall 1/5) Unable to copy figure. Clock drawing is normal. Speech is normal. Able to repeat. Able to perform serial calculations. MOCA 22/30 - 11/16/23    MoCA 21/30 - 11/16/22  MoCA 21/30 - 11/18/21  MoCA 17/30 - 4/15/21  MoCA 17/30 - 10/15/20  MoCA 23/30 - 10/9/19   . Cranial Nerves  CN III, IV, VI: Extraocular movements intact bilaterally. Pupils equal round and reactive to light bilaterally. CN V:  Right: Facial sensation is normal.  Left: Facial sensation is normal on the left. CN VII:  Right: There is no facial weakness. Left: There is no facial weakness. CN VIII:  Right: Hearing is normal.  Left: Hearing is normal.  CN IX, X: Palate elevates symmetrically  CN XI: Shoulder shrug strength is normal.  CN XII: Tongue midline without atrophy or fasciculations. Motor   Strength is 5/5 throughout all four extremities. Sensory  Light touch is normal in upper and lower extremities. Reflexes  Glabellar tap absent. Coordination  Right: Finger-to-nose normal.Left: Finger-to-nose normal.    Gait  Casual gait is normal including stance, stride, and arm swing. Able to rise from chair without using arms. ROS:    Review of Systems   Constitutional:  Negative for chills. HENT:  Negative for congestion. Eyes:  Negative for discharge and itching. Respiratory:  Negative for chest tightness.     Cardiovascular:  Negative for chest pain.   Gastrointestinal:  Negative for abdominal pain. Endocrine: Negative for cold intolerance and heat intolerance. Genitourinary:  Negative for difficulty urinating. Musculoskeletal:  Positive for myalgias. Negative for back pain. Skin:  Negative for color change. Allergic/Immunologic: Negative for environmental allergies. Neurological:  Negative for dizziness. Hematological:  Does not bruise/bleed easily. Psychiatric/Behavioral:  Negative for agitation and hallucinations.

## 2023-11-16 NOTE — ASSESSMENT & PLAN NOTE
We also reviewed his recent hospitalization following a fall at home. At that time it was felt that the fall was likely related to taking a combination of medications including Ambien, Tylenol PM along with wine before bed. He states he is no longer taking sleep aids or the Xanax. He does however continue to drink 1 glass of wine before bed. We did discuss the importance of trying to limit his alcohol intake and continuing to avoid taking additional medications along with alcohol. He states while inpatient he was getting melatonin which I feel would be appropriate if he wanted to try something for sleep. He could take 3 mg or 5 mg before bed.

## 2023-11-21 ENCOUNTER — CONSULT (OUTPATIENT)
Dept: VASCULAR SURGERY | Facility: CLINIC | Age: 78
End: 2023-11-21
Payer: COMMERCIAL

## 2023-11-21 VITALS
BODY MASS INDEX: 24.86 KG/M2 | SYSTOLIC BLOOD PRESSURE: 152 MMHG | WEIGHT: 164 LBS | OXYGEN SATURATION: 97 % | DIASTOLIC BLOOD PRESSURE: 80 MMHG | HEART RATE: 79 BPM | HEIGHT: 68 IN

## 2023-11-21 DIAGNOSIS — I73.9 PAD (PERIPHERAL ARTERY DISEASE) (HCC): ICD-10-CM

## 2023-11-21 PROCEDURE — 99203 OFFICE O/P NEW LOW 30 MIN: CPT | Performed by: NURSE PRACTITIONER

## 2023-11-21 RX ORDER — ATORVASTATIN CALCIUM 80 MG/1
80 TABLET, FILM COATED ORAL DAILY
Qty: 30 TABLET | Refills: 3 | Status: SHIPPED | OUTPATIENT
Start: 2023-11-21

## 2023-11-21 NOTE — PATIENT INSTRUCTIONS
Start a walking routine  Stop Plavix or clopidogrel  Continue cilostazol/Pletal  Stop drinking wine at night with medications  Will evaluate arterial perfusion with aortoiliac and lower extremity arterial duplex  Follow-up in the office in 2 months to recheck symptoms with walking and review studies    Peripheral Artery Disease   WHAT YOU NEED TO KNOW:   What is peripheral artery disease (PAD)? PAD is narrow, weak, or blocked arteries. It may affect any arteries outside of your heart and brain. PAD is usually the result of a buildup of fat and cholesterol, also called plaque, along your artery walls. Inflammation, a blood clot, or abnormal cell growth could also block your arteries. PAD prevents normal blood flow to your legs and arms. You are at risk of an amputation if poor blood flow keeps wounds from healing or causes gangrene (tissue death). Without treatment, PAD can also cause a heart attack or stroke. What increases my risk for PAD? Smoking cigarettes    Diabetes    High blood pressure    High cholesterol    Age older than 40 years    Heart disease or a family history of heart disease    What are the signs and symptoms of PAD? Mild PAD usually does not cause symptoms. As the disease worsens over time, you may have the following:  Pain or cramps in your leg or hip while you walk    A numb, weak, or heavy feeling in your legs    Dry, scaly, red, or pale skin on your legs    Thick or brittle nails, or hair loss on your arms and legs    Foot sores that will not heal    Burning or aching in your feet and toes while resting (this may be worse when you lie down)    How is PAD diagnosed? Angiography  is a test that shows pictures of the arteries in your arms and legs. You will be given contrast liquid to help the arteries show up better on the pictures. The pictures will be taken with an MRI or CT scan. Tell the healthcare provider if you have ever had an allergic reaction to contrast liquid.  Do not enter the MRI room with anything metal. Metal can cause serious injury. Tell a healthcare provider if you have any metal in or on your body. Doppler ankle brachial index (SAUMYA)  is a test that compares blood pressure in your ankles to blood pressure in your arms. This tells your healthcare provider how well blood is flowing through the arteries in your legs. How is PAD treated? Treatment can help reduce your risk of a heart attack, stroke, or amputation. You may need more than one of the following:  Medicines  may be given:    Antiplatelets , such as aspirin, help prevent blood clots. Take your antiplatelet medicine exactly as directed. These medicines make it more likely for you to bleed or bruise. If you are told to take aspirin, do not take acetaminophen or ibuprofen instead. Statin medicine  helps lower your cholesterol and prevents PAD from getting worse. A supervised exercise program  helps you stay active in normal daily activities. Healthcare providers will help you safely walk or do strength training exercises 3 times a week for 30 to 60 minutes. You will do this for several months, then transition to walking on your own. Angioplasty  is a procedure to open your artery so blood can flow through normally. A thin tube called a catheter is used to insert a small balloon into your artery. The balloon is inflated to open your blocked artery, and then removed. A tube called a stent may be placed in your artery to hold it open. Bypass surgery  is used to make a new connection to your artery with a vein from another part of your body, or an artificial graft. The vein or graft is attached to your artery above and below your blockage. This allows blood to flow around the blocked portion of your artery. How can I manage PAD? Do not smoke. Nicotine and other chemicals in cigarettes and cigars can worsen PAD. They can also increase your risk for a heart attack or stroke.  Ask your healthcare provider for information if you currently smoke and need help to quit. E-cigarettes or smokeless tobacco still contain nicotine. Talk to your healthcare provider before you use these products. Manage other health conditions. Take your medicines as directed. Follow your healthcare provider's instructions if you have high blood pressure or high cholesterol. Perform foot care and check your blood sugar levels as directed if you have diabetes. Eat heart-healthy foods. Eat whole grains, fruits, and vegetables every day. Limit salt and high-fat foods. Ask your healthcare provider for more information on a heart healthy diet. Ask what a healthy weight is for you. Your healthcare provider can help you create a healthy weight-loss plan, if needed. Call your local emergency number (911 in the [address]) if:   You have any of the following signs of a heart attack:      Squeezing, pressure, or pain in your chest    You may  also have any of the following:     Discomfort or pain in your back, neck, jaw, stomach, or arm    Shortness of breath    Nausea or vomiting    Lightheadedness or a sudden cold sweat    You have any of the following signs of a stroke:      Numbness or drooping on one side of your face     Weakness in an arm or leg    Confusion or difficulty speaking    Dizziness, a severe headache, or vision loss    When should I seek immediate care? You have sores or wounds that will not heal.    You notice black or discolored skin on your arm or leg. Your skin is cool to the touch. When should I call my doctor? You have leg pain when you walk 1/8 mile (200 meters) or less, even with treatment. Your legs are red, dry, or pale, even with treatment. You have questions or concerns about your condition or care. CARE AGREEMENT:   You have the right to help plan your care. Learn about your health condition and how it may be treated.  Discuss treatment options with your healthcare providers to decide what care you want to receive. You always have the right to refuse treatment. The above information is an  only. It is not intended as medical advice for individual conditions or treatments. Talk to your doctor, nurse or pharmacist before following any medical regimen to see if it is safe and effective for you. © Copyright [employer]ajan 2023 Information is for End User's use only and may not be sold, redistributed or otherwise used for commercial purposes.

## 2023-11-21 NOTE — PROGRESS NOTES
Assessment/Plan:    PAD (peripheral artery disease) ([address] )  78-year-old male with HTN, HLD, moderate aortic stenosis, hypothyroid, anxiety, mild dementia, osteoarthritis, alcohol use. Patient referred to the office for PAD     Patient had several falls secondary to leg weakness, hospitalized 6/10-6/13 after fall. Evaluated with SAUMYA for leg weakness 10/2/23 which showed significantly decreased R SAUMYA 0.62 and L SAUMYA 1.17. Patient started on Plavix/Pletal and statin by cardiology     LEAD showed R SAUMYA 0.61/94/42 and L SAUMYA 1.17/175/110  Easily palpable Left pedal pulses and right pedal pulses non palpable, dopplerable   Patient is sedentary at baseline and not walking enough to develop claudication symptoms. He denies any notable difference in either extremity. Left medial proximal calf cluster of bulky varicosities  After discussion with patient and wife it seems falls/leg weakness most likely related to drinking a large (12oz) glass of wine at night and taking medications at night. He falls getting up out of bed to get to the bathroom     Plan:  -Start a walking routine. Referred to PT for PAD walking program  -Continue cilostazol/Pletal since he will be starting a walking routine. He was taking daily and instructed to take BID  -Stop Plavix or clopidogrel secondary to excessive bleeding after most recent fall. Frequent falls   -Stop drinking wine at night with medications  -Will evaluate arterial perfusion with aortoiliac and lower extremity arterial duplex  -Follow-up in the office in 2 months to recheck symptoms with walking and review studies       Diagnoses and all orders for this visit:    PAD (peripheral artery disease) ([address] )  -     Ambulatory Referral to Vascular Surgery  -     Ambulatory Referral to Peripheral Artery Disease Supervised Exercise Therapy; Future  -     VAS abdominal aorta/iliacs; complete study;  Future  -     VAS lower limb arterial duplex, complete bilateral; Future  -     atorvastatin (LIPITOR) 80 mg tablet; Take 1 tablet (80 mg total) by mouth daily      Subjective:      Patient ID: Kailash Tracy is a 78 y.o. male. Patient presents for evaluation of lower extremities. He had an SAUMYA & waveform analysis done 10/2/2023. He reports thigh pain and weakness of bilateral lower extremities. He is taking Atorvastatin, Pletal, and Plavix. HPI  47-year-old male with HTN, HLD, moderate aortic stenosis, hypothyroid, anxiety, mild dementia, osteoarthritis, alcohol use. Patient referred to the office for PAD. For the past several months he has had leg weakness at night causing him to fall upon standing and was found several times crawling out of his bedroom. Patient had several falls secondary to leg weakness, hospitalized 6/10-6/13 after fall. Evaluated with SAUMYA for leg weakness 10/2/23 which showed significantly decreased R SAUMYA 0.62 and L SAUMYA 1.17. Patient started on Plavix/Pletal and statin by cardiology. He does note after last fall he had significant bleeding. He is quite sedentary and sits for most of his day. He denies any claudication symptoms. No ischemic rest pain or ischemic tissue loss. He does have varicosities left medial proximal calf which are without any symptoms of heaviness, throbbing, aching. After lengthy discussion it seems he drinks a large 12 ounce glass of wine at night and then takes his evening medications. The following portions of the patient's history were reviewed and updated as appropriate: allergies, current medications, past family history, past medical history, past social history, past surgical history, and problem list.  ROS reviewed     Review of Systems   Constitutional: Negative. HENT: Negative. Eyes: Negative. Respiratory: Negative. Cardiovascular: Negative. Gastrointestinal: Negative. Endocrine: Negative. Genitourinary: Negative. Musculoskeletal: Negative. Skin: Negative. Allergic/Immunologic: Negative.     Neurological: Negative. Hematological: Negative. Psychiatric/Behavioral: Negative. Objective:    I have reviewed and made appropriate changes to the review of systems input by the medical assistant. Vitals:    11/21/23 1434   BP: 152/80   BP Location: Left arm   Patient Position: Sitting   Cuff Size: Standard   Pulse: 79   SpO2: 97%   Weight: 74.4 kg (164 lb)   Height: 5' 8" (1.727 m)       Patient Active Problem List   Diagnosis    Marital dysfunction    Anxiety    Benign prostatic hyperplasia    Cataract    Erectile dysfunction of non-organic origin    Hearing loss    Hyperlipidemia    Hypothyroidism    Impaired fasting glucose    Macrocytosis    Memory loss    Osteoarthritis    Primary localized osteoarthrosis of the hip    Tension type headache    PROVISIONAL Obsessive compulsive personality disorder    BMI 25.0-25.9,adult    Chronic intractable headache    Mild dementia (HCC)    Essential hypertension    Alcohol use    Osteochondroma of femur    Primary localized osteoarthritis of pelvic region and thigh    Insomnia    Fall    Aortic stenosis, moderate    Troponin I above reference range    Ambulatory dysfunction    PAOLA (acute kidney injury) ([address])    PAD (peripheral artery disease) (HCC)       Past Surgical History:   Procedure Laterality Date    CATARACT EXTRACTION Left     Dr. Isidoro Jeffries; onset: 8/6/13    COLONOSCOPY      3/20/13, normal clon - Dr. Lindsey Christopher, repeat in 5-10 years    HIP SURGERY      [place] COLONOSCOPY FLX DX W[facility] WHEN PFRMD N/A 3/28/2018    Procedure: COLONOSCOPY;  Surgeon: Vijaya Wisdom MD;  Location: [facility];   Service: Colorectal    THYROIDECTOMY, PARTIAL      TONSILLECTOMY      TOTAL HIP ARTHROPLASTY         Family History   Problem Relation Age of Onset    Diabetes Father     Lung cancer Father     Alcohol abuse Paternal Uncle     Heart disease Paternal Uncle        Social History     Socioeconomic History    Marital status: /Civil Union     Spouse name: Not on file Number of children: Not on file    Years of education: completed some college    Highest education level: Not on file   Occupational History    Not on file   Tobacco Use    Smoking status: Former    Smokeless tobacco: Never   Substance and Sexual Activity    Alcohol use: Yes     Alcohol/week: 2.0 standard drinks of alcohol     Types: 2 Glasses of wine per week     Comment: 12 oz every night    Drug use: No    Sexual activity: Not on file   Other Topics Concern    Not on file   Social History Narrative    Caffeine use    Marital relationship problem     Social Determinants of Health     Financial Resource Strain: Not on file   Food Insecurity: No Food Insecurity (6/11/2023)    Hunger Vital Sign     Worried About Running Out of Food in the Last Year: Never true     Ran Out of Food in the Last Year: Never true   Transportation Needs: No Transportation Needs (6/11/2023)    PRAPARE - Transportation     Lack of Transportation (Medical): No     Lack of Transportation (Non-Medical): No   Physical Activity: Not on file   Stress: Not on file   Social Connections: Not on file   Intimate Partner Violence: Not on file   Housing Stability: Low Risk  (6/11/2023)    Housing Stability Vital Sign     Unable to Pay for Housing in the Last Year: No     Number of Places Lived in the Last Year: 1     Unstable Housing in the Last Year: No       Allergies   Allergen Reactions    Aspirin      Other reaction(s): Blood Disorders, rectal bleeding/friable prostate  Category:  Adverse Reaction;          Current Outpatient Medications:     Acetaminophen (TYLENOL PO), Take by mouth as needed, Disp: , Rfl:     amLODIPine (NORVASC) 10 mg tablet, Take 1 tablet (10 mg total) by mouth daily, Disp: 90 tablet, Rfl: 1    atorvastatin (LIPITOR) 80 mg tablet, Take 1 tablet (80 mg total) by mouth daily, Disp: 30 tablet, Rfl: 3    butalbital-acetaminophen-caffeine (FIORICET,ESGIC) -40 mg per tablet, Take 1 tablet by mouth daily as needed for headaches, Disp: 90 tablet, Rfl: 0    cilostazol (PLETAL) 100 mg tablet, Take 1 tablet (100 mg total) by mouth 2 (two) times a day, Disp: 60 tablet, Rfl: 3    Dutasteride-Tamsulosin HCl 0.5-0.4 MG CAPS, Take 1 capsule by mouth daily, Disp: , Rfl:     levothyroxine 75 mcg tablet, Take 1 tablet (75 mcg total) by mouth daily, Disp: 90 tablet, Rfl: 3    niacin 500 mg tablet, Take 500 mg by mouth daily with breakfast, Disp: , Rfl:     sertraline (ZOLOFT) 50 mg tablet, Take 1 tablet (50 mg total) by mouth daily, Disp: 90 tablet, Rfl: 1    lisinopril-hydrochlorothiazide (PRINZIDE,ZESTORETIC) 20-25 MG per tablet, TAKE 1 TABLET DAILY, Disp: 90 tablet, Rfl: 3    /80 (BP Location: Left arm, Patient Position: Sitting, Cuff Size: Standard)   Pulse 79   Ht 5' 8" (1.727 m)   Wt 74.4 kg (164 lb)   SpO2 97%   BMI 24.94 kg/m²          Physical Exam  Vitals and nursing note reviewed. Exam conducted with a chaperone present. Constitutional:       Appearance: Normal appearance. He is normal weight. HENT:      Head: Normocephalic and atraumatic. Eyes:      Extraocular Movements: Extraocular movements intact. Neck:      Vascular: No carotid bruit. Cardiovascular:      Pulses:           Femoral pulses are 1+ on the right side and 2+ on the left side. Dorsalis pedis pulses are detected w/ Doppler on the right side and 2+ on the left side. Posterior tibial pulses are detected w/ Doppler on the right side and 2+ on the left side. Heart sounds: Normal heart sounds. Pulmonary:      Effort: Pulmonary effort is normal.   Abdominal:      General: Bowel sounds are normal.   Musculoskeletal:         General: No swelling. Normal range of motion. Comments: Left medial proximal calf cluster of bulky varicosities   Skin:     General: Skin is warm. Neurological:      General: No focal deficit present. Mental Status: He is alert and oriented to person, place, and time.    Psychiatric:         Mood and Affect: Mood normal.         Behavior: Behavior normal.

## 2023-12-04 DIAGNOSIS — I10 ESSENTIAL HYPERTENSION: ICD-10-CM

## 2023-12-04 RX ORDER — LISINOPRIL AND HYDROCHLOROTHIAZIDE 25; 20 MG/1; MG/1
TABLET ORAL
Qty: 90 TABLET | Refills: 3 | Status: SHIPPED | OUTPATIENT
Start: 2023-12-04

## 2023-12-07 NOTE — ASSESSMENT & PLAN NOTE
38-year-old male with HTN, HLD, moderate aortic stenosis, hypothyroid, anxiety, mild dementia, osteoarthritis, alcohol use. Patient referred to the office for PAD     Patient had several falls secondary to leg weakness, hospitalized 6/10-6/13 after fall. Evaluated with SAUMYA for leg weakness 10/2/23 which showed significantly decreased R SAUMYA 0.62 and L SAUMYA 1.17. Patient started on Plavix/Pletal and statin by cardiology     LEAD showed R SAUMYA 0.61/94/42 and L SAUMYA 1.17/175/110  Easily palpable Left pedal pulses and right pedal pulses non palpable, dopplerable   Patient is sedentary at baseline and not walking enough to develop claudication symptoms. He denies any notable difference in either extremity. Left medial proximal calf cluster of bulky varicosities  After discussion with patient and wife it seems falls/leg weakness most likely related to drinking a large (12oz) glass of wine at night and taking medications at night. He falls getting up out of bed to get to the bathroom     Plan:  -Start a walking routine. Referred to PT for PAD walking program  -Continue cilostazol/Pletal since he will be starting a walking routine. He was taking daily and instructed to take BID  -Stop Plavix or clopidogrel secondary to excessive bleeding after most recent fall.  Frequent falls   -Stop drinking wine at night with medications  -Will evaluate arterial perfusion with aortoiliac and lower extremity arterial duplex  -Follow-up in the office in 2 months to recheck symptoms with walking and review studies

## 2023-12-13 ENCOUNTER — TELEPHONE (OUTPATIENT)
Dept: VASCULAR SURGERY | Facility: CLINIC | Age: 78
End: 2023-12-13

## 2023-12-13 NOTE — TELEPHONE ENCOUNTER
----- Message from Nadir Pandya, [facility] sent at 12/12/2023  3:43 PM EST -----  Regarding: FW: PAD Rehab appt  I saw patient initially and referred for PAD walking program. Patient has follow up with me in January - can this appointment be switched to one of the docs. Needs face to face with physician to authorize PAD walking routine. Thanks,   Donalynn Blizzard   ----- Message -----  From: Teofilo Santacruz  Sent: 12/12/2023   9:41 AM EST  To: LOLLY John  Subject: PAD Rehab appt                                   Good Morning Donalynn Blizzard,     Your patient, Dee Hester, had a PAD rehab evaluation appt scheduled for today. However, according to Medicare requirements, "Beneficiaries must have a face-to-face visit with the physician responsible for PAD treatment to obtain the referral for SET. At this visit, the beneficiary must receive information regarding cardiovascular disease and PAD risk factor reduction, which could include education, counseling, behavioral interventions, and outcome assessments. ".     I did not see documentation of a physician visit and unfortunately, Medicare has not caught up with advanced practitioners when it comes to PAD rehab. Marta Arias is scheduled for LEAD and result review at the beginning of January. Is it possible to have a physician see Marta Hugo briefly on 1/9/24 and document the face to face?     Thank you,  Teofilo Santacruz MS, CEP

## 2023-12-26 DIAGNOSIS — I10 ESSENTIAL HYPERTENSION: ICD-10-CM

## 2023-12-27 DIAGNOSIS — I10 ESSENTIAL HYPERTENSION: Primary | ICD-10-CM

## 2023-12-27 RX ORDER — AMLODIPINE BESYLATE 10 MG/1
10 TABLET ORAL DAILY
Qty: 30 TABLET | Refills: 0 | Status: SHIPPED | OUTPATIENT
Start: 2023-12-27

## 2023-12-27 RX ORDER — AMLODIPINE BESYLATE 10 MG/1
10 TABLET ORAL DAILY
Qty: 90 TABLET | Refills: 1 | Status: SHIPPED | OUTPATIENT
Start: 2023-12-27

## 2024-01-01 NOTE — ASSESSMENT & PLAN NOTE
"Patient drinks a very large glass of wine nightly  · We will start thiamine, folic acid, multivitamin  · We will monitor on CIWA scale  · Patient is also prescribed concurrent benzodiazepine with Xanax  Wife is concerned about patient drinking wine and taking xanax every night before bed   States he \"too stubborn\" to make lifestyle changes   " VIEW ALL

## 2024-01-05 ENCOUNTER — HOSPITAL ENCOUNTER (OUTPATIENT)
Dept: NON INVASIVE DIAGNOSTICS | Facility: CLINIC | Age: 79
Discharge: HOME | End: 2024-01-05
Payer: COMMERCIAL

## 2024-01-05 DIAGNOSIS — I73.9 PAD (PERIPHERAL ARTERY DISEASE) (HCC): ICD-10-CM

## 2024-01-05 PROCEDURE — 93923 UPR/LXTR ART STDY 3+ LVLS: CPT

## 2024-01-05 PROCEDURE — 93925 LOWER EXTREMITY STUDY: CPT

## 2024-01-05 PROCEDURE — 93978 VASCULAR STUDY: CPT

## 2024-01-06 PROCEDURE — 93922 UPR/L XTREMITY ART 2 LEVELS: CPT | Performed by: SURGERY

## 2024-01-06 PROCEDURE — 93978 VASCULAR STUDY: CPT | Performed by: SURGERY

## 2024-01-06 PROCEDURE — 93925 LOWER EXTREMITY STUDY: CPT | Performed by: SURGERY

## 2024-01-16 ENCOUNTER — OFFICE VISIT (OUTPATIENT)
Dept: FAMILY MEDICINE CLINIC | Facility: CLINIC | Age: 79
End: 2024-01-16
Payer: COMMERCIAL

## 2024-01-16 ENCOUNTER — TELEPHONE (OUTPATIENT)
Dept: CARDIAC REHAB | Facility: CLINIC | Age: 79
End: 2024-01-16

## 2024-01-16 VITALS
SYSTOLIC BLOOD PRESSURE: 136 MMHG | HEIGHT: 68 IN | WEIGHT: 167.4 LBS | RESPIRATION RATE: 18 BRPM | DIASTOLIC BLOOD PRESSURE: 80 MMHG | TEMPERATURE: 96.6 F | HEART RATE: 89 BPM | BODY MASS INDEX: 25.37 KG/M2 | OXYGEN SATURATION: 97 %

## 2024-01-16 DIAGNOSIS — E03.9 HYPOTHYROIDISM, UNSPECIFIED TYPE: ICD-10-CM

## 2024-01-16 DIAGNOSIS — Z78.9 ALCOHOL USE: ICD-10-CM

## 2024-01-16 DIAGNOSIS — F41.9 ANXIETY: ICD-10-CM

## 2024-01-16 DIAGNOSIS — I73.9 PAD (PERIPHERAL ARTERY DISEASE) (HCC): Primary | ICD-10-CM

## 2024-01-16 PROBLEM — Z63.0 MARITAL DYSFUNCTION: Status: RESOLVED | Noted: 2018-02-16 | Resolved: 2024-01-16

## 2024-01-16 PROCEDURE — 99214 OFFICE O/P EST MOD 30 MIN: CPT | Performed by: FAMILY MEDICINE

## 2024-01-16 NOTE — PROGRESS NOTES
Name: Champ Nicole      : 1945      MRN: [id number]  Encounter Provider: Sanya Freeman MD  Encounter Date: 2024   Encounter department: [facility]  Chief Complaint   Patient presents with    Follow-up     3 month f/u      Health Maintenance   Topic Date Due    Zoster Vaccine (2 of 3) 2013    Influenza Vaccine (1) 2023    COVID-19 Vaccine (4 - - season) 2023    Falls: Plan of Care  2024    Fall Risk  10/13/2024    Depression Screening  10/13/2024    Medicare Annual Wellness Visit (AWV)  10/13/2024    Pneumococcal Vaccine: 65+ Years  Completed    HIB Vaccine  Aged Out    IPV Vaccine  Aged Out    Hepatitis A Vaccine  Aged Out    Meningococcal ACWY Vaccine  Aged Out    HPV Vaccine  Aged Out    Colorectal Cancer Screening  Discontinued     Assessment & Plan     1. PAD (peripheral artery disease) (HCC)  Assessment & Plan:  Continue statin and cilostazol per vascular surgeon.       2. Anxiety  Assessment & Plan:  Stable. Continue zoloft 50mg QD.       3. Alcohol use  Assessment & Plan:  Strongly advised pt to limit alcohol.       4. Hypothyroidism, unspecified type  Assessment & Plan:  2024 TSH normal. Continue levothyroxine 75mcg daily.          Reviewed lab in 2024  CBC ok  CMP ok, GFR 78 improved  hgA1C 5.9 stable  Lipid 116/60/63/41 improved  TSH normal  RTO in 6 months.       Subjective      HPI    Pt is here with his wife.     PAD---[facility] vascular surgeon. He is on cilostazol and statin.     Hyperlipidemia---He is on atorvastatin 80mg qhs. Denies SE.     Anxiety/OCD---He stopped prozac 80mg QD. He is on zoloft 50mg daily. Denies SE. Feels ok.      HTN---He is on lisinopril-HCTZ 20-25mg QD and amlodipine 10mg QD. Denies SE.   Denies headache, SOB, CP.      Hypothyroidism---He is on levothyroxine 75mcg daily. Feels fine.      IFG---follows low carb diet.     AS---[facility] cardiology. 2023 Echo done.       Tension headache---use fioricet as needed, not use it  everyday.  neurology yearly.   Mild dementia--- neurology. He is off Aricept because side effects.      BPH--- urology Dr. Alvarado for BPH. He is on Lolly which helped.       opthalmology yearly.      No smoking.   He drank 12 oz wine every night.      Lives with wife. Does all ADL's.   Denies recent falls.   Denies depression.       Review of Systems   Constitutional:  Negative for appetite change, chills and fever.   HENT:  Negative for congestion, ear pain, sinus pain and sore throat.    Eyes:  Negative for discharge and itching.   Respiratory:  Negative for apnea, cough, chest tightness, shortness of breath and wheezing.    Cardiovascular:  Negative for chest pain, palpitations and leg swelling.   Gastrointestinal:  Negative for abdominal pain, anal bleeding, constipation, diarrhea, nausea and vomiting.   Endocrine: Negative for cold intolerance, heat intolerance and polyuria.   Genitourinary:  Negative for difficulty urinating and dysuria.   Musculoskeletal:  Negative for arthralgias, back pain and myalgias.   Skin:  Negative for rash.   Neurological:  Negative for dizziness and headaches.   Psychiatric/Behavioral:  Negative for agitation.        Current Outpatient Medications on File Prior to Visit   Medication Sig    Acetaminophen (TYLENOL PO) Take by mouth as needed    amLODIPine (NORVASC) 10 mg tablet Take 1 tablet (10 mg total) by mouth daily    amLODIPine (NORVASC) 10 mg tablet Take 1 tablet (10 mg total) by mouth daily    atorvastatin (LIPITOR) 80 mg tablet Take 1 tablet (80 mg total) by mouth daily    butalbital-acetaminophen-caffeine (FIORICET,ESGIC) -40 mg per tablet Take 1 tablet by mouth daily as needed for headaches    cilostazol (PLETAL) 100 mg tablet Take 1 tablet (100 mg total) by mouth 2 (two) times a day    Dutasteride-Tamsulosin HCl 0.5-0.4 MG CAPS Take 1 capsule by mouth daily    levothyroxine 75 mcg tablet Take 1 tablet (75 mcg total) by mouth daily     "lisinopril-hydrochlorothiazide (PRINZIDE,ZESTORETIC) 20-25 MG per tablet TAKE 1 TABLET DAILY    niacin 500 mg tablet Take 500 mg by mouth daily with breakfast    sertraline (ZOLOFT) 50 mg tablet Take 1 tablet (50 mg total) by mouth daily       Objective     /80 (BP Location: Left arm, Patient Position: Sitting, Cuff Size: Adult)   Pulse 89   Temp (!) 96.6 °F (35.9 °C) (Temporal)   Resp 18   Ht 5' 8\" (1.727 m)   Wt 75.9 kg (167 lb 6.4 oz)   SpO2 97%   BMI 25.45 kg/m²     Physical Exam  Constitutional:       Appearance: He is well-developed.   HENT:      Head: Normocephalic and atraumatic.   Eyes:      General:         Right eye: No discharge.         Left eye: No discharge.      Conjunctiva/sclera: Conjunctivae normal.   Cardiovascular:      Rate and Rhythm: Normal rate and regular rhythm.      Heart sounds: Normal heart sounds. No murmur heard.     No friction rub. No gallop.   Pulmonary:      Effort: Pulmonary effort is normal. No respiratory distress.      Breath sounds: Normal breath sounds. No wheezing or rales.   Abdominal:      General: Bowel sounds are normal. There is no distension.      Palpations: Abdomen is soft.      Tenderness: There is no abdominal tenderness. There is no guarding.   Musculoskeletal:         General: Normal range of motion.      Cervical back: Normal range of motion and neck supple.      Right lower leg: No edema.      Left lower leg: No edema.   Neurological:      Mental Status: He is alert.       Sanya Freeman MD    "

## 2024-01-16 NOTE — TELEPHONE ENCOUNTER
Discussed scheduling PAD rehab with wife. Pt will plan to discuss with physician on 1/24 and call back to schedule PAD rehab. Concerns about driving far distance and weather addressed. Options would include PAD rehab at [place], PAD rehab at [facility], or [facility] program. Pt and wife will review at appointment and call back at [phone] in scheduling at .

## 2024-01-18 ENCOUNTER — TELEPHONE (OUTPATIENT)
Dept: FAMILY MEDICINE CLINIC | Facility: CLINIC | Age: 79
End: 2024-01-18

## 2024-01-18 DIAGNOSIS — E78.5 HYPERLIPIDEMIA, UNSPECIFIED HYPERLIPIDEMIA TYPE: ICD-10-CM

## 2024-01-18 DIAGNOSIS — E03.9 HYPOTHYROIDISM, UNSPECIFIED TYPE: Primary | ICD-10-CM

## 2024-01-18 DIAGNOSIS — R73.01 IMPAIRED FASTING GLUCOSE: ICD-10-CM

## 2024-01-18 DIAGNOSIS — I10 ESSENTIAL HYPERTENSION: ICD-10-CM

## 2024-01-18 NOTE — TELEPHONE ENCOUNTER
Voicemail: I was there yesterday to see Doctor Freeman. When I got home, I realized she didn't give me a blood work for next time, so that's why I'm requesting the blood work for my next visit. Champethel ANAYA Phone number is [phone]. Thank you.    - Please advise, no lab work ordered after visit.

## 2024-01-24 ENCOUNTER — OFFICE VISIT (OUTPATIENT)
Dept: VASCULAR SURGERY | Facility: CLINIC | Age: 79
End: 2024-01-24
Payer: COMMERCIAL

## 2024-01-24 VITALS
HEART RATE: 70 BPM | HEIGHT: 68 IN | DIASTOLIC BLOOD PRESSURE: 70 MMHG | OXYGEN SATURATION: 97 % | SYSTOLIC BLOOD PRESSURE: 136 MMHG | WEIGHT: 165 LBS | BODY MASS INDEX: 25.01 KG/M2

## 2024-01-24 DIAGNOSIS — Z78.9 ALCOHOL USE: ICD-10-CM

## 2024-01-24 DIAGNOSIS — I73.9 PAD (PERIPHERAL ARTERY DISEASE) (HCC): Primary | ICD-10-CM

## 2024-01-24 PROCEDURE — 99214 OFFICE O/P EST MOD 30 MIN: CPT | Performed by: SURGERY

## 2024-01-24 NOTE — PATIENT INSTRUCTIONS
Link to [facility] Mychart:  [email or web address]    1) PAD  -we will continue surveillance of your arteries on a yearly basis with ultrasound  -I do not think this is causing your thigh pain  -talk to your PCP about other causes, specifically spinal stenosis  -also talk to your PCP about general physical therapy for strengthening    2) Medications  -continue your atorvastatin  -STOP your cilastazol  -start aspirin 81mg once daily

## 2024-01-24 NOTE — PROGRESS NOTES
"Assessment/Plan:    Pt is a 77yo M w/ hypothryoidism, HTN, AS, OA, BPH, anxiety, ED, HLD, daily EtOH use, PAD, B thigh pain    PAD (peripheral artery disease) (HCC)   -reviewed AOIL which shows R distal EIA stenosis  -reviewed LEADs which shows B CFA stenosis R>L, L SFA moderate stenosis and R: 0.64/121/70 and L: 1.26/192/121  -although his R leg stenoses could be consistent with his thigh pain, this does not explain his severe L side thigh pain in the setting of normal SAUMYA, palpable fem/pop/PT pulses on the L; also would not expect thigh pain with merely changing positions even on the RLE; thus, I do not believe his symptoms are 2/2 his PAD  -discussed findings; I do not recommend any intervention at this time; other possible etiologies are are spinal stenosis or MSK  -will repeat LEADs for surveillance  -f/u 1 year    Alcohol use  1ga/wk which I believe is about 500cc/day  -discussed affect of EtOH on sleep cycle and potential cause of daytime sleepiness; also instructed to wean slowly and not try \"cold turkey\" quitting    Medications  -will stop pletal as this has not given him any relief of symptoms  -will start aspirin 81mg once daily  -will continue statin    Subjective:      Patient ID: Champ Nicole is a 78 y.o. male.    Patient presents to review AOIL and Arteriol Duplex done 01/05/2024. Patient c/o short distance claudication in GIBRAN hip/thigh. Patient experiencing weakness GIBRAN LE. Patient denies numbness, tingling and tissue loss. Patient is taking Atorvastatin and Pletal. Patient is a former smoker.     HPI:    Patient returns to review testing and discuss PAD.    Patient complains of pain in his thighs with ambulation.  Used to walk 1/4mile to the mailbox but can't so this any more. Last time he walked to the mailbox was 2-3 years ago.  He has 6 steps into his house and can do this holding on.  Does not get thigh pain with the stairs.  Does not get thigh pain in the grocery store when holding a cart.  " "This pain occurs when going from sitting to standing and lying down to standing.  He was able to get into the office without stopping.  His symptoms are equal in the bilateral thighs.  He gets the pain after <1block.    Takes statin and pletal.  Reports hematurria with ASA    Quit smoking in the '80s.    Drinks 1 gallon of wine/wk.    The following portions of the patient's history were reviewed and updated as appropriate: allergies, current medications, past family history, past medical history, past social history, past surgical history, and problem list.    Review of Systems   Constitutional: Negative.    HENT: Negative.     Eyes: Negative.    Respiratory: Negative.  Negative for shortness of breath.    Cardiovascular: Negative.  Negative for chest pain and leg swelling.   Gastrointestinal: Negative.    Endocrine: Negative.    Genitourinary: Negative.    Musculoskeletal:  Positive for gait problem and myalgias.   Skin:  Positive for color change. Negative for wound.   Allergic/Immunologic: Negative.    Hematological: Negative.    Psychiatric/Behavioral: Negative.           Objective:      /70 (BP Location: Right arm, Patient Position: Sitting, Cuff Size: Standard)   Pulse 70   Ht 5' 8\" (1.727 m)   Wt 74.8 kg (165 lb)   SpO2 97%   BMI 25.09 kg/m²          Physical Exam  Cardiovascular:      Rate and Rhythm: Normal rate and regular rhythm.      Pulses:           Radial pulses are 2+ on the right side and 2+ on the left side.        Femoral pulses are 2+ on the right side and 2+ on the left side.       Popliteal pulses are 0 on the right side and 2+ on the left side.        Dorsalis pedis pulses are 0 on the right side and 0 on the left side.        Posterior tibial pulses are 0 on the right side and 2+ on the left side.      Heart sounds: Murmur heard.   Pulmonary:      Effort: No respiratory distress.      Breath sounds: No wheezing or rales.   Musculoskeletal:      Right lower leg: No edema.      Left " "lower leg: No edema.   Skin:     Comments: No wounds B feet  Scattered spider veins B feet  1 large varicosity cluster at the L medial calf         I have reviewed and made appropriate changes to the review of systems input by the medical assistant.    Vitals:    01/24/24 1354   BP: 136/70   BP Location: Right arm   Patient Position: Sitting   Cuff Size: Standard   Pulse: 70   SpO2: 97%   Weight: 74.8 kg (165 lb)   Height: 5' 8\" (1.727 m)       Patient Active Problem List   Diagnosis   • Anxiety   • Benign prostatic hyperplasia   • Cataract   • Erectile dysfunction of non-organic origin   • Hearing loss   • Hyperlipidemia   • Hypothyroidism   • Impaired fasting glucose   • Macrocytosis   • Memory loss   • Osteoarthritis   • Primary localized osteoarthrosis of the hip   • PROVISIONAL Obsessive compulsive personality disorder   • BMI 25.0-25.9,adult   • Chronic intractable headache   • Mild dementia (HCC)   • Essential hypertension   • Alcohol use   • Osteochondroma of femur   • Primary localized osteoarthritis of pelvic region and thigh   • Insomnia   • Fall   • Aortic stenosis, moderate   • Troponin I above reference range   • Ambulatory dysfunction   • PAOLA (acute kidney injury) (HCC)   • PAD (peripheral artery disease) (HCC)       Past Surgical History:   Procedure Laterality Date   • CATARACT EXTRACTION Left     Dr. Macedo; onset: 8/6/13   • COLONOSCOPY      3/20/13, normal clon - Dr. Gan, repeat in 5-10 years   • HIP SURGERY     • [place] COLONOSCOPY FLX DX W/COLLJ SPEC WHEN PFRMD N/A 3/28/2018    Procedure: COLONOSCOPY;  Surgeon: CIARRA Gan MD;  Location: [facility];  Service: Colorectal   • THYROIDECTOMY, PARTIAL     • TONSILLECTOMY     • TOTAL HIP ARTHROPLASTY         Family History   Problem Relation Age of Onset   • Diabetes Father    • Lung cancer Father    • Alcohol abuse Paternal Uncle    • Heart disease Paternal Uncle        Social History     Socioeconomic History   • Marital status: " /Civil Union     Spouse name: Not on file   • Number of children: Not on file   • Years of education: completed some college   • Highest education level: Not on file   Occupational History   • Not on file   Tobacco Use   • Smoking status: Former   • Smokeless tobacco: Never   Vaping Use   • Vaping status: Never Used   Substance and Sexual Activity   • Alcohol use: Yes     Alcohol/week: 2.0 standard drinks of alcohol     Types: 2 Glasses of wine per week     Comment: 12 oz every night   • Drug use: No   • Sexual activity: Not on file   Other Topics Concern   • Not on file   Social History Narrative    Caffeine use    Marital relationship problem     Social Determinants of Health     Financial Resource Strain: Not on file   Food Insecurity: No Food Insecurity (6/11/2023)    Hunger Vital Sign    • Worried About Running Out of Food in the Last Year: Never true    • Ran Out of Food in the Last Year: Never true   Transportation Needs: No Transportation Needs (6/11/2023)    PRAPARE - Transportation    • Lack of Transportation (Medical): No    • Lack of Transportation (Non-Medical): No   Physical Activity: Not on file   Stress: Not on file   Social Connections: Not on file   Intimate Partner Violence: Not on file   Housing Stability: Low Risk  (6/11/2023)    Housing Stability Vital Sign    • Unable to Pay for Housing in the Last Year: No    • Number of Places Lived in the Last Year: 1    • Unstable Housing in the Last Year: No       Allergies   Allergen Reactions   • Aspirin      Other reaction(s): Blood Disorders, rectal bleeding/friable prostate  Category: Adverse Reaction;          Current Outpatient Medications:   •  Acetaminophen (TYLENOL PO), Take by mouth as needed, Disp: , Rfl:   •  amLODIPine (NORVASC) 10 mg tablet, Take 1 tablet (10 mg total) by mouth daily, Disp: 90 tablet, Rfl: 1  •  atorvastatin (LIPITOR) 80 mg tablet, Take 1 tablet (80 mg total) by mouth daily, Disp: 30 tablet, Rfl: 3  •   butalbital-acetaminophen-caffeine (FIORICET,ESGIC) -40 mg per tablet, Take 1 tablet by mouth daily as needed for headaches, Disp: 90 tablet, Rfl: 0  •  Dutasteride-Tamsulosin HCl 0.5-0.4 MG CAPS, Take 1 capsule by mouth daily, Disp: , Rfl:   •  levothyroxine 75 mcg tablet, Take 1 tablet (75 mcg total) by mouth daily, Disp: 90 tablet, Rfl: 3  •  lisinopril-hydrochlorothiazide (PRINZIDE,ZESTORETIC) 20-25 MG per tablet, TAKE 1 TABLET DAILY, Disp: 90 tablet, Rfl: 3  •  niacin 500 mg tablet, Take 500 mg by mouth daily with breakfast, Disp: , Rfl:   •  sertraline (ZOLOFT) 50 mg tablet, Take 1 tablet (50 mg total) by mouth daily, Disp: 90 tablet, Rfl: 1  •  amLODIPine (NORVASC) 10 mg tablet, Take 1 tablet (10 mg total) by mouth daily, Disp: 30 tablet, Rfl: 0

## 2024-02-06 DIAGNOSIS — Z78.9 ALCOHOL USE: Primary | ICD-10-CM

## 2024-02-06 DIAGNOSIS — R26.2 AMBULATORY DYSFUNCTION: Primary | ICD-10-CM

## 2024-02-06 NOTE — PROGRESS NOTES
Wife is here for her appt. Wife states pt still drinks a lot of alcohol everyday. Interested in program helping him quit alcohol.

## 2024-02-08 ENCOUNTER — PATIENT OUTREACH (OUTPATIENT)
Dept: FAMILY MEDICINE CLINIC | Facility: CLINIC | Age: 79
End: 2024-02-08

## 2024-02-08 NOTE — PROGRESS NOTES
Referral received from PCP for Outpatient Social Work Care Manager (OP SWCM) to outreach patient and assist with referral to detox program related to alcohol use.  Per chart review, patient also has hearing loss and mild dementia.    Call placed to patient to further discuss.  No answer, voicemail left, and awaiting return call.

## 2024-02-15 ENCOUNTER — PATIENT OUTREACH (OUTPATIENT)
Dept: FAMILY MEDICINE CLINIC | Facility: CLINIC | Age: 79
End: 2024-02-15

## 2024-02-15 NOTE — PROGRESS NOTES
2nd outreach attempt to patient to assist with referral to detox program related to alcohol use.  Per chart review, patient also has hearing loss and mild dementia.    Call placed to patient to further discuss.  No answer, voicemail left, and awaiting return call.    Will send Unable to Reach letter to patient via mail and close case at this time.

## 2024-02-15 NOTE — LETTER
[address] [id number]    Re: Unable to Reach   2/15/2024       Dear Champ,    I am a [facility]  and wanted to be certain you had information to contact me should you desire assistance with or have questions about non-medical aspects of your care such as [but not limited to] medical insurance, housing, transportation, material needs, or emergency needs, as I was unable to reach you.  If I do not have an answer I will assist you in finding the appropriate agency or individual who can help.      Please feel free to contact me at [phone]. Thank You.    Sincerely,         Ute Wiggins

## 2024-02-20 ENCOUNTER — PATIENT OUTREACH (OUTPATIENT)
Dept: FAMILY MEDICINE CLINIC | Facility: CLINIC | Age: 79
End: 2024-02-20

## 2024-02-20 DIAGNOSIS — G47.20 SLEEP PATTERN DISTURBANCE: Primary | ICD-10-CM

## 2024-02-20 NOTE — PROGRESS NOTES
Message received from patient's wife stating patient will not return OP SWCM's call but she would like a return call to discuss patient's needs.    Return call placed to patient's wife Gina (on consent form).  Gina is concerned as patient drinks 12 oz of wine each night before bed.  She states this information was received to PCP and Vascular providers who suggested patient decrease alcohol consumption as it may be disrupting his REM cycle.  Gina states patient will wake up during the night moaning/groaning, states things/people are bothering him, he curses, and the other night he got out of bed looking for his glasses that were on his nightstand & fell; Gina redirected patient to bed, laid with him, and he did not want her help to settle.  Gina unsure if patient is experiencing hallucinations.  Confirmed he has short-term memory loss but states long-term memory is great.    Spoke with patient regarding above.  Patient declined having problem with drinking and does not feel a need to decrease alcohol consumption as he feels this helps him sleep better.  Noted above behaviors; patient does not recall those behaviors happening at night.    Will route to PCP for further follow-up and to address sleeping patterns.

## 2024-03-01 DIAGNOSIS — R51.9 CHRONIC INTRACTABLE HEADACHE, UNSPECIFIED HEADACHE TYPE: ICD-10-CM

## 2024-03-01 DIAGNOSIS — G89.29 CHRONIC INTRACTABLE HEADACHE, UNSPECIFIED HEADACHE TYPE: ICD-10-CM

## 2024-03-01 RX ORDER — BUTALBITAL, ACETAMINOPHEN AND CAFFEINE 50; 325; 40 MG/1; MG/1; MG/1
1 TABLET ORAL DAILY PRN
Qty: 90 TABLET | Refills: 0 | Status: SHIPPED | OUTPATIENT
Start: 2024-03-01

## 2024-03-14 ENCOUNTER — PATIENT OUTREACH (OUTPATIENT)
Dept: FAMILY MEDICINE CLINIC | Facility: CLINIC | Age: 79
End: 2024-03-14

## 2024-03-14 NOTE — PROGRESS NOTES
Call placed to patient's wife Gina to check status of patient and patient's sleeping patterns; recommended Gina discuss with PCP regarding these concerns.    No answer, voicemail left, and awaiting return call.

## 2024-03-21 ENCOUNTER — PATIENT OUTREACH (OUTPATIENT)
Dept: FAMILY MEDICINE CLINIC | Facility: CLINIC | Age: 79
End: 2024-03-21

## 2024-03-21 ENCOUNTER — RA CDI HCC (OUTPATIENT)
Dept: OTHER | Facility: HOSPITAL | Age: 79
End: 2024-03-21

## 2024-03-21 NOTE — PROGRESS NOTES
HCC coding opportunities          Chart Reviewed number of suggestions sent to Provider: 1     Patients Insurance     Medicare Insurance: [employer]

## 2024-03-21 NOTE — PROGRESS NOTES
Additional call placed to patient's wife Gina to check status of patient and patient's sleeping patterns; recommended Gina discuss with PCP regarding these concerns.    Spoke with patient's wife Gina who confirmed patient has appt with Dr. Freeman on 3/27 to discuss patient's sleeping patterns.  She states her son will attend appt as well and they are planning to come with a list of questions to address with PCP.  Patient's wife also states she dumped all wine in the home and took keys from patient.  States he was still waking during the night, moaning, cursing, and pounding his hand on the bed.  She gave patient 2 tylenol and melatonin last night; patient slept through the night.  She also plans to ask PCP if another brain scan can be completed to determine if his cognition is further declining.  She would also like patient to participate in OP PT.  Will route to PCP.    No other needs known at this time.  Will remain available to assist as needed.

## 2024-03-27 ENCOUNTER — OFFICE VISIT (OUTPATIENT)
Dept: FAMILY MEDICINE CLINIC | Facility: CLINIC | Age: 79
End: 2024-03-27
Payer: MEDICARE

## 2024-03-27 ENCOUNTER — PATIENT OUTREACH (OUTPATIENT)
Dept: FAMILY MEDICINE CLINIC | Facility: CLINIC | Age: 79
End: 2024-03-27

## 2024-03-27 ENCOUNTER — TELEPHONE (OUTPATIENT)
Dept: FAMILY MEDICINE CLINIC | Facility: CLINIC | Age: 79
End: 2024-03-27

## 2024-03-27 VITALS
WEIGHT: 166 LBS | RESPIRATION RATE: 16 BRPM | HEIGHT: 68 IN | DIASTOLIC BLOOD PRESSURE: 68 MMHG | TEMPERATURE: 96.4 F | SYSTOLIC BLOOD PRESSURE: 120 MMHG | OXYGEN SATURATION: 98 % | BODY MASS INDEX: 25.16 KG/M2 | HEART RATE: 66 BPM

## 2024-03-27 DIAGNOSIS — I73.9 PAD (PERIPHERAL ARTERY DISEASE) (HCC): ICD-10-CM

## 2024-03-27 DIAGNOSIS — Z78.9 ALCOHOL USE: ICD-10-CM

## 2024-03-27 DIAGNOSIS — F03.A4 MILD DEMENTIA WITH ANXIETY, UNSPECIFIED DEMENTIA TYPE (HCC): ICD-10-CM

## 2024-03-27 DIAGNOSIS — R19.5 LOOSE STOOLS: ICD-10-CM

## 2024-03-27 DIAGNOSIS — G47.00 INSOMNIA, UNSPECIFIED TYPE: Primary | ICD-10-CM

## 2024-03-27 PROBLEM — E11.51: Status: ACTIVE | Noted: 2024-03-27

## 2024-03-27 PROCEDURE — 99214 OFFICE O/P EST MOD 30 MIN: CPT | Performed by: FAMILY MEDICINE

## 2024-03-27 PROCEDURE — G2211 COMPLEX E/M VISIT ADD ON: HCPCS | Performed by: FAMILY MEDICINE

## 2024-03-27 RX ORDER — ATORVASTATIN CALCIUM 80 MG/1
80 TABLET, FILM COATED ORAL DAILY
Qty: 30 TABLET | Refills: 3 | Status: CANCELLED | OUTPATIENT
Start: 2024-03-27

## 2024-03-27 RX ORDER — CLOPIDOGREL BISULFATE 75 MG/1
TABLET ORAL
COMMUNITY
End: 2024-03-27 | Stop reason: ALTCHOICE

## 2024-03-27 RX ORDER — ASPIRIN 81 MG/1
81 TABLET, CHEWABLE ORAL DAILY
COMMUNITY

## 2024-03-27 RX ORDER — ATORVASTATIN CALCIUM 80 MG/1
80 TABLET, FILM COATED ORAL DAILY
Qty: 30 TABLET | Refills: 0 | Status: SHIPPED | OUTPATIENT
Start: 2024-03-27

## 2024-03-27 RX ORDER — TRAZODONE HYDROCHLORIDE 50 MG/1
50 TABLET ORAL
Qty: 30 TABLET | Refills: 2 | Status: SHIPPED | OUTPATIENT
Start: 2024-03-27

## 2024-03-27 RX ORDER — CILOSTAZOL 100 MG/1
TABLET ORAL
COMMUNITY
End: 2024-03-27 | Stop reason: ALTCHOICE

## 2024-03-27 NOTE — TELEPHONE ENCOUNTER
Voicemail: Hello, I just got a a call from [employer] that apparently somehow he mixed up some of his pills. And anyway, they suggested that I call Doctor Pete and that she called in for the Lipitor, the generic name? 80 milligrams, 10 tablets. Just until that [facility] can get down the pills for him. He called in with the but there should have been more left for in here this bottle salt. Now I have to take care of all his pills. I will get pill boxes and stuff and fill them for him. But if you could send a script to [facility] on [facility], he'll come and get them tomorrow. Thank you very much. Any questions, call [phone]. Thank you.

## 2024-03-27 NOTE — ASSESSMENT & PLAN NOTE
Educated pt about sleep hygiene. Give trazodone 50mg qhs. SE educated pt. Advised pt do not use with alcohol.

## 2024-03-27 NOTE — PROGRESS NOTES
Met Champ his wife Gina and two sons at his PCP appointment One son currently is living with them but will probably move out by end of year. The other son lives in [place].  They are looking for resources for food shopping, lawn care, snow removal and cleaning. Explained I will discuss with Ute  and we will email them information   [email or web address] is the email we should use.   They live in [facility] Currently use a pillbox but sons feel pill packing would be helpful [facility] medications   Informed patient and family myself or Ute  will call them back

## 2024-03-27 NOTE — ASSESSMENT & PLAN NOTE
[facility] neurology. Care coordinator Anna Marie talked with sons about resources for independent living.

## 2024-03-27 NOTE — PROGRESS NOTES
Name: Champ Nicole      : 1945      MRN: [id number]  Encounter Provider: Sanya Freeman MD  Encounter Date: 3/27/2024   Encounter department: [facility]  Chief Complaint   Patient presents with    Insomnia     insomnia, memory loss, unable to sleep heavy from drinking wine before bed     Health Maintenance   Topic Date Due    Diabetic Foot Exam  Never done    Zoster Vaccine (2 of 3) 2013    DM Eye Exam  2021    COVID-19 Vaccine ( season) 2024    Falls: Plan of Care  2024    Fall Risk  10/13/2024    Depression Screening  10/13/2024    Medicare Annual Wellness Visit (AWV)  10/13/2024    HEMOGLOBIN A1C  2025    Pneumococcal Vaccine: 65+ Years  Completed    Influenza Vaccine  Completed    HIB Vaccine  Aged Out    IPV Vaccine  Aged Out    Hepatitis A Vaccine  Aged Out    Meningococcal ACWY Vaccine  Aged Out    HPV Vaccine  Aged Out    Colorectal Cancer Screening  Discontinued     Assessment & Plan     1. Insomnia, unspecified type  Assessment & Plan:  Educated pt about sleep hygiene. Give trazodone 50mg qhs. SE educated pt. Advised pt do not use with alcohol.     Orders:  -     traZODone (DESYREL) 50 mg tablet; Take 1 tablet (50 mg total) by mouth daily at bedtime    2. Alcohol use  Assessment & Plan:  Wife states pt did not drink alcohol for 1 month. Educated pt and wife about SHARE program. Call if need help.       3. Mild dementia with anxiety, unspecified dementia type (HCC)  Assessment & Plan:  [facility] neurology. Care coordinator Anna Marie talked with sons about resources for independent living.       4. Loose stools  Assessment & Plan:  Once per day. Limit coffee, alcohol, artificial sweeteners etc. Refer to colon surgeon.     Orders:  -     Ambulatory Referral to Colorectal Surgery; Future           Subjective      HPI    Pt is here with his wife Gina and 2 sons.     Gina states patient drinks 12 oz of wine or more each night before bed for years. Gina  states patient will wake up during the night moaning/groaning, states things/people are bothering him, he curses. Pt states he does not remember those behaviors happening at night. Patient states alcohol helps him sleep better.   Gina states pt did not drink for 1 month. Pt states he did not sleep for 1 month, would like medication to help him sleep. Pt states he tried melatonin but no help.   Pt was on trazodone and alcohol before, then had syncope at that time.   Gina and pt would like to restart trazodone since he does not drink alcohol now.     Refused SHARE program now.    Mild dementia--- neurologist. He is off Aricept because side effects/dementia worse per wife. Wife asked about brain scan. I advised wife to talk to neurologist.   One son lives with them now. They would like to live independently as long as possible. Our care coordinator Anna Marie gave them resources/information.     Loose stool for 1 year. Once per day, not watery.   Pt states he can control BM but wife states he has stains on underwear/pants.    He drinks coffee 1-2 cups per day with milk.   He does not drink soda.   Denies n/v/abd pain.   Colonoscopy 2019.         Review of Systems   Constitutional:  Negative for appetite change, chills and fever.   HENT:  Negative for congestion, ear pain, sinus pain and sore throat.    Eyes:  Negative for discharge and itching.   Respiratory:  Negative for apnea, cough, chest tightness, shortness of breath and wheezing.    Cardiovascular:  Negative for chest pain, palpitations and leg swelling.   Gastrointestinal:  Negative for abdominal pain, anal bleeding, constipation, diarrhea, nausea and vomiting.   Endocrine: Negative for cold intolerance, heat intolerance and polyuria.   Genitourinary:  Negative for difficulty urinating and dysuria.   Musculoskeletal:  Negative for arthralgias, back pain and myalgias.   Skin:  Negative for rash.   Neurological:  Negative for dizziness and headaches.  "  Psychiatric/Behavioral:  Positive for sleep disturbance. Negative for agitation.        Current Outpatient Medications on File Prior to Visit   Medication Sig    Acetaminophen (TYLENOL PO) Take by mouth as needed    amLODIPine (NORVASC) 10 mg tablet Take 1 tablet (10 mg total) by mouth daily    aspirin 81 mg chewable tablet Chew 81 mg daily    atorvastatin (LIPITOR) 80 mg tablet Take 1 tablet (80 mg total) by mouth daily    butalbital-acetaminophen-caffeine (FIORICET,ESGIC) -40 mg per tablet Take 1 tablet by mouth daily as needed for headaches    Dutasteride-Tamsulosin HCl 0.5-0.4 MG CAPS Take 1 capsule by mouth daily    levothyroxine 75 mcg tablet Take 1 tablet (75 mcg total) by mouth daily    lisinopril-hydrochlorothiazide (PRINZIDE,ZESTORETIC) 20-25 MG per tablet TAKE 1 TABLET DAILY    niacin 500 mg tablet Take 500 mg by mouth daily with breakfast    sertraline (ZOLOFT) 50 mg tablet Take 1 tablet (50 mg total) by mouth daily    [DISCONTINUED] amLODIPine (NORVASC) 10 mg tablet Take 1 tablet (10 mg total) by mouth daily    [DISCONTINUED] cilostazol (PLETAL) 100 mg tablet     [DISCONTINUED] clopidogrel (PLAVIX) 75 mg tablet        Objective     /68   Pulse 66   Temp (!) 96.4 °F (35.8 °C) (Tympanic)   Resp 16   Ht 5' 8\" (1.727 m)   Wt 75.3 kg (166 lb)   SpO2 98%   BMI 25.24 kg/m²     Physical Exam  Constitutional:       Appearance: He is well-developed.   HENT:      Head: Normocephalic and atraumatic.   Eyes:      General:         Right eye: No discharge.         Left eye: No discharge.      Conjunctiva/sclera: Conjunctivae normal.   Cardiovascular:      Rate and Rhythm: Normal rate and regular rhythm.      Heart sounds: Normal heart sounds. No murmur heard.     No friction rub. No gallop.   Pulmonary:      Effort: Pulmonary effort is normal. No respiratory distress.      Breath sounds: Normal breath sounds. No wheezing or rales.   Abdominal:      General: Bowel sounds are normal. There is no " distension.      Palpations: Abdomen is soft.      Tenderness: There is no abdominal tenderness. There is no guarding.   Musculoskeletal:         General: Normal range of motion.      Cervical back: Normal range of motion and neck supple.      Right lower leg: No edema.      Left lower leg: No edema.   Neurological:      Mental Status: He is alert.       Sanya Freeman MD

## 2024-03-27 NOTE — ASSESSMENT & PLAN NOTE
Wife states pt did not drink alcohol for 1 month. Educated pt and wife about SHARE program. Call if need help.

## 2024-04-03 ENCOUNTER — PATIENT OUTREACH (OUTPATIENT)
Dept: FAMILY MEDICINE CLINIC | Facility: CLINIC | Age: 79
End: 2024-04-03

## 2024-04-03 NOTE — PROGRESS NOTES
Spoke with patients son Nikolai to let him know [facility] not have pil packing capability.   He can check with parents insurance and see if there would be a penalty in changing pharmacies if there is not one I could look for a pharmacy near his parents that does pill packing and we could switch them over. He was appreciative of the information.  Reminded him Ute   will still reach out.

## 2024-04-04 ENCOUNTER — PATIENT OUTREACH (OUTPATIENT)
Dept: FAMILY MEDICINE CLINIC | Facility: CLINIC | Age: 79
End: 2024-04-04

## 2024-04-04 NOTE — PROGRESS NOTES
Update received from OP RNCM Anna Marie Mesa who met with patient and patient's family after recent PCP appt.  Patient's sons requesting information on food shopping resources, lawn care services, snow removal services, and cleaning services.    Call placed to patient's son Jenelle (on consent) to further discuss.  No answer, voicemail left, and awaiting return call.

## 2024-04-08 ENCOUNTER — PATIENT OUTREACH (OUTPATIENT)
Dept: FAMILY MEDICINE CLINIC | Facility: CLINIC | Age: 79
End: 2024-04-08

## 2024-04-08 NOTE — PROGRESS NOTES
Return call received from patient's son Jenelle (on consent).  Jenelle lives with patient and patient's wife right now.  Jenelle confirmed patient and patient's wife would benefit from food resources, lawn care/snow removal services, cleaning services, and possible transportation to/from appts.  Jenelle states patient is still driving but would benefit from transportation services.  They are also needing assistance with managing medications.  Jenelle stated he, his brother, and his sister find multiple supplements and vitamins in patient's home and patient/patient's wife could not specify when/how they take medications.  OP RNCM Anna Marie met with patient and patient's wife at recent PCP appt.  [facility] is unable to provide pill packing.  Patient's son Farrukh aware of this; OP RNCM suggested calling patient's insurance to inquire about coverage for pill packing through alternate pharmacy.  Jenelle will talk with Farrukh about this.  Jenelle states he and his siblings plan to speak with patient and patient's wife this coming weekend.  He requested resources/information be emailed to him to review with his siblings.  OP SWCM will follow-up at later date to further discuss.    Below resources emailed to Jenelle to review with his siblings:    Food:  Meals on Wheels - Home - Meals On Wheels ([email or web address])  Mom's Meals - Home  Mom's Meals ([employer])  Lasagna Love - Lasagna Love  Lasagna Love  Full Cart - Full Cart - Menu    Lawn Care/Snow Removal Services:  Share Care [employer] - [email or web address]/ShareCare-brochure.pdf  Seniors Helping Seniors - Home - Seniors Helping Seniors  [facility] Aging in Place - [email or web address]/wp-content/uploads/2023/12/Member-Guide-2024-final-edits-V2.pdf  [facility] Senior Resource Guide - attached document in email    Cleaning Services:  Attached list of cleaning services in the [facility] Cleaning Services  Daina Macdonald Services    Transportation:  [place] -  Applications - Migel Bus  Ty - Transportation and More From a Phone Call  Ty  LYFT/UBER - self-pay through angelica or some [facility]'s Offices contract with [employer] and can arrange LYFT rides if patient/family notify the office in advance  iTN - [place]  Transportation & Errands - Transportation and Errands    Pill Packing:  Reminded patient's sons to call patient's insurance to inquire about coverage for pill packing and if there is a penalty to change pharmacy to one that can offer pill packing/delivery of medications.    Will follow.

## 2024-04-10 DIAGNOSIS — I73.9 PAD (PERIPHERAL ARTERY DISEASE) (HCC): ICD-10-CM

## 2024-04-10 DIAGNOSIS — G47.00 INSOMNIA, UNSPECIFIED TYPE: ICD-10-CM

## 2024-04-11 RX ORDER — TRAZODONE HYDROCHLORIDE 50 MG/1
50 TABLET ORAL
Qty: 30 TABLET | Refills: 2 | Status: SHIPPED | OUTPATIENT
Start: 2024-04-11

## 2024-04-11 RX ORDER — ATORVASTATIN CALCIUM 80 MG/1
80 TABLET, FILM COATED ORAL DAILY
Qty: 90 TABLET | Refills: 1 | Status: SHIPPED | OUTPATIENT
Start: 2024-04-11

## 2024-04-15 ENCOUNTER — PATIENT OUTREACH (OUTPATIENT)
Dept: FAMILY MEDICINE CLINIC | Facility: CLINIC | Age: 79
End: 2024-04-15

## 2024-04-15 NOTE — PROGRESS NOTES
Voicemail left for patient's son Jenelle [phone] in attempt to check status of patient and follow-up on resources previously sent to Jenelle to review.  Awaiting return call.

## 2024-04-22 ENCOUNTER — PATIENT OUTREACH (OUTPATIENT)
Dept: FAMILY MEDICINE CLINIC | Facility: CLINIC | Age: 79
End: 2024-04-22

## 2024-04-22 NOTE — PROGRESS NOTES
Additional call placed to patient's son Jenelle [phone] in attempt to check status of patient and follow-up on resources listed below that were previously sent to review.    Food:  Meals on Wheels - Home - Meals On Wheels ([email or web address].[email or web address])  Mom's Meals - Home  Mom's Meals ([employer])  Lasagna Love - Lasagna Love  Lasagna Love  Full Cart - Full Cart - Menu     Lawn Care/Snow Removal Services:  [employer] - sharecarefaithinaction.[email or web address]/pdf/ShareCare-brochure.pdf  Seniors Helping Seniors - Home - Seniors Helping Seniors  [facility] Aging in Place - [email or web address]-content/uploads/2023/12/Member-Guide-2024-final-edits-V2.pdf  [facility] Senior Resource Guide - attached document in email     Cleaning Services:  Attached list of cleaning services in the [facility] Cleaning Services  [employer]ain[employer] Macdonald Services     Transportation:  MIGEL - Applications - Migel Bus  [facility] - Transportation and More From a Phone Call  [facility]  LYFT/UBER - self-pay through angelica or some [facility]'s Offices contract with LYFT and can arrange LYFT rides if patient/family notify the office in advance  iTN - [facility]  Transportation & Errands - Transportation and Errands     Pill Packing:  Reminded patient's sons to call patient's insurance to inquire about coverage for pill packing and if there is a penalty to change pharmacy to one that can offer pill packing/delivery of medications.    No answer, voicemail left, and awaiting return call.  Follow-up email sent to patient's son as well.  If no response received, will close case.

## 2024-04-24 DIAGNOSIS — F41.9 ANXIETY: ICD-10-CM

## 2024-04-24 DIAGNOSIS — R51.9 CHRONIC INTRACTABLE HEADACHE, UNSPECIFIED HEADACHE TYPE: ICD-10-CM

## 2024-04-24 DIAGNOSIS — G89.29 CHRONIC INTRACTABLE HEADACHE, UNSPECIFIED HEADACHE TYPE: ICD-10-CM

## 2024-04-24 RX ORDER — BUTALBITAL, ACETAMINOPHEN AND CAFFEINE 50; 325; 40 MG/1; MG/1; MG/1
1 TABLET ORAL DAILY PRN
Qty: 90 TABLET | Refills: 1 | OUTPATIENT
Start: 2024-04-24

## 2024-04-24 NOTE — TELEPHONE ENCOUNTER
Reason for call:   [x] Refill   [] Prior Auth  [] Other:     Office:   [x] PCP/Provider - Pete  [] Specialty/Provider -     Medication:   butalbital-acetaminophen-caffeine (FIORICET,ESGIC) -40 mg per tablet    sertraline (ZOLOFT) 50 mg tablet       Dose/Frequency:   1 tablet, Oral, Daily PRN   50 mg, Oral, Daily     Quantity:   90  90    Pharmacy: [facility] - [place], [place] - [address] [place] [address]     Does the patient have enough for 3 days?   [x] Yes   [] No - Send as HP to POD

## 2024-04-25 ENCOUNTER — PATIENT OUTREACH (OUTPATIENT)
Dept: FAMILY MEDICINE CLINIC | Facility: CLINIC | Age: 79
End: 2024-04-25

## 2024-04-25 NOTE — PROGRESS NOTES
No response/return call received from patient's sons since recent outreach attempts to discuss community resources.    Will close case at this time but will remain available to assist with future needs pending return call.

## 2024-05-01 ENCOUNTER — PATIENT OUTREACH (OUTPATIENT)
Dept: FAMILY MEDICINE CLINIC | Facility: CLINIC | Age: 79
End: 2024-05-01

## 2024-05-01 NOTE — LETTER
Date: 05/01/24    Dear Champ Nicole,   My name is Anna Marie Bonita; I am a registered nurse care manager working with [address] [address].   I have not been able to reach you and would like to set a time that I can talk with you over the phone.  My work is to help patients that have complex medical conditions get the care they need. This includes patients who may have been in the hospital or emergency room.    Please call me with any questions you may have. I look forward to speaking with you.  Sincerely,  Anna Marie Bonita  [phone]  Outpatient Care Manager

## 2024-05-02 ENCOUNTER — TELEPHONE (OUTPATIENT)
Age: 79
End: 2024-05-02

## 2024-05-02 DIAGNOSIS — F10.10 ALCOHOL ABUSE: ICD-10-CM

## 2024-05-02 DIAGNOSIS — R41.3 MEMORY LOSS: Primary | ICD-10-CM

## 2024-05-02 DIAGNOSIS — Z78.9 ALCOHOL USE: ICD-10-CM

## 2024-05-02 DIAGNOSIS — F03.A4 MILD DEMENTIA WITH ANXIETY, UNSPECIFIED DEMENTIA TYPE (HCC): ICD-10-CM

## 2024-05-02 NOTE — TELEPHONE ENCOUNTER
Pt's son, Nikolai, called very concerned for parents, in particular his father, this pt.  States that his brother who currently lives with parents is moving out soon and worried for pt's safety and health.    States pt has not been sleeping at night and continues to over consume wine despite advice from PCP to stop.      Pt will take prescribed sleep aid and has also NOT stopped OTC sleep aids like melatonin as he was advised by PCP.  Pt will also at time take a second dose of prescribed sleep aid.    Son states that pt will think he will sleep at night but instead as per his sibling he will wander around the house at night which has led to many falls int he last few months.      Son also concerned that pt's lack of sleep is interfering with his cognitive health.  States his sibling has told him that his parents will have screaming arguments with each other and at time these arguments will become physical.      Son also expresses concern that pt will lie and manipulate and not be truthful to PCP regarding his cognition and other health conditions.  Pt's wife has expressed to son that she doesn't want to take care of pt or schedule or take him to his appts.    Son asking if PCP could provide some direction in terms of how best to proceed with care of pt.  Please review and advise how son should proceed.  Son asking for return call and states it is ok to leave a detailed message in case he does not  call.

## 2024-05-03 DIAGNOSIS — F10.10 ALCOHOL ABUSE: Primary | ICD-10-CM

## 2024-05-07 ENCOUNTER — TELEPHONE (OUTPATIENT)
Dept: PSYCHIATRY | Facility: CLINIC | Age: 79
End: 2024-05-07

## 2024-05-07 NOTE — TELEPHONE ENCOUNTER
2X attempted to contact pt regarding this referral.  No answer  LVM providing SHARE office phone number.   Referral closed at this time.

## 2024-05-12 ENCOUNTER — TELEPHONE (OUTPATIENT)
Dept: OTHER | Facility: OTHER | Age: 79
End: 2024-05-12

## 2024-05-12 NOTE — TELEPHONE ENCOUNTER
Patient is calling regarding cancelling an appointment.    Date/Time: 05/13/2024 1:40 PM     Patient was rescheduled: YES [] NO [x]    Patient requesting call back to reschedule: YES [] NO [x]

## 2024-05-20 ENCOUNTER — CONSULT (OUTPATIENT)
Age: 79
End: 2024-05-20
Payer: COMMERCIAL

## 2024-05-20 VITALS
WEIGHT: 162.8 LBS | DIASTOLIC BLOOD PRESSURE: 58 MMHG | HEIGHT: 70 IN | HEART RATE: 65 BPM | OXYGEN SATURATION: 98 % | SYSTOLIC BLOOD PRESSURE: 118 MMHG | TEMPERATURE: 97.3 F | BODY MASS INDEX: 23.31 KG/M2

## 2024-05-20 DIAGNOSIS — H91.90 HEARING LOSS, UNSPECIFIED HEARING LOSS TYPE, UNSPECIFIED LATERALITY: ICD-10-CM

## 2024-05-20 DIAGNOSIS — Z91.89 DRIVING SAFETY ISSUE: ICD-10-CM

## 2024-05-20 DIAGNOSIS — F03.A4 MILD DEMENTIA WITH ANXIETY, UNSPECIFIED DEMENTIA TYPE (HCC): Primary | ICD-10-CM

## 2024-05-20 DIAGNOSIS — R26.2 AMBULATORY DYSFUNCTION: ICD-10-CM

## 2024-05-20 DIAGNOSIS — G47.00 INSOMNIA, UNSPECIFIED TYPE: ICD-10-CM

## 2024-05-20 DIAGNOSIS — F41.9 ANXIETY: ICD-10-CM

## 2024-05-20 PROCEDURE — 99214 OFFICE O/P EST MOD 30 MIN: CPT | Performed by: NURSE PRACTITIONER

## 2024-05-20 RX ORDER — VITAMIN E 268 MG
400 CAPSULE ORAL DAILY
COMMUNITY

## 2024-05-20 RX ORDER — CEPHALEXIN 500 MG/1
2000 CAPSULE ORAL
COMMUNITY
Start: 2024-03-13

## 2024-05-20 RX ORDER — ASCORBIC ACID 500 MG
500 TABLET ORAL DAILY
COMMUNITY

## 2024-05-20 RX ORDER — MELATONIN: COMMUNITY

## 2024-05-20 NOTE — PROGRESS NOTES
Assessment & Plan:   1. Mild dementia with anxiety, unspecified dementia type (HCC)  Assessment & Plan:  Pt independent with adl, dependent for iadls.  Memory loss:  STM over time, confusion at night, more irritability  MOCA 21/30.  Deficits in all domains but naming, attention, abstraction  CTH, 6/2023:  No acute intracranial abnormality.  Mild progressive ventriculomegaly without evidence of obstructive hydrocephalus. Findings may be secondary to progressive central volume loss. Findings are equivocal for normal pressure hydrocephalus.  [facility]: 6/2023: Folate> 22.3, B12 941.  1/2024: TSH 2.43  History, physical, and cognitive screening are most consistent with:  mild dementia as previously diagnosed  Contributing factors:  mood  Further eval warrants the following:  none  Memory meds:  trial'd aricept, did not tolerate  Cont supportive cares lives at home, family involved  Caregiver stress concerns:  future planning  SW needs this visit:  see intake note  Discussed safe environment  Wandering:  No concerns at this time.   Home:  No concerns at this time.    Driving safety:  family has some concerns, will check FTD  Fall preventions:  fall history, fall precautions  Medication management:  wife takes care of, historically unsure of meds- would benefit from prepacked meds or care home type assistance  Scam safety:  Do not answer suspicious mail, phone calls, email, or text message without having second person review d/t safety risk.  Do not give out personal info to questionable sources- read company safety policies for how they might contact you.  Continue to engage in physical, cognitive, social activity.  Cont doing games, puzzles, trivia, current event discussions  Cont daily walks or exercise video  Cont outings with friends and family.  Avoid social isolation.  Referral to cognitive therapy:  pt declined  Optimize chronic and acute conditions  Cont to f/u with providers and ensure medication compliance  Vascular  "risk factors:  htn, hld, PAD, prior etoh/smoking  Medication review:  seems appropriate for current conditions.  Monitor for inc SE d/t polypharmacy  Ensure good diet (ex Mediterranean diet) and adequate hydration  Monitor for changes in behavior/mood- if noted, notify provider for eval  Encourage mindfulness and positive socialization for general wellness.  Avoid medications that worsen cognition - check with provider or pharmacist before starting new or OTC meds  Do not overwhelm pt with info.    Do one task at a time. Use slower pace.  Keep to one conversation at a time.  Do not multitask.  Decision making:  At this point, recommend making \"bigger\" decisions with poa/care partner.  In future, if capacity is of concern, would refer to neuropsychology for full eval.  Encourage independence as able.  Recommended next follow up: 6 months for cont cog/fxn eval and family/pt support    Orders:  -     Ambulatory Referral to OT  Adaptability Evaluation; Future  2. Hearing loss, unspecified hearing loss type, unspecified laterality  Assessment & Plan:  Pt has mild hearing loss, no HAs   Per wife, he should wear HAs  As hearing loss can impact cognition, recommend f/u with audiology for regular hearing eval  Speak clearly and toward pt when communicating.  Dec outside noise distractions.  3. Anxiety  Assessment & Plan:  Ongoing anxiety issues for long time  Pt currently on sertraline 50mg qd and trazodone 50mg qhs per medication list provided  Cont emotional support, relaxation techniques  Consider BH for CBT as needed.  4. Insomnia, unspecified type  Assessment & Plan:  Long term sleep issues that pt has been struggling with  Previously medicated with Etoh- pt/wife stopped this  Now takes trazodone 50mg which wife reports helps nicely with sleep  Per family, pt has not had sleep study - would consider as this can contribute to cognitive fog  Keep active during the day, limit day sleep, encourage good sleep hygiene " techniques  Cont to avoid sleep supplements that contain diphenhydramine or benzodiazepines   5. Ambulatory dysfunction  Assessment & Plan:  Pt does not use AD, has fall history  Cont fall precautions  6. Driving safety issue  Assessment & Plan:  Pt feels driving is fine, wife/son are somewhat concerned, yanelis when out of common area.  Discussed with pt/son/wife:  pt will need to have FTD evaluated d/t their concerns.  He is to stay day driving in local area only until completed.   Pt needs to complete eval or will need to notify [employer]  If pt does poorly on FTD will need to notify [employer] of results  Pt/family agreeable.      HPI:  We had the pleasure of evaluating Champ Nicole who is a 79 y.o. male in Geriatric Consultation today.  Pt was previously seen by geriatrics while inpt last year.  He has also been followed by neurology.  Previous MOCA:  22/30.  Comorbidities include mild dementia, anxiety, insomnia.  . ([employer]).    Mr. Nicole is in the office with his wife and son, Nikolai.    Memory Concerns per family (d/t pt's memory troubles): Pt not taking it well that he is 79y.  He takes a lot of naps.  He plays games on computers, then he goes back to sleep.  Not much physcial, cognitive, social activity.  Slowing of though process, inc confusions.  Can remember long term, but nothing short term.  Had to remember twice what day of the week it was.  He refuses to wear HAs.  Wife has to repeat everything.  Increased aggrevation also.  It stems from confusion and hearing loss, gets lost in convo or in situation.  He can do self care ok.  He has more trouble at night.  He has a lot of trouble sleeping at night.  He is up at night yelling, tossing, sometimes wanders the house, making sure that everything is off.  Then he falls asleep late in the morning.  Trazodone is helping the sleep pattern.  He's not yelling at night too much.  [facility] is [facility]- he was against  it because he thought it was a nursing home.  He is not interested in social activity.  Son is still at home.  He's still not convinced that he needs to go.  He forgets a lot.  He need repeating a lot to understand things.      Per son:  they are at each other all the time.  Get into screaming matches.  Unsure how to handle.  Family Is looking into [employer] w/hope that she will have more social time and less house work time, he will have more time to rest as he likes.    Memory concerns per patient:  Self care independent.  Has some STM loss, ongoing for years, about the same.  LTM is good.  Both take care of medications.  She takes care of the bills (always).  Helps clean, doesn't cook.  No AD, about 2 months ago, tripped and fell, did not hurt anything.  No HAs.  Appetite is good.  No trouble swallow.  No BR troubles.  No troubles with sleep.  Reads newspaper and magazines.  Not interested in apps list= likes hearts.  Not too much exercising.   He's a homebody.  Questions about forgetfulness - occ names of people, not losing things, forgetting convo.  Doesn't repeat self more often.  No trouble with word finding.  Thinks he is just as quick as usual for problem solving.  Doesn't feel that memory is impacting day to day function.  No major anxiety - doing ok.      Function Concerns:    He lives with his wfie  ADL independent, iADL dependent, Medication management: wife manages (she hides meds because he does take more than prescribe)   Do you drive: Yes  - per son, he has gotten lost and needed to call home to get back, per wife, she drives unless pt is just running out for the paper.  Both feel he always had poor sense of direction.  Do you handle your own financial affairs such as balancing your checkbook, paying bills, investments: No  Have you noticed any gait or balance disorder:  Yes.  Uses no AD to assist with ambulation. Has had some falls over time.  Any urinary/stool incontinence:  no per pt    "Appetite/swallow: good/good    Hearing issues:yes (going down).  Vision issues: glasses      Psychosocial concerns:  Have you or your family noted any change in your mood or personality:Yes  Are you currently or have you been treated in the past for depression or anxiety: Yes  Any hallucination or delusion: No  Sleep Issues: Yes  Pain:  no chronic pain.      Past Medical, surgical, social, medication and allergy history and patients previous records reviewed.    He has  problems operating household appliance such as TV remote, kitchen appliances, computer.  He can use cell phone only to call his wife.    Patient requires repeat information or ask the same question repeatedly: Yes  He has difficulty finding the right word while speaking: Yes  Family Review of Behavior [facility]:    pacing.     agressive/combative behavior. No    agitated. Yes   wandering. No - wanders around the house  resistance to care. No   hoarding/hiding objects.No  - did hide wine, notihing else  suspicious  No  withdrawn Yes  rummaging/pillaging.No    misplacing/losing objects No  personal hygiene problems.No  forgetfulness of actions Yes  Whole events?  yes  Details?  yes  Can patient bring info back? Yes, sometimes    Family member with dementia and what type?yes - mom late 80s  Does patient have previous diagnosis of dementia?  yes   Does patient take any \"memory medications\"? Yes   Stroke history No  Have you had any head trauma Yes -in grade score  Does patient have history of alcohol abuse Yes - was drinking large glasses of wine and then drinking from the bottle.  He stopped drinking per wife (he never was a drinker- this started after memory loss).      ROS:  Review of Systems   Constitutional:  Negative for activity change, appetite change, chills and fatigue.   HENT:  Positive for hearing loss. Negative for congestion and trouble swallowing.    Eyes:  Negative for visual disturbance.   Respiratory:  Negative for cough and shortness of " breath.    Cardiovascular:  Negative for chest pain.   Gastrointestinal:  Negative for abdominal pain, constipation, diarrhea, nausea and vomiting.   Genitourinary:  Negative for difficulty urinating.   Musculoskeletal:  Positive for gait problem. Negative for arthralgias and back pain.   Neurological:  Negative for dizziness and light-headedness.   Psychiatric/Behavioral:  Positive for decreased concentration (forgetful). Negative for dysphoric mood and sleep disturbance.        Allergies:   No Known Allergies    Medications:      Current Outpatient Medications:     Acetaminophen (TYLENOL PO), Take by mouth as needed, Disp: , Rfl:     amLODIPine (NORVASC) 10 mg tablet, Take 1 tablet (10 mg total) by mouth daily, Disp: 90 tablet, Rfl: 1    ascorbic acid (VITAMIN C) 500 MG tablet, Take 500 mg by mouth daily, Disp: , Rfl:     aspirin 81 mg chewable tablet, Chew 81 mg daily, Disp: , Rfl:     atorvastatin (LIPITOR) 80 mg tablet, Take 1 tablet (80 mg total) by mouth daily, Disp: 90 tablet, Rfl: 1    butalbital-acetaminophen-caffeine (FIORICET,ESGIC) -40 mg per tablet, Take 1 tablet by mouth daily as needed for headaches, Disp: 90 tablet, Rfl: 0    cephalexin (KEFLEX) 500 mg capsule, Take 2,000 mg by mouth 60 minutes pre-procedure Prior to dental procedures, Disp: , Rfl:     cholecalciferol (VITAMIN D3) 1,000 units tablet, , Disp: , Rfl:     Dutasteride-Tamsulosin HCl 0.5-0.4 MG CAPS, Take 1 capsule by mouth daily, Disp: , Rfl:     Iron Combinations (IRON COMPLEX PO), Take 25 mg by mouth daily, Disp: , Rfl:     levothyroxine 75 mcg tablet, Take 1 tablet (75 mcg total) by mouth daily, Disp: 90 tablet, Rfl: 3    lisinopril-hydrochlorothiazide (PRINZIDE,ZESTORETIC) 20-25 MG per tablet, TAKE 1 TABLET DAILY, Disp: 90 tablet, Rfl: 3    Multiple Vitamins-Minerals (CENTRUM SILVER 50+MEN PO), , Disp: , Rfl:     niacin 500 mg tablet, Take 500 mg by mouth daily with breakfast, Disp: , Rfl:     sertraline (ZOLOFT) 50 mg tablet,  Take 1 tablet (50 mg total) by mouth daily, Disp: 90 tablet, Rfl: 1    traZODone (DESYREL) 50 mg tablet, Take 1 tablet (50 mg total) by mouth daily at bedtime, Disp: 30 tablet, Rfl: 2    vitamin E, tocopherol, 400 units capsule, Take 400 Units by mouth daily, Disp: , Rfl:     Vitals:  Vitals:    05/20/24 1330   BP: 118/58   Pulse: 65   Temp: (!) 97.3 °F (36.3 °C)   SpO2: 98%       History:  Past Medical History:   Diagnosis Date    Abnormal weight loss     Anxiety     BPH (benign prostatic hyperplasia)     Bursitis of left hip     Cervical radiculopathy     Colon polyps     Dementia (HCC)     Depression     Disease of thyroid gland     Heart murmur     Hypertension     Nicotine dependence in remission     Non-toxic multinodular goiter     Psychiatric disorder      Past Surgical History:   Procedure Laterality Date    CATARACT EXTRACTION Left     Dr. Macedo; onset: 8/6/13    COLONOSCOPY      3/20/13, normal clon - Dr. Gan, repeat in 5-10 years    HIP SURGERY      IN COLONOSCOPY FLX DX W/COLLJ SPEC WHEN PFRMD N/A 3/28/2018    Procedure: COLONOSCOPY;  Surgeon: CIARRA Gan MD;  Location: [facility];  Service: Colorectal    THYROIDECTOMY, PARTIAL      TONSILLECTOMY      TOTAL HIP ARTHROPLASTY       Family History   Problem Relation Age of Onset    Diabetes Father     Lung cancer Father     Alcohol abuse Paternal Uncle     Heart disease Paternal Uncle      Social History     Socioeconomic History    Marital status: /Civil Union     Spouse name: Not on file    Number of children: Not on file    Years of education: completed some college    Highest education level: Not on file   Occupational History    Not on file   Tobacco Use    Smoking status: Former    Smokeless tobacco: Never   Vaping Use    Vaping status: Never Used   Substance and Sexual Activity    Alcohol use: Yes     Alcohol/week: 2.0 standard drinks of alcohol     Types: 2 Glasses of wine per week     Comment: 12 oz every night    Drug use:  No    Sexual activity: Not on file   Other Topics Concern    Not on file   Social History Narrative    Caffeine use    Marital relationship problem     Social Determinants of Health     Financial Resource Strain: Not on file   Food Insecurity: No Food Insecurity (6/11/2023)    Hunger Vital Sign     Worried About Running Out of Food in the Last Year: Never true     Ran Out of Food in the Last Year: Never true   Transportation Needs: No Transportation Needs (6/11/2023)    PRAPARE - Transportation     Lack of Transportation (Medical): No     Lack of Transportation (Non-Medical): No   Physical Activity: Not on file   Stress: Not on file   Social Connections: Not on file   Intimate Partner Violence: Not on file   Housing Stability: Low Risk  (6/11/2023)    Housing Stability Vital Sign     Unable to Pay for Housing in the Last Year: No     Number of Places Lived in the Last Year: 1     Unstable Housing in the Last Year: No       Past Surgical History:   Procedure Laterality Date    CATARACT EXTRACTION Left     Dr. Macedo; onset: 8/6/13    COLONOSCOPY      3/20/13, normal clon - Dr. Gan, repeat in 5-10 years    HIP SURGERY      DE COLONOSCOPY FLX DX W/COLLJ SPEC WHEN PFRMD N/A 3/28/2018    Procedure: COLONOSCOPY;  Surgeon: CIARRA Gan MD;  Location: [facility];  Service: Colorectal    THYROIDECTOMY, PARTIAL      TONSILLECTOMY      TOTAL HIP ARTHROPLASTY         Physical Exam  Observed Ambulation:  no AD  Physical Exam  Vitals and nursing note reviewed.   Constitutional:       General: He is not in acute distress.     Appearance: Normal appearance. He is well-developed. He is not diaphoretic.   HENT:      Head: Normocephalic.   Cardiovascular:      Rate and Rhythm: Normal rate.      Heart sounds:      No friction rub. No gallop.   Pulmonary:      Effort: Pulmonary effort is normal. No respiratory distress.      Breath sounds: Normal breath sounds. No wheezing or rales.   Abdominal:      General: Bowel sounds  are normal. There is no distension.      Palpations: Abdomen is soft.      Tenderness: There is no abdominal tenderness.   Musculoskeletal:         General: Normal range of motion.      Cervical back: Normal range of motion.   Skin:     General: Skin is warm and dry.   Neurological:      General: No focal deficit present.      Mental Status: He is alert. Mental status is at baseline.      Comments: Oriented to person, partial tps  Pleasant, cooperative, forgetful   Psychiatric:         Mood and Affect: Mood normal.         Behavior: Behavior normal.

## 2024-05-20 NOTE — PROGRESS NOTES
[facility]  [address], [address]  [place] [address]  [phone]    Social Work Intake    Champ Nathan Nicole presents today for a geriatric assessment, accompanied by, spouse, Gina and son, Nikolai.     Social/Family History   Marital status:                                        Spouse's Name:  Gina     Does patient have children?  Nikolai ([place]), Jenelle ([facility]) and Linda ([place])  (If yes, where do the children live?) Jenelle lives with patient and spouse  Family members assisting patient at home: Gina and Jenelle   Are any relationships causing problems right now:  Does not always get along with spouses family    Who is available to provide care in case of illness or crisis (please specify relation to patient / level of care able to provide)? Jonathan        Employment and Education   Education: Highest Level of Education: High School (No Diploma)    Currently Employed: No    Retired:  15 years ago    Type of employment: Computer Programer   :  Yes, [employer]    [place] Benefits (if applicable): None currently, family would like to explore and see if patient would qualify       Lifestyle/Community Services   Clubs and Organizations: No   Major life events in past two years (ex: senior living, death in family, major illness etc.): No    Have you ever used a home care agency, meal delivery service, or respite care?: No   Services that assessment team feels would be beneficial to patient/family:   MSW provided the following resources:   -[facility]: [place] Benefits Program   -SNF List  -PC/group home list  -Home care, Waiver, AAA  -[employer] brochure  -[facility] brochure  -Mom's Meals  -Meals on Wheels  -AIP Resource Guide 2024       Financial   Total Monthly income: $2,700     Source of income: Social Security and Pension  Meet current needs? Yes     Funds available for home care?  Yes, but the lot rent has been a lot     Funds  "available for nursing home?  Unknown, family is looking at       Do you rent or own your home? Rent - Own a modular home and pays a lot rent       End-Of-Life Checklist   Have wishes or desires for end-of-life care been discussed? Yes  Advanced directives: POA (Linda) and Living Will        For all patients, we encourage seeing a  to establish a Financial Power of , a Health Care Power of , and an Advanced Directive (living will). Particularly for patients experiencing memory concerns, we strongly recommend that this is completed as soon as possible.       Safety Assessment   Is the patient still driving? Yes     Is the patient taking medications as prescribed? Yes     Is there a system in place for medication management? Spouse fills the two-week pill box and places his medications in front of him to take when needed  Are firearms or weapons in the home? No  Recommendations per [employer]: removing them from the home, keep ammunition stored separately, encourage patient to consider \"gifting\" them, if necessary, ask local law enforcement for assistance in removing the firearms from the home. The family may receive compensation from a gun buy-back program.    Does the patient live alone? Home with spouse and sonJenelle  What is the layout of the home? One story home with a sunken living room.  6 steps to enter.   Do you feel comfortable leaving the person home alone? Yes    Have you noticed any burned pans or other signs of issues with the stove or other appliances? No   Do you have any concerns about the person’s cooking or eating habits?  Has been eating less and family has noticed some weight loss     Are there working smoke detectors and fire extinguishers in the home? Yes    Have you thought about when it will no longer be safe for the patient to live alone? Family is looking into placement at this time.  They are currently on the wait list for [facility] and open to exploring " other options if necessary     MoCA score: 21/30  Geriatric Depression Scale (GDS) score: 3/15.     MSW met with son, Nikolai, following visit.  Discussed dynamics between patient and patient's spouse and how placement may be best for each of them due to the social aspects.  Patient and spouse can become argumentative with one another at times and family just wants to make them happy.  MSW expressed understanding and discussed placement and resources related to same.  All questions addressed at this time and satisfied with same.  MSW will remain available as needed.

## 2024-05-20 NOTE — ASSESSMENT & PLAN NOTE
Ongoing anxiety issues for long time  Pt currently on sertraline 50mg qd and trazodone 50mg qhs per medication list provided  Cont emotional support, relaxation techniques  Consider  for CBT as needed.

## 2024-05-20 NOTE — ASSESSMENT & PLAN NOTE
Pt has mild hearing loss, no HAs   Per wife, he should wear HAs  As hearing loss can impact cognition, recommend f/u with audiology for regular hearing eval  Speak clearly and toward pt when communicating.  Dec outside noise distractions.

## 2024-05-20 NOTE — ASSESSMENT & PLAN NOTE
Pt feels driving is fine, wife/son are somewhat concerned, yanelis when out of common area.  Discussed with pt/son/wife:  pt will need to have FTD evaluated d/t their concerns.  He is to stay day driving in local area only until completed.   Pt needs to complete eval or will need to notify [employer]  If pt does poorly on FTD will need to notify [employer] of results  Pt/family agreeable.

## 2024-05-20 NOTE — ASSESSMENT & PLAN NOTE
Long term sleep issues that pt has been struggling with  Previously medicated with Etoh- pt/wife stopped this  Now takes trazodone 50mg which wife reports helps nicely with sleep  Per family, pt has not had sleep study - would consider as this can contribute to cognitive fog  Keep active during the day, limit day sleep, encourage good sleep hygiene techniques  Cont to avoid sleep supplements that contain diphenhydramine or benzodiazepines

## 2024-05-20 NOTE — ASSESSMENT & PLAN NOTE
Pt independent with adl, dependent for iadls.  Memory loss:  STM over time, confusion at night, more irritability  MOCA 21/30.  Deficits in all domains but naming, attention, abstraction  CTH, 6/2023:  No acute intracranial abnormality.  Mild progressive ventriculomegaly without evidence of obstructive hydrocephalus. Findings may be secondary to progressive central volume loss. Findings are equivocal for normal pressure hydrocephalus.  [facility]: 6/2023: Folate> 22.3, B12 941.  1/2024: TSH 2.43  History, physical, and cognitive screening are most consistent with:  mild dementia as previously diagnosed  Contributing factors:  mood  Further eval warrants the following:  none  Memory meds:  trial'd aricept, did not tolerate  Cont supportive cares lives at home, family involved  Caregiver stress concerns:  future planning  SW needs this visit:  see intake note  Discussed safe environment  Wandering:  No concerns at this time.   Home:  No concerns at this time.    Driving safety:  family has some concerns, will check FTD  Fall preventions:  fall history, fall precautions  Medication management:  wife takes care of, historically unsure of meds- would benefit from prepacked meds or YVROSE type assistance  Scam safety:  Do not answer suspicious mail, phone calls, email, or text message without having second person review d/t safety risk.  Do not give out personal info to questionable sources- read company safety policies for how they might contact you.  Continue to engage in physical, cognitive, social activity.  Cont doing games, puzzles, trivia, current event discussions  Cont daily walks or exercise video  Cont outings with friends and family.  Avoid social isolation.  Referral to cognitive therapy:  pt declined  Optimize chronic and acute conditions  Cont to f/u with providers and ensure medication compliance  Vascular risk factors:  htn, hld, PAD, prior etoh/smoking  Medication review:  seems appropriate for current  "conditions.  Monitor for inc SE d/t polypharmacy  Ensure good diet (ex Mediterranean diet) and adequate hydration  Monitor for changes in behavior/mood- if noted, notify provider for eval  Encourage mindfulness and positive socialization for general wellness.  Avoid medications that worsen cognition - check with provider or pharmacist before starting new or OTC meds  Do not overwhelm pt with info.    Do one task at a time. Use slower pace.  Keep to one conversation at a time.  Do not multitask.  Decision making:  At this point, recommend making \"bigger\" decisions with poa/care partner.  In future, if capacity is of concern, would refer to neuropsychology for full eval.  Encourage independence as able.  Recommended next follow up: 6 months for cont cog/fxn eval and family/pt support    "

## 2024-05-31 DIAGNOSIS — N40.0 BENIGN PROSTATIC HYPERPLASIA, UNSPECIFIED WHETHER LOWER URINARY TRACT SYMPTOMS PRESENT: Primary | ICD-10-CM

## 2024-06-03 RX ORDER — DUTASTERIDE AND TAMSULOSIN HYDROCHLORIDE CAPSULES .5; .4 MG/1; MG/1
1 CAPSULE ORAL DAILY
Qty: 30 CAPSULE | Refills: 5 | Status: SHIPPED | OUTPATIENT
Start: 2024-06-03

## 2024-06-18 DIAGNOSIS — I10 ESSENTIAL HYPERTENSION: ICD-10-CM

## 2024-06-18 RX ORDER — AMLODIPINE BESYLATE 10 MG/1
10 TABLET ORAL DAILY
Qty: 90 TABLET | Refills: 1 | Status: SHIPPED | OUTPATIENT
Start: 2024-06-18

## 2024-06-18 NOTE — TELEPHONE ENCOUNTER
Reason for call:   [x] Refill   [] Prior Auth  [x] Other: [facility] - NEEDS 90 DAY SUPPLY     Office:   [x] PCP/Provider - Sanya Freeman  [] Specialty/Provider -     Medication: amLODIPine (NORVASC) 10 mg tablet     Dose/Frequency: Take 1 tablet (10 mg total) by mouth daily,     Quantity: 90    Pharmacy:   [facility] - [address]        Does the patient have enough for 3 days?   [] Yes   [x] No - Send as HP to POD

## 2024-07-01 ENCOUNTER — OFFICE VISIT (OUTPATIENT)
Dept: OCCUPATIONAL THERAPY | Facility: REHABILITATION | Age: 79
End: 2024-07-01
Payer: COMMERCIAL

## 2024-07-01 DIAGNOSIS — F03.A4 MILD DEMENTIA WITH ANXIETY, UNSPECIFIED DEMENTIA TYPE (HCC): Primary | ICD-10-CM

## 2024-07-01 PROCEDURE — 96125 COGNITIVE TEST BY HC PRO: CPT | Performed by: OCCUPATIONAL THERAPIST

## 2024-07-01 PROCEDURE — 97166 OT EVAL MOD COMPLEX 45 MIN: CPT | Performed by: OCCUPATIONAL THERAPIST

## 2024-07-01 NOTE — PROGRESS NOTES
This note was not shared with the patient due to privacy exception: note includes other individuals      Occupational Therapy Fit to Drive Evaluation:      Attempt #1    Today's Date: 2024  Patient Name: Champ Nicole  : 1945  MRN: [id number]  Referring Provider: Dori Goldman CRNP  Dx: Mild dementia with anxiety, unspecified dementia type (HCC) [F03.A4]      DCAT/FITNESS TO DRIVE OUTCOMES:     PATIENT'S SCORE: 99 %                DCAT SCORE RANGES:    Low Risk: 1-39% (Passing Score)     Range of Inquiry: 40-69% (On-road test warranted)    High Risk: 70-99% (Failing Score)     DRIVING IMPLICATIONS PER DCAT: This indicates a cognitive competence for driving is outside the range of normal with a higher probability of performing hazardous or extremely dangerous maneuvers.  Until there is a significant change in medical condition or a change in medication use resulting in an improvement in cognitive function, driving suspension or cessation should be seriously considered.  Should there be a significant positive change in medical condition, reassessment at that time would be recommended.  Referring provider  to discuss with Pt.     ADDITIONAL THERAPY NEEDS: No additional Therapy recommended at this time      Assessment    Assessment details: See skilled analysis for further details.     Occupational Therapy Skilled Analysis Assessment and Plan of Care:  Pt is a 79 y.o. male referred to Occupational Therapy for Fitness to Drive Evaluation to assess pt’s cognitive, visual, and motor abilities to drive safely in community environment. Pt is a 79 y.o. male referred to Occupational Therapy for Fitness to Drive Evaluation to assess Pt's cognitive, visual, and motor abilities to drive safely in community environment. The DCAT is a screen that provides objective insight into a person's risk to drive. Pt scoring cognitively at a 99% predicted probability of failing a specialized on-road test.  This indicates  a cognitive competence for driving is outside the range of normal with a higher probability of performing hazardous or extremely dangerous maneuvers.  Until there is a significant change in medical condition or a change in medication use resulting in an improvement in cognitive function, driving suspension or cessation should be seriously considered.  Should there be a significant positive change in medical condition, reassessment at that time would be recommended.  Referring provider  to discuss with Pt.      D/C from OT caseload, D/C OT. Referring provider to discuss results w/ pt.    Active Problem List:   Patient Active Problem List   Diagnosis    Anxiety    Benign prostatic hyperplasia    Cataract    Erectile dysfunction of non-organic origin    Hearing loss    Hyperlipidemia    Hypothyroidism    Impaired fasting glucose    Macrocytosis    Memory loss    Osteoarthritis    Primary localized osteoarthrosis of the hip    PROVISIONAL Obsessive compulsive personality disorder    BMI 25.0-25.9,adult    Chronic intractable headache    Mild dementia (HCC)    Essential hypertension    Alcohol use    Osteochondroma of femur    Primary localized osteoarthritis of pelvic region and thigh    Insomnia    Fall    Aortic stenosis, moderate    Troponin I above reference range    Ambulatory dysfunction    PAOLA (acute kidney injury) (HCC)    PAD (peripheral artery disease) (HCC)    Type II diabetes mellitus with peripheral angiopathy (HCC)    Loose stools    Driving safety issue     Past Medical Hx:   Past Medical History:   Diagnosis Date    Abnormal weight loss     Anxiety     BPH (benign prostatic hyperplasia)     Bursitis of left hip     Cervical radiculopathy     Colon polyps     Dementia (HCC)     Depression     Disease of thyroid gland     Heart murmur     Hypertension     Nicotine dependence in remission     Non-toxic multinodular goiter     Psychiatric disorder      Past Surgical Hx:   Past Surgical History:   Procedure  "Laterality Date    CATARACT EXTRACTION Left     Dr. Macedo; onset: 11    COLONOSCOPY      3/20/13, normal clon - Dr. Gan, repeat in 5-10 years    HIP SURGERY      MS COLONOSCOPY FLX DX W/COLLJ SPEC WHEN PFRMD N/A 3/28/2018    Procedure: COLONOSCOPY;  Surgeon: CIARRA Gan MD;  Location: [facility];  Service: Colorectal    THYROIDECTOMY, PARTIAL      TONSILLECTOMY      TOTAL HIP ARTHROPLASTY          Pain Levels:   Restin    With Activity:  7    Pain in lower back, bilateral thighs, and bilateral knees      TIME:   OT eval:   OT DCAT     Subjective: \"My son made the appt, so I am not sure.\"    Patient Goal: \"Continue  role limited to local distances.\"      History of Present Illness:  Pt is a 79 y.o. male seen for OT Fitness to Drive evaluation to assess pt’s cognitive, visual, and motor abilities to drive safely in community environment. Pt referred from Dori Goldman CRNP from Geriatrics. Pt with poor insight regarding memory difficulties-- states that he does not have memory difficulties.  Pt arrived to evaluation with wife on this date who provided OTR more information regarding forgetfulness, confusion, and STM difficulties.  As per chart review, Pt not taking it well that he is 80 yo.  He takes a lot of naps.  He plays games on computers, then he goes back to sleep.  Not much physcial, cognitive, social activity.  Slowing of thought process, inc confusions.  Can remember long term, but nothing short term.  Had to remember twice what day of the week it was.  He refuses to wear Hearing aides. Increased aggrevation also.  It stems from confusion and hearing loss, gets lost in convo or in situation.  Pt has more trouble at night.  He has a lot of trouble sleeping at night.  He is up at night yelling, tossing, sometimes wanders the house, making sure that everything is off.  Then he falls asleep late in the morning.  Trazodone is helping the sleep pattern.  He's not " yelling at night too much.  Wife is looking at [facility]- he was against it because he thought it was a nursing home.  He is not interested in social activity. He's still not convinced that he needs to go. He need repeating a lot to understand things.    Per son:  they are at each other all the time.  Get into screaming matches.  Unsure how to handle.  Family Is looking into long term w/hope that she will have more social time and less house work time, he will have more time to rest as he likes.     Pt lives in a one story home with wife and older son with 6 PER.  Pt performing all ADLs/IADLs at independent status without difficulties reported.  Pt's wife is managing finances and medications at this time.  Pt is a retired -- Pt retired in 2009.  Pt is also a retired [employer].  Pt continues the  role- Pt does have one occurrence of becoming lost while driving requiring to call wife to get directions back home.     Below is a summary of patients current or most recent driving history including vehicle type, roads traveled, and recent auto events.    Driving History:    Communication Status: X English,     Albanian    Visual History:  Last Eye DrAlfredo Appointment 1 year ago   Glasses/Contacts:   Yes, describe: bifocals worn   Cataracts:  Yes, describe: removed in bilateral eyes   Glaucoma:   No   Hemianopsia:   No         License Status: active    Age of Initial Licensure: 14 y.o.    Vehicle Type:     CAR     Car Transfer: independent    Driving History:   GPS Use: No   Recent Accidents:   No   History of Getting Lost While Driving: x1   Tickets:   Yes, describe: speeding tickets   DUI:   No    Last Drove: yesterday    Driving Goals:   Local Roads      Objective    DCAT: (see attached report for further details)   Pt scoring an 99% likelihood on failing on road see attached report for further details.     Recommend On Road Assessment?:   No     Recommend to [employer] for Adaptive  Equipment?: No      DCAT Mobile Battery Cognitive Skills Analysis  These scores are the participant's performance comparatively to the general driving population from a stratified sample of drivers ages  for their cognitive skills required for safe driving. If the participant's score is unusually poor for their age group, it will impact the overall driveable score.     For this analysis, scores in the low percentile range indicate a high risk  while a high percentile range indicate lower risk:    Mental Flexibility: 3%  Population Percentile  Lower scores show a decreased ability to switch between mental sets and connect sequential targets, showing increased driving risk    Judgement:  1%  Population Percentile  Lowers scores indicate decreased ability to respond quickly and accurate when provided with complex judgement is rquired and showing increased driving risk    Memory: 3%  Population Percentile  Lowers scores indicate decreased working memory in the presence of distractions, showing increased driving risk    Control:  1% Population Percentile  Lower scores indicate decreased FMC and executive function, showing increased driving risk         OPTEC vision screen: (see attached)   Contrast sensitivity: R intact; L abnormal   Far acuity: 20/20 bilaterally     Near acuity: 20/20 bilaterally    Color perception: Impaired   Lateral phoria: unable to identify    Depth perception far: Impaired   Sign recognition: able to ID 9/12 road signs correctly; able to identify 0/3 depth perceptive tasks   Color recognition: Intact      Reaction time trials: (see attached)  Average of 3 trials: 0.806 seconds  Falling within the less than 25 %ile for reaction time.      Rapid Pace Walk Test: (Those with greater than 9 seconds had a 3 fold increase risk of being in an at fault auto accident.)  Time: WFL      Physical findings:    Cervical rotation:  R 60*, L 60*   UE and LE AROM:  WFL  UE: 5/5 MMT   LE: 5/5  MMT      INTERVENTION COMMENTS:  Diagnosis: Mild dementia with anxiety, unspecified dementia type (HCC) [F03.A4]  Precautions: anxiety, DM II  Insurance: Payor: [employer]  REP / Plan: [employer] PPO  REP / Product Type: Medicare PPO /

## 2024-07-03 ENCOUNTER — TELEPHONE (OUTPATIENT)
Age: 79
End: 2024-07-03

## 2024-07-03 NOTE — TELEPHONE ENCOUNTER
Called Pt's son Nikolai, who is on the Communication Consent form scanned in Pt's chart. LM on  regarding Provider Dori Goldman' msg. First available appt as of call was 7/16/24 @ 0800, can be virtual. Left Office Contact information and advised that Pt should not drive until results are reviewed.       ----- Message from LOLLY Cardenas sent at 7/3/2024  8:36 AM EDT -----    Please make appt with pt to review FTD - can be for one of the 1/2 slots.  Can be virtual.  Please let son know he should not be driving until results reviewed.  Ty

## 2024-07-11 DIAGNOSIS — G47.00 INSOMNIA, UNSPECIFIED TYPE: ICD-10-CM

## 2024-07-11 RX ORDER — TRAZODONE HYDROCHLORIDE 50 MG/1
50 TABLET ORAL
Qty: 100 TABLET | Refills: 1 | Status: SHIPPED | OUTPATIENT
Start: 2024-07-11

## 2024-07-11 NOTE — TELEPHONE ENCOUNTER
Reason for call:   [x] Refill   [] Prior Auth  [] Other:     Office:   [x] PCP/Provider - S Benji ARREAGA / Pete    Medication: trazodone    Dose/Frequency: 50mg qhs    Quantity: 100    Pharmacy: [facility] - [place], [place] - [address] [place] [address]     Does the patient have enough for 3 days?   [x] Yes   [] No - Send as HP to POD

## 2024-07-12 ENCOUNTER — TELEPHONE (OUTPATIENT)
Age: 79
End: 2024-07-12

## 2024-07-12 LAB
ALBUMIN SERPL-MCNC: 4.3 G/DL (ref 3.5–5.7)
ALP SERPL-CCNC: 49 U/L (ref 35–120)
ALT SERPL-CCNC: 21 U/L
ANION GAP SERPL CALCULATED.3IONS-SCNC: 11 MMOL/L (ref 3–11)
AST SERPL-CCNC: 12 U/L
BILIRUB SERPL-MCNC: 0.4 MG/DL (ref 0.2–1)
BUN SERPL-MCNC: 35 MG/DL (ref 7–28)
CALCIUM SERPL-MCNC: 9.9 MG/DL (ref 8.5–10.1)
CHLORIDE SERPL-SCNC: 104 MMOL/L (ref 100–109)
CHOLEST SERPL-MCNC: 101 MG/DL
CHOLEST/HDLC SERPL: 2.3 {RATIO}
CO2 SERPL-SCNC: 26 MMOL/L (ref 21–31)
CREAT SERPL-MCNC: 1.03 MG/DL (ref 0.53–1.3)
CYTOLOGY CMNT CVX/VAG CYTO-IMP: ABNORMAL
EST. AVERAGE GLUCOSE BLD GHB EST-MCNC: 126 MG/DL
GFR/BSA.PRED SERPLBLD CYS-BASED-ARV: 73 ML/MIN/{1.73_M2}
GLUCOSE SERPL-MCNC: 99 MG/DL (ref 65–99)
HBA1C MFR BLD: 6 %
HDLC SERPL-MCNC: 44 MG/DL (ref 23–92)
LDLC SERPL CALC-MCNC: 44 MG/DL
NONHDLC SERPL-MCNC: 57 MG/DL
POTASSIUM SERPL-SCNC: 5 MMOL/L (ref 3.5–5.2)
PROT SERPL-MCNC: 6.5 G/DL (ref 6.3–8.3)
SODIUM SERPL-SCNC: 141 MMOL/L (ref 135–145)
TRIGL SERPL-MCNC: 65 MG/DL
TSH SERPL-ACNC: 2.9 UIU/ML (ref 0.45–5.33)

## 2024-07-16 ENCOUNTER — OFFICE VISIT (OUTPATIENT)
Dept: FAMILY MEDICINE CLINIC | Facility: CLINIC | Age: 79
End: 2024-07-16
Payer: COMMERCIAL

## 2024-07-16 VITALS
OXYGEN SATURATION: 96 % | BODY MASS INDEX: 23.34 KG/M2 | DIASTOLIC BLOOD PRESSURE: 60 MMHG | WEIGHT: 163 LBS | SYSTOLIC BLOOD PRESSURE: 110 MMHG | TEMPERATURE: 97.1 F | HEART RATE: 68 BPM | RESPIRATION RATE: 16 BRPM | HEIGHT: 70 IN

## 2024-07-16 DIAGNOSIS — F60.5 OBSESSIVE COMPULSIVE PERSONALITY DISORDER (HCC): ICD-10-CM

## 2024-07-16 DIAGNOSIS — N40.0 BENIGN PROSTATIC HYPERPLASIA, UNSPECIFIED WHETHER LOWER URINARY TRACT SYMPTOMS PRESENT: ICD-10-CM

## 2024-07-16 DIAGNOSIS — E78.5 HYPERLIPIDEMIA, UNSPECIFIED HYPERLIPIDEMIA TYPE: ICD-10-CM

## 2024-07-16 DIAGNOSIS — G89.29 CHRONIC INTRACTABLE HEADACHE, UNSPECIFIED HEADACHE TYPE: ICD-10-CM

## 2024-07-16 DIAGNOSIS — F03.A4 MILD DEMENTIA WITH ANXIETY, UNSPECIFIED DEMENTIA TYPE (HCC): ICD-10-CM

## 2024-07-16 DIAGNOSIS — R51.9 CHRONIC INTRACTABLE HEADACHE, UNSPECIFIED HEADACHE TYPE: ICD-10-CM

## 2024-07-16 DIAGNOSIS — G47.00 INSOMNIA, UNSPECIFIED TYPE: ICD-10-CM

## 2024-07-16 DIAGNOSIS — E11.51 TYPE II DIABETES MELLITUS WITH PERIPHERAL ANGIOPATHY (HCC): Primary | ICD-10-CM

## 2024-07-16 DIAGNOSIS — I73.9 PAD (PERIPHERAL ARTERY DISEASE) (HCC): ICD-10-CM

## 2024-07-16 DIAGNOSIS — I10 ESSENTIAL HYPERTENSION: ICD-10-CM

## 2024-07-16 DIAGNOSIS — E03.9 HYPOTHYROIDISM, UNSPECIFIED TYPE: ICD-10-CM
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
## 2024-07-16 PROBLEM — N17.9 AKI (ACUTE KIDNEY INJURY) (HCC): Status: RESOLVED | Noted: 2023-07-13 | Resolved: 2024-07-16

## 2024-07-16 PROCEDURE — G2211 COMPLEX E/M VISIT ADD ON: HCPCS | Performed by: FAMILY MEDICINE

## 2024-07-16 PROCEDURE — 99214 OFFICE O/P EST MOD 30 MIN: CPT | Performed by: FAMILY MEDICINE

## 2024-07-16 RX ORDER — BUTALBITAL, ACETAMINOPHEN AND CAFFEINE 50; 325; 40 MG/1; MG/1; MG/1
1 TABLET ORAL DAILY PRN
Qty: 90 TABLET | Refills: 0 | Status: SHIPPED | OUTPATIENT
Start: 2024-07-16 | End: 2024-07-24 | Stop reason: SDUPTHER

## 2024-07-16 NOTE — PROGRESS NOTES
Ambulatory Visit  Name: Champ Nicole      : 1945      MRN: [id number]  Encounter Provider: Sanya Freeman MD  Encounter Date: 2024   Encounter department: [facility]  Chief Complaint   Patient presents with    Follow-up     6 month f/u      Health Maintenance   Topic Date Due    Diabetic Foot Exam  Never done    Hepatitis A Vaccine (1 of 2 - Risk 2-dose series) Never done    RSV Vaccine Age 60+ Years (1 - 1-dose 60+ series) Never done    Zoster Vaccine (2 of 3) 2013    DM Eye Exam  2021    Falls: Plan of Care  2024    Influenza Vaccine (1) 2024    Medicare Annual Wellness Visit (AWV)  10/13/2024    OT PLAN OF CARE  2024    Depression Screening  2025    Fall Risk  2025    HEMOGLOBIN A1C  2025    Pneumococcal Vaccine: 65+ Years  Completed    COVID-19 Vaccine  Completed    RSV Vaccine age 0-20 Months  Aged Out    HIB Vaccine  Aged Out    IPV Vaccine  Aged Out    Meningococcal ACWY Vaccine  Aged Out    HPV Vaccine  Aged Out    Colorectal Cancer Screening  Discontinued     Assessment & Plan   1. Type II diabetes mellitus with peripheral angiopathy (HCC)  Assessment & Plan:    Lab Results   Component Value Date    HGBA1C 6.0 (H) 2024     Diet control.   Orders:  -     Albumin / creatinine urine ratio; Future  -     CBC; Future  -     Comprehensive metabolic panel; Future; Expected date: 2025  -     Hemoglobin A1C; Future; Expected date: 2025  -     Lipid panel; Future; Expected date: 2025  -     TSH, 3rd generation with Free T4 reflex; Future; Expected date: 2025  -     Albumin / creatinine urine ratio  -     CBC  -     Comprehensive metabolic panel  -     Hemoglobin A1C  -     Lipid panel  -     TSH, 3rd generation with Free T4 reflex  2. Chronic intractable headache, unspecified headache type  Assessment & Plan:  Continue fioricet as needed. SE educated pt. Give refill today.   Orders:  -      butalbital-acetaminophen-caffeine (FIORICET,ESGIC) -40 mg per tablet; Take 1 tablet by mouth daily as needed for headaches  3. Essential hypertension  Assessment & Plan:  DASH diet. Continue current meds.   Orders:  -     Albumin / creatinine urine ratio; Future  -     CBC; Future  -     Comprehensive metabolic panel; Future; Expected date: 01/02/2025  -     Hemoglobin A1C; Future; Expected date: 01/02/2025  -     Lipid panel; Future; Expected date: 01/02/2025  -     TSH, 3rd generation with Free T4 reflex; Future; Expected date: 01/02/2025  -     Albumin / creatinine urine ratio  -     CBC  -     Comprehensive metabolic panel  -     Hemoglobin A1C  -     Lipid panel  -     TSH, 3rd generation with Free T4 reflex  4. PAD (peripheral artery disease) (HCC)  Assessment & Plan:  FU vascular surg. Continue statin and ASA.   Orders:  -     Albumin / creatinine urine ratio; Future  -     CBC; Future  -     Comprehensive metabolic panel; Future; Expected date: 01/02/2025  -     Hemoglobin A1C; Future; Expected date: 01/02/2025  -     Lipid panel; Future; Expected date: 01/02/2025  -     TSH, 3rd generation with Free T4 reflex; Future; Expected date: 01/02/2025  -     Albumin / creatinine urine ratio  -     CBC  -     Comprehensive metabolic panel  -     Hemoglobin A1C  -     Lipid panel  -     TSH, 3rd generation with Free T4 reflex  5. Hypothyroidism, unspecified type  Assessment & Plan:  7/2024 TSH normal. Continue levothyroxine 75mcg daily.   Orders:  -     Albumin / creatinine urine ratio; Future  -     CBC; Future  -     Comprehensive metabolic panel; Future; Expected date: 01/02/2025  -     Hemoglobin A1C; Future; Expected date: 01/02/2025  -     Lipid panel; Future; Expected date: 01/02/2025  -     TSH, 3rd generation with Free T4 reflex; Future; Expected date: 01/02/2025  -     Albumin / creatinine urine ratio  -     CBC  -     Comprehensive metabolic panel  -     Hemoglobin A1C  -     Lipid panel  -     TSH,  3rd generation with Free T4 reflex  6. Mild dementia with anxiety, unspecified dementia type (HCC)  -     Albumin / creatinine urine ratio; Future  -     CBC; Future  -     Comprehensive metabolic panel; Future; Expected date: 01/02/2025  -     Hemoglobin A1C; Future; Expected date: 01/02/2025  -     Lipid panel; Future; Expected date: 01/02/2025  -     TSH, 3rd generation with Free T4 reflex; Future; Expected date: 01/02/2025  -     Albumin / creatinine urine ratio  -     CBC  -     Comprehensive metabolic panel  -     Hemoglobin A1C  -     Lipid panel  -     TSH, 3rd generation with Free T4 reflex  7. Insomnia, unspecified type  Assessment & Plan:  Continue trazodone 50mg qhs.   Orders:  -     Albumin / creatinine urine ratio; Future  -     CBC; Future  -     Comprehensive metabolic panel; Future; Expected date: 01/02/2025  -     Hemoglobin A1C; Future; Expected date: 01/02/2025  -     Lipid panel; Future; Expected date: 01/02/2025  -     TSH, 3rd generation with Free T4 reflex; Future; Expected date: 01/02/2025  -     Albumin / creatinine urine ratio  -     CBC  -     Comprehensive metabolic panel  -     Hemoglobin A1C  -     Lipid panel  -     TSH, 3rd generation with Free T4 reflex  8. Obsessive compulsive personality disorder (HCC)  Assessment & Plan:  Continue zoloft.   9. Hyperlipidemia, unspecified hyperlipidemia type  Assessment & Plan:  7/2024 lipid good. Low fat diet. Continue atorvastatin 80mg qhs.   10. Benign prostatic hyperplasia, unspecified whether lower urinary tract symptoms present  Assessment & Plan:  Continue Lolly. FU urology yearly.       Falls Plan of Care: balance, strength, and gait training instructions were provided.       Reviewed lab in 7/2024  TSH normal  CMP ok  Lipid 101/65/44/44 ok  HgA1C 6.0 stable    Flu shot yearly.  Got PCV13 and pneumovax.   RTO in 6 months.         History of Present Illness     HPI       Pt is here by himself.     Mild dementia---He is off Aricept because  "side effects.  Saw geriatrics recently.      Anxiety/OCD---He stopped prozac 80mg QD. He is on zoloft 50mg daily. Denies SE. Feels ok.     Insomnia---he is on trazodone 50mg qhs which helped.     DM---Diet control. 7/2024 HgA1C 6.0  PAD---FU vascular surgeon. He is on statin and ASA 81mg QD.      HTN---He is on lisinopril-HCTZ 20-25mg QD and amlodipine 10mg QD. Denies SE.   Denies headache, SOB, CP.      Hyperlipidemia---He is on atorvastatin 80mg qhs. Denies SE.      Hypothyroidism---He is on levothyroxine 75mcg daily. Feels fine.      AS---FU cardiology. 6/2023 Echo done.       Tension headache---use fioricet as needed, not use it everyday. FU neurology yearly.       BPH---FU urology Dr. Alvarado for BPH yearly. He is on Lolly which helped.      FU opthalmology yearly.      No smoking.   Stopped alcohol per pt.      Lives with wife and older son. Does all ADL's.   Denies recent falls.   Denies depression.       Review of Systems   Constitutional:  Negative for appetite change, chills and fever.   HENT:  Negative for congestion, ear pain, sinus pain and sore throat.    Eyes:  Negative for discharge and itching.   Respiratory:  Negative for apnea, cough, chest tightness, shortness of breath and wheezing.    Cardiovascular:  Negative for chest pain, palpitations and leg swelling.   Gastrointestinal:  Negative for abdominal pain, anal bleeding, constipation, diarrhea, nausea and vomiting.   Endocrine: Negative for cold intolerance, heat intolerance and polyuria.   Genitourinary:  Negative for difficulty urinating and dysuria.   Musculoskeletal:  Negative for arthralgias, back pain and myalgias.   Skin:  Negative for rash.   Neurological:  Negative for dizziness and headaches.   Psychiatric/Behavioral:  Negative for agitation.        Objective     /60   Pulse 68   Temp (!) 97.1 °F (36.2 °C) (Temporal)   Resp 16   Ht 5' 10.28\" (1.785 m)   Wt 73.9 kg (163 lb)   SpO2 96%   BMI 23.20 kg/m²     Physical " Exam  Constitutional:       Appearance: He is well-developed.   HENT:      Head: Normocephalic and atraumatic.   Eyes:      General:         Right eye: No discharge.         Left eye: No discharge.      Conjunctiva/sclera: Conjunctivae normal.   Cardiovascular:      Rate and Rhythm: Normal rate and regular rhythm.      Pulses: Pulses are weak.           Dorsalis pedis pulses are 1+ on the right side and 1+ on the left side.      Heart sounds: Murmur heard.      No friction rub. No gallop.   Pulmonary:      Effort: Pulmonary effort is normal. No respiratory distress.      Breath sounds: Normal breath sounds. No wheezing or rales.   Abdominal:      General: Bowel sounds are normal. There is no distension.      Palpations: Abdomen is soft.      Tenderness: There is no abdominal tenderness. There is no guarding.   Musculoskeletal:         General: Normal range of motion.      Cervical back: Normal range of motion and neck supple.      Right lower leg: No edema.      Left lower leg: No edema.   Feet:      Right foot:      Skin integrity: No ulcer, skin breakdown, erythema, warmth, callus or dry skin.      Left foot:      Skin integrity: No ulcer, skin breakdown, erythema, warmth, callus or dry skin.   Neurological:      Mental Status: He is alert.       Patient's shoes and socks removed.    Right Foot/Ankle   Right Foot Inspection  Skin Exam: skin normal and skin intact. No dry skin, no warmth, no callus, no erythema, no maceration, no abnormal color, no pre-ulcer, no ulcer and no callus.     Toe Exam: ROM and strength within normal limits.     Sensory   Monofilament testing: intact    Vascular  The right DP pulse is 1+.     Left Foot/Ankle  Left Foot Inspection  Skin Exam: skin normal and skin intact. No dry skin, no warmth, no erythema, no maceration, normal color, no pre-ulcer, no ulcer and no callus.     Toe Exam: ROM and strength within normal limits.     Sensory   Monofilament testing: intact    Vascular  The left  DP pulse is 1+.     Assign Risk Category  No deformity present  No loss of protective sensation  Weak pulses  Risk: 2

## 2024-07-18 ENCOUNTER — TELEMEDICINE (OUTPATIENT)
Age: 79
End: 2024-07-18
Payer: COMMERCIAL

## 2024-07-18 DIAGNOSIS — Z91.89 DRIVING SAFETY ISSUE: Primary | ICD-10-CM

## 2024-07-18 PROCEDURE — 99213 OFFICE O/P EST LOW 20 MIN: CPT | Performed by: NURSE PRACTITIONER

## 2024-07-18 NOTE — ASSESSMENT & PLAN NOTE
Patient's FTD results reviewed today with him via video.  His wife and son, Jenelle were also present.  Patient's score was 99% predicted probability of failing a specialist on the road test.  This indicates cognitive competence for driving is outside the range of normal with a higher probability of performing hazardous or extremely dangerous maneuvers.  Recommendation is to cease driving.  Patient scored poorly in areas of mental flexibility, judgment, memory, control  Patient aware that [facility] will need to be notified of results.  They will contact him with additional information whether he can test to maintain license or cease driving.  [facility] .  Patient and wife verbalized understanding  Patient declines alternative transportation list, he states that his wife will be able to take him where he needs to go  Did attempt to call patient's son Nikolai to update, but unable to reach at this time.  Can mail hard copy of results to pt if needed as they do not appear in [employer].

## 2024-07-18 NOTE — PROGRESS NOTES
Virtual Regular Visit  Name: Champ Nicole      : 1945      MRN: [id number]  Encounter Provider: LOLLY Reeves  Encounter Date: 2024   Encounter department: [facility]    Verification of patient location:    Patient is located at Home in the following state in which I hold an active license [place]    Assessment & Plan   1. Driving safety issue  Assessment & Plan:  Patient's FTD results reviewed today with him via video.  His wife and son, Jenelle were also present.  Patient's score was 99% predicted probability of failing a specialist on the road test.  This indicates cognitive competence for driving is outside the range of normal with a higher probability of performing hazardous or extremely dangerous maneuvers.  Recommendation is to cease driving.  Patient scored poorly in areas of mental flexibility, judgment, memory, control  Patient aware that [employer] will need to be notified of results.  They will contact him with additional information whether he can test to maintain license or cease driving.  No driving until contacted by [employer] .  Patient and wife verbalized understanding  Patient declines alternative transportation list, he states that his wife will be able to take him where he needs to go  Did attempt to call patient's son Nikolai to update, but unable to reach at this time.  Can mail hard copy of results to pt if needed as they do not appear in [employer].        Encounter provider LOLLY Reeves    The patient was identified by name and date of birth. Champ Nicole was informed that this is a telemedicine visit and that the visit is being conducted through the Epic Embedded platform. He agrees to proceed..  My office door was closed. No one else was in the room.  He acknowledged consent and understanding of privacy and security of the video platform. The patient has agreed to participate and understands they can discontinue the visit at any  time.    Patient is aware this is a billable service.     History of Present Illness     Champ Nicole is a 79 y.o. male who presents via video with his wife and son, Jenelle for review of FTD.  He is doing well without complaints.  He felt he did fine on eval.  He wife reports he only drives locally, she drives when out of area.    Review of Systems   Constitutional:  Negative for activity change.   HENT:  Negative for hearing loss.    Eyes:  Negative for visual disturbance.     Pertinent Medical History   Mild dementia, ambulatory dysfunction      Medical History Reviewed by provider this encounter:  Tobacco  Allergies  Meds  Problems  Med Hx  Surg Hx  Fam Hx       Past Medical History   Past Medical History:   Diagnosis Date    Abnormal weight loss     Anxiety     BPH (benign prostatic hyperplasia)     Bursitis of left hip     Cervical radiculopathy     Colon polyps     Dementia (HCC)     Depression     Disease of thyroid gland     Fall 06/10/2023    Heart murmur     Hypertension     Nicotine dependence in remission     Non-toxic multinodular goiter     Psychiatric disorder      Past Surgical History:   Procedure Laterality Date    CATARACT EXTRACTION Left     Dr. Macedo; onset: 8/6/13    COLONOSCOPY      3/20/13, normal clon - Dr. Gan, repeat in 5-10 years    HIP SURGERY      JOINT REPLACEMENT      KS COLONOSCOPY FLX DX W/COLLJ SPEC WHEN PFRMD N/A 03/28/2018    Procedure: COLONOSCOPY;  Surgeon: CIARRA Gan MD;  Location: [facility];  Service: Colorectal    THYROIDECTOMY, PARTIAL      TONSILLECTOMY      TOTAL HIP ARTHROPLASTY       Family History   Problem Relation Age of Onset    Diabetes Father     Lung cancer Father     Prostate cancer Father     Cancer Father     Alcohol abuse Paternal Uncle     Heart disease Paternal Uncle      Current Outpatient Medications on File Prior to Visit   Medication Sig Dispense Refill    Acetaminophen (TYLENOL PO) Take by mouth as needed      amLODIPine  (NORVASC) 10 mg tablet Take 1 tablet (10 mg total) by mouth daily 90 tablet 1    ascorbic acid (VITAMIN C) 500 MG tablet Take 500 mg by mouth daily      aspirin 81 mg chewable tablet Chew 81 mg daily      atorvastatin (LIPITOR) 80 mg tablet Take 1 tablet (80 mg total) by mouth daily 90 tablet 1    butalbital-acetaminophen-caffeine (FIORICET,ESGIC) -40 mg per tablet Take 1 tablet by mouth daily as needed for headaches 90 tablet 0    cephalexin (KEFLEX) 500 mg capsule Take 2,000 mg by mouth 60 minutes pre-procedure Prior to dental procedures      cholecalciferol (VITAMIN D3) 1,000 units tablet       Dutasteride-Tamsulosin HCl 0.5-0.4 MG CAPS Take 1 capsule by mouth daily 30 capsule 5    Iron Combinations (IRON COMPLEX PO) Take 25 mg by mouth daily      levothyroxine 75 mcg tablet Take 1 tablet (75 mcg total) by mouth daily 90 tablet 3    lisinopril-hydrochlorothiazide (PRINZIDE,ZESTORETIC) 20-25 MG per tablet TAKE 1 TABLET DAILY 90 tablet 3    Multiple Vitamins-Minerals (CENTRUM SILVER 50+MEN PO)       niacin 500 mg tablet Take 500 mg by mouth daily with breakfast      sertraline (ZOLOFT) 50 mg tablet Take 1 tablet (50 mg total) by mouth daily 90 tablet 1    traZODone (DESYREL) 50 mg tablet Take 1 tablet (50 mg total) by mouth daily at bedtime 100 tablet 1    vitamin E, tocopherol, 400 units capsule Take 400 Units by mouth daily       No current facility-administered medications on file prior to visit.   No Known Allergies   Current Outpatient Medications on File Prior to Visit   Medication Sig Dispense Refill    Acetaminophen (TYLENOL PO) Take by mouth as needed      amLODIPine (NORVASC) 10 mg tablet Take 1 tablet (10 mg total) by mouth daily 90 tablet 1    ascorbic acid (VITAMIN C) 500 MG tablet Take 500 mg by mouth daily      aspirin 81 mg chewable tablet Chew 81 mg daily      atorvastatin (LIPITOR) 80 mg tablet Take 1 tablet (80 mg total) by mouth daily 90 tablet 1    butalbital-acetaminophen-caffeine  (FIORICET,ESGIC) -40 mg per tablet Take 1 tablet by mouth daily as needed for headaches 90 tablet 0    cephalexin (KEFLEX) 500 mg capsule Take 2,000 mg by mouth 60 minutes pre-procedure Prior to dental procedures      cholecalciferol (VITAMIN D3) 1,000 units tablet       Dutasteride-Tamsulosin HCl 0.5-0.4 MG CAPS Take 1 capsule by mouth daily 30 capsule 5    Iron Combinations (IRON COMPLEX PO) Take 25 mg by mouth daily      levothyroxine 75 mcg tablet Take 1 tablet (75 mcg total) by mouth daily 90 tablet 3    lisinopril-hydrochlorothiazide (PRINZIDE,ZESTORETIC) 20-25 MG per tablet TAKE 1 TABLET DAILY 90 tablet 3    Multiple Vitamins-Minerals (CENTRUM SILVER 50+MEN PO)       niacin 500 mg tablet Take 500 mg by mouth daily with breakfast      sertraline (ZOLOFT) 50 mg tablet Take 1 tablet (50 mg total) by mouth daily 90 tablet 1    traZODone (DESYREL) 50 mg tablet Take 1 tablet (50 mg total) by mouth daily at bedtime 100 tablet 1    vitamin E, tocopherol, 400 units capsule Take 400 Units by mouth daily       No current facility-administered medications on file prior to visit.      Social History     Tobacco Use    Smoking status: Former     Current packs/day: 0.00     Average packs/day: 0.5 packs/day for 26.5 years (13.3 ttl pk-yrs)     Types: Cigarettes     Start date: 1959     Quit date: 1985     Years since quittin.9     Passive exposure: Never    Smokeless tobacco: Never   Vaping Use    Vaping status: Never Used   Substance and Sexual Activity    Alcohol use: Not Currently     Alcohol/week: 7.0 standard drinks of alcohol     Types: 7 Glasses of wine per week     Comment: 12 oz every night    Drug use: No    Sexual activity: Not Currently     Partners: Female     Birth control/protection: Abstinence     Objective     There were no vitals taken for this visit.  Physical Exam  Vitals and nursing note reviewed.   Constitutional:       General: He is not in acute distress.     Appearance: Normal  appearance. He is not ill-appearing.   HENT:      Head: Normocephalic.   Pulmonary:      Effort: Pulmonary effort is normal. No respiratory distress.      Breath sounds: No wheezing.   Musculoskeletal:         General: Normal range of motion.   Skin:     General: Skin is dry.   Neurological:      General: No focal deficit present.      Mental Status: He is alert. Mental status is at baseline.      Comments: Pleasant, cooperative,f orgetful   Psychiatric:         Mood and Affect: Mood normal.         Behavior: Behavior normal.         Visit Time  Total Visit Duration: 12 minutes

## 2024-07-24 DIAGNOSIS — G89.29 CHRONIC INTRACTABLE HEADACHE, UNSPECIFIED HEADACHE TYPE: ICD-10-CM

## 2024-07-24 DIAGNOSIS — R51.9 CHRONIC INTRACTABLE HEADACHE, UNSPECIFIED HEADACHE TYPE: ICD-10-CM

## 2024-07-24 RX ORDER — BUTALBITAL, ACETAMINOPHEN AND CAFFEINE 50; 325; 40 MG/1; MG/1; MG/1
1 TABLET ORAL DAILY PRN
Qty: 100 TABLET | Refills: 0 | Status: SHIPPED | OUTPATIENT
Start: 2024-07-24

## 2024-07-24 NOTE — TELEPHONE ENCOUNTER
Reason for call:   [x] Refill   [] Prior Auth  [x] Other: NOT A DUPLICATE. Was sent to wrong pharm      Office:   [x] PCP/Provider - : Sanya Freeman MD   [] Specialty/Provider -     Medication:     butalbital-acetaminophen-caffeine (FIORICET,ESGIC) -40 mg per tablet       Dose/Frequency:     Take 1 tablet by mouth daily as needed for headaches       Quantity: 100    Pharmacy: [facility] - [address] [phone]     Does the patient have enough for 3 days?   [] Yes   [x] No - Send as HP to POD

## 2024-08-02 ENCOUNTER — HOSPITAL ENCOUNTER (EMERGENCY)
Facility: HOSPITAL | Age: 79
Discharge: HOME | End: 2024-08-03
Attending: EMERGENCY MEDICINE
Payer: COMMERCIAL

## 2024-08-02 DIAGNOSIS — N40.0 ENLARGED PROSTATE: ICD-10-CM

## 2024-08-02 DIAGNOSIS — K56.41 FECAL IMPACTION IN RECTUM (HCC): Primary | ICD-10-CM

## 2024-08-02 PROCEDURE — 99285 EMERGENCY DEPT VISIT HI MDM: CPT

## 2024-08-02 PROCEDURE — 99284 EMERGENCY DEPT VISIT MOD MDM: CPT

## 2024-08-03 ENCOUNTER — APPOINTMENT (EMERGENCY)
Dept: CT IMAGING | Facility: HOSPITAL | Age: 79
End: 2024-08-03
Payer: COMMERCIAL

## 2024-08-03 VITALS
HEART RATE: 81 BPM | OXYGEN SATURATION: 97 % | RESPIRATION RATE: 20 BRPM | BODY MASS INDEX: 22.94 KG/M2 | WEIGHT: 161.16 LBS | DIASTOLIC BLOOD PRESSURE: 72 MMHG | SYSTOLIC BLOOD PRESSURE: 158 MMHG | TEMPERATURE: 98.2 F

## 2024-08-03 LAB
ALBUMIN SERPL BCG-MCNC: 4.2 G/DL (ref 3.5–5)
ALP SERPL-CCNC: 55 U/L (ref 34–104)
ALT SERPL W P-5'-P-CCNC: 34 U/L (ref 7–52)
ANION GAP SERPL CALCULATED.3IONS-SCNC: 11 MMOL/L (ref 4–13)
AST SERPL W P-5'-P-CCNC: 28 U/L (ref 13–39)
BASOPHILS # BLD AUTO: 0.02 THOUSANDS/ÂΜL (ref 0–0.1)
BASOPHILS NFR BLD AUTO: 0 % (ref 0–1)
BILIRUB SERPL-MCNC: 0.4 MG/DL (ref 0.2–1)
BUN SERPL-MCNC: 34 MG/DL (ref 5–25)
CALCIUM SERPL-MCNC: 9.5 MG/DL (ref 8.4–10.2)
CHLORIDE SERPL-SCNC: 103 MMOL/L (ref 96–108)
CO2 SERPL-SCNC: 25 MMOL/L (ref 21–32)
CREAT SERPL-MCNC: 1.11 MG/DL (ref 0.6–1.3)
EOSINOPHIL # BLD AUTO: 0.03 THOUSAND/ÂΜL (ref 0–0.61)
EOSINOPHIL NFR BLD AUTO: 0 % (ref 0–6)
ERYTHROCYTE [DISTWIDTH] IN BLOOD BY AUTOMATED COUNT: 12.1 % (ref 11.6–15.1)
GFR SERPL CREATININE-BSD FRML MDRD: 61 ML/MIN/1.73SQ M
GLUCOSE SERPL-MCNC: 110 MG/DL (ref 65–140)
HCT VFR BLD AUTO: 39.6 % (ref 36.5–49.3)
HGB BLD-MCNC: 13.2 G/DL (ref 12–17)
IMM GRANULOCYTES # BLD AUTO: 0.02 THOUSAND/UL (ref 0–0.2)
IMM GRANULOCYTES NFR BLD AUTO: 0 % (ref 0–2)
LIPASE SERPL-CCNC: 16 U/L (ref 11–82)
LYMPHOCYTES # BLD AUTO: 0.83 THOUSANDS/ÂΜL (ref 0.6–4.47)
LYMPHOCYTES NFR BLD AUTO: 10 % (ref 14–44)
MCH RBC QN AUTO: 33.1 PG (ref 26.8–34.3)
MCHC RBC AUTO-ENTMCNC: 33.3 G/DL (ref 31.4–37.4)
MCV RBC AUTO: 99 FL (ref 82–98)
MONOCYTES # BLD AUTO: 0.67 THOUSAND/ÂΜL (ref 0.17–1.22)
MONOCYTES NFR BLD AUTO: 8 % (ref 4–12)
NEUTROPHILS # BLD AUTO: 6.8 THOUSANDS/ÂΜL (ref 1.85–7.62)
NEUTS SEG NFR BLD AUTO: 82 % (ref 43–75)
NRBC BLD AUTO-RTO: 0 /100 WBCS
PLATELET # BLD AUTO: 258 THOUSANDS/UL (ref 149–390)
PMV BLD AUTO: 9 FL (ref 8.9–12.7)
POTASSIUM SERPL-SCNC: 4.2 MMOL/L (ref 3.5–5.3)
PROT SERPL-MCNC: 6.8 G/DL (ref 6.4–8.4)
RBC # BLD AUTO: 3.99 MILLION/UL (ref 3.88–5.62)
SODIUM SERPL-SCNC: 139 MMOL/L (ref 135–147)
TSH SERPL DL<=0.05 MIU/L-ACNC: 3.86 UIU/ML (ref 0.45–4.5)
WBC # BLD AUTO: 8.37 THOUSAND/UL (ref 4.31–10.16)

## 2024-08-03 PROCEDURE — 83690 ASSAY OF LIPASE: CPT

## 2024-08-03 PROCEDURE — 74177 CT ABD & PELVIS W/CONTRAST: CPT

## 2024-08-03 PROCEDURE — 71260 CT THORAX DX C+: CPT

## 2024-08-03 PROCEDURE — 80053 COMPREHEN METABOLIC PANEL: CPT

## 2024-08-03 PROCEDURE — 96360 HYDRATION IV INFUSION INIT: CPT

## 2024-08-03 PROCEDURE — 85025 COMPLETE CBC W/AUTO DIFF WBC: CPT

## 2024-08-03 PROCEDURE — NC001 PR NO CHARGE: Performed by: EMERGENCY MEDICINE

## 2024-08-03 PROCEDURE — 36415 COLL VENOUS BLD VENIPUNCTURE: CPT

## 2024-08-03 PROCEDURE — 84443 ASSAY THYROID STIM HORMONE: CPT

## 2024-08-03 RX ORDER — DOCUSATE SODIUM 250 MG
250 CAPSULE ORAL DAILY
Qty: 10 CAPSULE | Refills: 0 | Status: SHIPPED | OUTPATIENT
Start: 2024-08-03

## 2024-08-03 RX ORDER — POLYETHYLENE GLYCOL 3350 17 G/17G
17 POWDER, FOR SOLUTION ORAL DAILY
Qty: 20 EACH | Refills: 0 | Status: SHIPPED | OUTPATIENT
Start: 2024-08-03

## 2024-08-03 RX ADMIN — IOHEXOL 100 ML: 350 INJECTION, SOLUTION INTRAVENOUS at 00:54

## 2024-08-03 RX ADMIN — SODIUM CHLORIDE 500 ML: 0.9 INJECTION, SOLUTION INTRAVENOUS at 00:00

## 2024-08-03 NOTE — ED NOTES
Patient came to nurses station asking if he could have a phone to call his wife. Portable phone was given and he called his wife cell and home phone.     Amber Ray RN  08/03/24 0621

## 2024-08-03 NOTE — ED PROVIDER NOTES
Emergency Department Sign Out Note        Sign out and transfer of care from Kenia Chung PA-C. See Separate Emergency Department note.     The patient, Champ Nicole, was evaluated by the previous provider for constipation.    Workup Completed:  Labs Reviewed   CBC AND DIFFERENTIAL - Abnormal       Result Value Ref Range Status    WBC 8.37  4.31 - 10.16 Thousand/uL Final    RBC 3.99  3.88 - 5.62 Million/uL Final    Hemoglobin 13.2  12.0 - 17.0 g/dL Final    Hematocrit 39.6  36.5 - 49.3 % Final    MCV 99 (*) 82 - 98 fL Final    MCH 33.1  26.8 - 34.3 pg Final    MCHC 33.3  31.4 - 37.4 g/dL Final    RDW 12.1  11.6 - 15.1 % Final    MPV 9.0  8.9 - 12.7 fL Final    Platelets 258  149 - 390 Thousands/uL Final    nRBC 0  /100 WBCs Final    Segmented % 82 (*) 43 - 75 % Final    Immature Grans % 0  0 - 2 % Final    Lymphocytes % 10 (*) 14 - 44 % Final    Monocytes % 8  4 - 12 % Final    Eosinophils Relative 0  0 - 6 % Final    Basophils Relative 0  0 - 1 % Final    Absolute Neutrophils 6.80  1.85 - 7.62 Thousands/µL Final    Absolute Immature Grans 0.02  0.00 - 0.20 Thousand/uL Final    Absolute Lymphocytes 0.83  0.60 - 4.47 Thousands/µL Final    Absolute Monocytes 0.67  0.17 - 1.22 Thousand/µL Final    Eosinophils Absolute 0.03  0.00 - 0.61 Thousand/µL Final    Basophils Absolute 0.02  0.00 - 0.10 Thousands/µL Final   COMPREHENSIVE METABOLIC PANEL - Abnormal    Sodium 139  135 - 147 mmol/L Final    Potassium 4.2  3.5 - 5.3 mmol/L Final    Chloride 103  96 - 108 mmol/L Final    CO2 25  21 - 32 mmol/L Final    ANION GAP 11  4 - 13 mmol/L Final    BUN 34 (*) 5 - 25 mg/dL Final    Creatinine 1.11  0.60 - 1.30 mg/dL Final    Comment: Standardized to IDMS reference method    Glucose 110  65 - 140 mg/dL Final    Comment: If the patient is fasting, the ADA then defines impaired fasting glucose as > 100 mg/dL and diabetes as > or equal to 123 mg/dL.    Calcium 9.5  8.4 - 10.2 mg/dL Final    AST 28  13 - 39 U/L Final     ALT 34  7 - 52 U/L Final    Comment: Specimen collection should occur prior to Sulfasalazine administration due to the potential for falsely depressed results.     Alkaline Phosphatase 55  34 - 104 U/L Final    Total Protein 6.8  6.4 - 8.4 g/dL Final    Albumin 4.2  3.5 - 5.0 g/dL Final    Total Bilirubin 0.40  0.20 - 1.00 mg/dL Final    Comment: Use of this assay is not recommended for patients undergoing treatment with eltrombopag due to the potential for falsely elevated results.  N-acetyl-p-benzoquinone imine (metabolite of Acetaminophen) will generate erroneously low results in samples for patients that have taken an overdose of Acetaminophen.    eGFR 61  ml/min/1.73sq m Final    Narrative:     National Kidney Disease Foundation guidelines for Chronic Kidney Disease (CKD):     Stage 1 with normal or high GFR (GFR > 90 mL/min/1.73 square meters)    Stage 2 Mild CKD (GFR = 60-89 mL/min/1.73 square meters)    Stage 3A Moderate CKD (GFR = 45-59 mL/min/1.73 square meters)    Stage 3B Moderate CKD (GFR = 30-44 mL/min/1.73 square meters)    Stage 4 Severe CKD (GFR = 15-29 mL/min/1.73 square meters)    Stage 5 End Stage CKD (GFR <15 mL/min/1.73 square meters)  Note: GFR calculation is accurate only with a steady state creatinine   LIPASE - Normal    Lipase 16  11 - 82 u/L Final   TSH, 3RD GENERATION WITH FREE T4 REFLEX - Normal    TSH 3RD GENERATON 3.861  0.450 - 4.500 uIU/mL Final    Comment: Adult TSH (3rd generation) reference range follows the recommended guidelines of the American Thyroid Association, January, 2020.     CT chest abdomen pelvis w contrast   Final Result      No acute findings in the chest, abdomen or pelvis.      No displaced fracture identified.      Significant fecal retention with a large amount of stool within the rectal vault. Recommend clinical correlation for fecal impaction.      Soft tissue density lesion protruding into the bladder base likely reflects enlarged prostate gland although  this region is partially obscured by adjacent streak artifact. Underlying malignancy not excluded. Clinical correlation and urologic follow-up    advised         Workstation performed: [id number]               ED Course / Workup Pending (followup):  -At time of signout, pending reevaluation following milk and molasses enema for treatment of fecal retention.  If clinically improving can likely discharge home with a stool softener.  Also needs urology follow-up at some point for enlarged prostate noted on CT scan.    -Following enema patient had a large bowel movement.  He admits to feeling well and appears well on reevaluation.  Will discharge home.  After several attempts was able to reach patient's wife, Gina.  She will try to get their son to come  the patient as she does not drive at night.              ED Course as of 08/03/24 0451   Sat Aug 03, 2024   0147 S/O EGW: Fecal impaction noted on CT scan, will start a milk and molasses enema.  Patient also had 500 cc IV fluids, did have a small bowel movement while in the ER.  If having some improvement following enema can likely discharge on stool softeners and MiraLAX.  Notable enlarged prostate on CT, can follow-up with urology.   0310 Patient had a substantial bowel movement following his enema.  On reevaluation he appears to be feeling well.  Fecal impaction was adequately addressed and patient will be discharged home.  Patient is aware and happy to be going home.  Attempted to call patient's wife Gina, no answer, LMOM x 1 to call back to discuss pickup.     Procedures  Medical Decision Making  Amount and/or Complexity of Data Reviewed  Labs: ordered.  Radiology: ordered.    Risk  Prescription drug management.            Disposition  Final diagnoses:   Fecal impaction in rectum (HCC)   Enlarged prostate     Time reflects when diagnosis was documented in both MDM as applicable and the Disposition within this note       Time User Action Codes Description  Comment    8/3/2024  1:46 AM Kenia Chung [K56.41] Fecal impaction in rectum (HCC)     8/3/2024  1:46 AM Kenia Chung [N40.0] Enlarged prostate           ED Disposition       ED Disposition   Discharge    Condition   Stable    Date/Time   Sat Aug 3, 2024  4:24 AM    Comment   Champ Nicole discharge to home/self care.                   Follow-up Information       Follow up With Specialties Details Why Contact Info    Sanya Freeman MD Family Medicine   [address] [place] [id number] [phone]            Patient's Medications   Discharge Prescriptions    DOCUSATE SODIUM (COLACE) 250 MG CAPSULE    Take 1 capsule (250 mg total) by mouth daily       Start Date: 8/3/2024  End Date: --       Order Dose: 250 mg       Quantity: 10 capsule    Refills: 0    POLYETHYLENE GLYCOL (MIRALAX) 17 G PACKET    Take 17 g by mouth daily       Start Date: 8/3/2024  End Date: --       Order Dose: 17 g       Quantity: 20 each    Refills: 0     No discharge procedures on file.       ED Provider  Electronically Signed by     Chico Arango PA-C  08/03/24 0451

## 2024-08-03 NOTE — ED PROVIDER NOTES
"History  Chief Complaint   Patient presents with    Medical Problem     Per EMS, pt's wife reported pt was constipated and had a fall two days ago. They also reported pt with hx of dementia. Pt a/o x4. Pt denies all complaints and does not recall falling. No family at bedside     Nathan is an 79-year-old male with history of dimension, colon polyps, BPH presenting to the emergency department for painful bowel movements.  He was brought in by EMS, his wife called an ambulance because he was having painful bowel movements and he \"would not let her help him\".  States that he had also fallen 2 days ago from the couch and bumped his left side on the ottoman.  This was a witnessed fall without head strike.  Patient reports that his last bowel movement was yesterday.  He states that \"not much comes out\".  But denies presence of blood, urinary symptoms.      Medical Problem  Associated symptoms: abdominal pain    Associated symptoms: no chest pain, no cough, no diarrhea, no fever, no nausea, no shortness of breath, no sore throat and no vomiting        Prior to Admission Medications   Prescriptions Last Dose Informant Patient Reported? Taking?   Acetaminophen (TYLENOL PO)  Spouse/Significant Other, Self Yes No   Sig: Take by mouth as needed   Dutasteride-Tamsulosin HCl 0.5-0.4 MG CAPS   No No   Sig: Take 1 capsule by mouth daily   Iron Combinations (IRON COMPLEX PO)   Yes No   Sig: Take 25 mg by mouth daily   Multiple Vitamins-Minerals (CENTRUM SILVER 50+MEN PO)   Yes No   amLODIPine (NORVASC) 10 mg tablet   No No   Sig: Take 1 tablet (10 mg total) by mouth daily   ascorbic acid (VITAMIN C) 500 MG tablet   Yes No   Sig: Take 500 mg by mouth daily   aspirin 81 mg chewable tablet   Yes No   Sig: Chew 81 mg daily   atorvastatin (LIPITOR) 80 mg tablet   No No   Sig: Take 1 tablet (80 mg total) by mouth daily   butalbital-acetaminophen-caffeine (FIORICET,ESGIC) -40 mg per tablet   No No   Sig: Take 1 tablet by mouth daily " as needed for headaches   cephalexin (KEFLEX) 500 mg capsule   Yes No   Sig: Take 2,000 mg by mouth 60 minutes pre-procedure Prior to dental procedures   cholecalciferol (VITAMIN D3) 1,000 units tablet   Yes No   levothyroxine 75 mcg tablet  Spouse/Significant Other, Self No No   Sig: Take 1 tablet (75 mcg total) by mouth daily   lisinopril-hydrochlorothiazide (PRINZIDE,ZESTORETIC) 20-25 MG per tablet   No No   Sig: TAKE 1 TABLET DAILY   niacin 500 mg tablet  Self, Spouse/Significant Other Yes No   Sig: Take 500 mg by mouth daily with breakfast   sertraline (ZOLOFT) 50 mg tablet   No No   Sig: Take 1 tablet (50 mg total) by mouth daily   traZODone (DESYREL) 50 mg tablet   No No   Sig: Take 1 tablet (50 mg total) by mouth daily at bedtime   vitamin E, tocopherol, 400 units capsule   Yes No   Sig: Take 400 Units by mouth daily      Facility-Administered Medications: None       Past Medical History:   Diagnosis Date    Abnormal weight loss     Anxiety     BPH (benign prostatic hyperplasia)     Bursitis of left hip     Cervical radiculopathy     Colon polyps     Dementia (HCC)     Depression     Disease of thyroid gland     Fall 06/10/2023    Heart murmur     Hypertension     Nicotine dependence in remission     Non-toxic multinodular goiter     Psychiatric disorder        Past Surgical History:   Procedure Laterality Date    CATARACT EXTRACTION Left     Dr. Macedo; onset: 8/6/13    COLONOSCOPY      3/20/13, normal clon - Dr. Gan, repeat in 5-10 years    HIP SURGERY      JOINT REPLACEMENT      [place] COLONOSCOPY FLX DX W/COLLJ SPEC WHEN PFRMD N/A 03/28/2018    Procedure: COLONOSCOPY;  Surgeon: CIARRA Gan MD;  Location: [facility];  Service: Colorectal    THYROIDECTOMY, PARTIAL      TONSILLECTOMY      TOTAL HIP ARTHROPLASTY         Family History   Problem Relation Age of Onset    Diabetes Father     Lung cancer Father     Prostate cancer Father     Cancer Father     Alcohol abuse Paternal Uncle     Heart  disease Paternal Uncle      I have reviewed and agree with the history as documented.    E-Cigarette/Vaping    E-Cigarette Use Never User      E-Cigarette/Vaping Substances     Social History     Tobacco Use    Smoking status: Former     Current packs/day: 0.00     Average packs/day: 0.5 packs/day for 26.5 years (13.3 ttl pk-yrs)     Types: Cigarettes     Start date: 1959     Quit date: 1985     Years since quittin.9     Passive exposure: Never    Smokeless tobacco: Never   Vaping Use    Vaping status: Never Used   Substance Use Topics    Alcohol use: Not Currently     Alcohol/week: 7.0 standard drinks of alcohol     Types: 7 Glasses of wine per week     Comment: 12 oz every night    Drug use: No       Review of Systems   Constitutional:  Negative for chills and fever.   HENT:  Negative for sore throat.    Respiratory:  Negative for cough and shortness of breath.    Cardiovascular:  Negative for chest pain and palpitations.   Gastrointestinal:  Positive for abdominal pain, constipation and rectal pain. Negative for abdominal distention, anal bleeding, blood in stool, diarrhea, nausea and vomiting.   Genitourinary:  Negative for dysuria, frequency, hematuria and urgency.   Musculoskeletal:  Negative for arthralgias and back pain.   Neurological:  Negative for syncope and weakness.   All other systems reviewed and are negative.      Physical Exam  Physical Exam  Vitals and nursing note reviewed.   Constitutional:       General: He is not in acute distress.     Appearance: He is well-developed.   HENT:      Head: Normocephalic and atraumatic.   Eyes:      Conjunctiva/sclera: Conjunctivae normal.   Cardiovascular:      Rate and Rhythm: Normal rate and regular rhythm.      Heart sounds: No murmur heard.  Pulmonary:      Effort: Pulmonary effort is normal. No respiratory distress.      Breath sounds: Normal breath sounds.   Abdominal:      General: Bowel sounds are normal. There is distension.       Palpations: Abdomen is soft.      Tenderness: There is no abdominal tenderness. There is no right CVA tenderness, left CVA tenderness or guarding.   Musculoskeletal:         General: No swelling, tenderness or deformity.      Cervical back: Neck supple.   Skin:     General: Skin is warm and dry.      Capillary Refill: Capillary refill takes less than 2 seconds.   Neurological:      General: No focal deficit present.      Mental Status: He is alert and oriented to person, place, and time.   Psychiatric:         Mood and Affect: Mood normal.         Vital Signs  ED Triage Vitals [08/02/24 2213]   Temperature Pulse Respirations Blood Pressure SpO2   98.2 °F (36.8 °C) 75 20 155/70 97 %      Temp Source Heart Rate Source Patient Position - Orthostatic VS BP Location FiO2 (%)   Oral Monitor Lying Right arm --      Pain Score       --           Vitals:    08/02/24 2213 08/03/24 0239   BP: 155/70 155/70   Pulse: 75 75   Patient Position - Orthostatic VS: Lying Lying         Visual Acuity      ED Medications  Medications   sodium chloride 0.9 % bolus 500 mL (0 mL Intravenous Stopped 8/3/24 0112)   iohexol (OMNIPAQUE) 350 MG/ML injection (MULTI-DOSE) 100 mL (100 mL Intravenous Given 8/3/24 0054)       Diagnostic Studies  Results Reviewed       Procedure Component Value Units Date/Time    TSH, 3rd generation with Free T4 reflex [[id number]]  (Normal) Collected: 08/03/24 0000    Lab Status: Final result Specimen: Blood from Arm, Right Updated: 08/03/24 0037     TSH 3RD GENERATON 3.861 uIU/mL     Comprehensive metabolic panel [[id number]]  (Abnormal) Collected: 08/03/24 0000    Lab Status: Final result Specimen: Blood from Arm, Right Updated: 08/03/24 0028     Sodium 139 mmol/L      Potassium 4.2 mmol/L      Chloride 103 mmol/L      CO2 25 mmol/L      ANION GAP 11 mmol/L      BUN 34 mg/dL      Creatinine 1.11 mg/dL      Glucose 110 mg/dL      Calcium 9.5 mg/dL      AST 28 U/L      ALT 34 U/L      Alkaline Phosphatase 55 U/L       Total Protein 6.8 g/dL      Albumin 4.2 g/dL      Total Bilirubin 0.40 mg/dL      eGFR 61 ml/min/1.73sq m     Narrative:      National Kidney Disease Foundation guidelines for Chronic Kidney Disease (CKD):     Stage 1 with normal or high GFR (GFR > 90 mL/min/1.73 square meters)    Stage 2 Mild CKD (GFR = 60-89 mL/min/1.73 square meters)    Stage 3A Moderate CKD (GFR = 45-59 mL/min/1.73 square meters)    Stage 3B Moderate CKD (GFR = 30-44 mL/min/1.73 square meters)    Stage 4 Severe CKD (GFR = 15-29 mL/min/1.73 square meters)    Stage 5 End Stage CKD (GFR <15 mL/min/1.73 square meters)  Note: GFR calculation is accurate only with a steady state creatinine    Lipase [[id number]]  (Normal) Collected: 08/03/24 0000    Lab Status: Final result Specimen: Blood from Arm, Right Updated: 08/03/24 0028     Lipase 16 u/L     CBC and differential [[id number]]  (Abnormal) Collected: 08/03/24 0000    Lab Status: Final result Specimen: Blood from Arm, Right Updated: 08/03/24 0016     WBC 8.37 Thousand/uL      RBC 3.99 Million/uL      Hemoglobin 13.2 g/dL      Hematocrit 39.6 %      MCV 99 fL      MCH 33.1 pg      MCHC 33.3 g/dL      RDW 12.1 %      MPV 9.0 fL      Platelets 258 Thousands/uL      nRBC 0 /100 WBCs      Segmented % 82 %      Immature Grans % 0 %      Lymphocytes % 10 %      Monocytes % 8 %      Eosinophils Relative 0 %      Basophils Relative 0 %      Absolute Neutrophils 6.80 Thousands/µL      Absolute Immature Grans 0.02 Thousand/uL      Absolute Lymphocytes 0.83 Thousands/µL      Absolute Monocytes 0.67 Thousand/µL      Eosinophils Absolute 0.03 Thousand/µL      Basophils Absolute 0.02 Thousands/µL                    CT chest abdomen pelvis w contrast   Final Result by Dwight Herrera MD (08/03 0136)      No acute findings in the chest, abdomen or pelvis.      No displaced fracture identified.      Significant fecal retention with a large amount of stool within the rectal vault. Recommend clinical correlation for  fecal impaction.      Soft tissue density lesion protruding into the bladder base likely reflects enlarged prostate gland although this region is partially obscured by adjacent streak artifact. Underlying malignancy not excluded. Clinical correlation and urologic follow-up    advised         Workstation performed: [id number]                    Procedures  Procedures         ED Course                                 SBIRT 22yo+      Flowsheet Row Most Recent Value   Initial Alcohol Screen: US AUDIT-C     1. How often do you have a drink containing alcohol? 0 Filed at: 08/02/2024 2213   2. How many drinks containing alcohol do you have on a typical day you are drinking?  0 Filed at: 08/02/2024 2213   3a. Male UNDER 65: How often do you have five or more drinks on one occasion? 0 Filed at: 08/02/2024 2213   3b. FEMALE Any Age, or MALE 65+: How often do you have 4 or more drinks on one occassion? 0 Filed at: 08/02/2024 2213   Audit-C Score 0 Filed at: 08/02/2024 2213   JIMENEZ: How many times in the past year have you...    Used an illegal drug or used a prescription medication for non-medical reasons? Never Filed at: 08/02/2024 2213                      Medical Decision Making  DDx includes obstruction, urinary retention, BPH, fecal impaction.  Spoke to patient's wife who is agreeable to workup and treatment.  Patient himself is also agreeable.  CBC ordered to rule out infection, anemia.  CMP ordered to rule out electrolyte abnormality, kidney injury, liver injury.  Lipase ordered to rule out pancreatitis.  CT showed prostate enlargement and large rectal fecal impaction.  Results discussed with patient.  Signed out to Chico Arango PA-C pending improvement after enema.  Signed out in stable condition.    Amount and/or Complexity of Data Reviewed  Labs: ordered.  Radiology: ordered.    Risk  Prescription drug management.                 Disposition  Final diagnoses:   Fecal impaction in rectum (HCC)   Enlarged prostate      Time reflects when diagnosis was documented in both MDM as applicable and the Disposition within this note       Time User Action Codes Description Comment    8/3/2024  1:46 AM Kenia Chung [K56.41] Fecal impaction in rectum (HCC)     8/3/2024  1:46 AM Kenia Chung [N40.0] Enlarged prostate           ED Disposition       None          Follow-up Information       Follow up With Specialties Details Why Contact Info    Sanya Freeman MD Family Medicine   [address] [place] [id number] [phone]              Patient's Medications   Discharge Prescriptions    DOCUSATE SODIUM (COLACE) 250 MG CAPSULE    Take 1 capsule (250 mg total) by mouth daily       Start Date: 8/3/2024  End Date: --       Order Dose: 250 mg       Quantity: 10 capsule    Refills: 0    POLYETHYLENE GLYCOL (MIRALAX) 17 G PACKET    Take 17 g by mouth daily       Start Date: 8/3/2024  End Date: --       Order Dose: 17 g       Quantity: 20 each    Refills: 0       No discharge procedures on file.    PDMP Review         Value Time User    PDMP Reviewed  Yes 7/24/2024  2:21 PM Sanya Freeman MD            ED Provider  Electronically Signed by             Kenia Chung PA-C  08/03/24 0322

## 2024-08-03 NOTE — ED NOTES
Patient came to nurses station asking to call family members again. Patient reminded that last time we spoke to his wife she didn't feel comfortable driving in the dark and would try to get a hold of their son. Patient verbalized understanding and went back into his room. Provider made aware.     Amber Ray RN  08/03/24 0556

## 2024-08-03 NOTE — DISCHARGE INSTRUCTIONS
Begin using MiraLAX and Colace for regular bowel movements.  Increase fluids and fiber intake. Follow-up with primary care.    Follow-up with your urologist regarding incidental findings of enlarged prostate.

## 2024-08-05 ENCOUNTER — NURSE TRIAGE (OUTPATIENT)
Age: 79
End: 2024-08-05

## 2024-08-05 ENCOUNTER — VBI (OUTPATIENT)
Dept: FAMILY MEDICINE CLINIC | Facility: CLINIC | Age: 79
End: 2024-08-05

## 2024-08-05 DIAGNOSIS — J40 BRONCHITIS: Primary | ICD-10-CM

## 2024-08-05 RX ORDER — AZITHROMYCIN 250 MG/1
TABLET, FILM COATED ORAL
Qty: 6 TABLET | Refills: 0 | Status: SHIPPED | OUTPATIENT
Start: 2024-08-05 | End: 2024-08-09

## 2024-08-05 NOTE — TELEPHONE ENCOUNTER
08/05/24 11:48 AM    Patient contacted post ED visit, [facility] department spoke with patient/caregiver and outreach was successful.    Thank you.  Sakshi Villagomez MA  [facility]

## 2024-08-05 NOTE — TELEPHONE ENCOUNTER
Regarding: cough with production  ----- Message from Danae COLORADO sent at 8/5/2024 10:19 AM EDT -----  Patients spouse called in stating that her and her  are in the process of cleaning out their house to move into [facility]. Patients spouse states they have been stirring up a lot of dust and have been feeling sick because of it. Their son was just seen at the doctors and was told his symptoms are most likely from stirring up a lot of dust and was given antibiotics. Patients symptoms are similar which include cough with yellow/green production, congestion, loss of voice, sore throat, and low grade fever. Patients spouse is requesting antibiotics to be sent to the [facility] on [facility]. Please review.

## 2024-08-16 DIAGNOSIS — G89.29 CHRONIC INTRACTABLE HEADACHE, UNSPECIFIED HEADACHE TYPE: ICD-10-CM

## 2024-08-16 DIAGNOSIS — R51.9 CHRONIC INTRACTABLE HEADACHE, UNSPECIFIED HEADACHE TYPE: ICD-10-CM

## 2024-08-16 NOTE — TELEPHONE ENCOUNTER
Reason for call:   [x] Refill   [] Prior Auth  [] Other:     Office:   [x] PCP/Provider - [place]  [] Specialty/Provider -     Medication: butalbital-acetaminophen-caffeine (FIORICET,ESGIC) -40 mg per tablet     Dose/Frequency: Take 1 tablet by mouth daily as needed for headaches     Quantity: 100    [facility]    Does the patient have enough for 3 days?   [] Yes   [x] No - Send as HP to POD  Wife states he is out of the medication

## 2024-08-19 RX ORDER — BUTALBITAL, ACETAMINOPHEN AND CAFFEINE 50; 325; 40 MG/1; MG/1; MG/1
1 TABLET ORAL DAILY PRN
Qty: 100 TABLET | Refills: 0 | Status: SHIPPED | OUTPATIENT
Start: 2024-08-19

## 2024-10-07 DIAGNOSIS — I73.9 PAD (PERIPHERAL ARTERY DISEASE) (HCC): ICD-10-CM

## 2024-10-09 RX ORDER — ATORVASTATIN CALCIUM 80 MG/1
80 TABLET, FILM COATED ORAL DAILY
Qty: 90 TABLET | Refills: 1 | Status: SHIPPED | OUTPATIENT
Start: 2024-10-09

## 2024-10-15 DIAGNOSIS — N40.0 BENIGN PROSTATIC HYPERPLASIA, UNSPECIFIED WHETHER LOWER URINARY TRACT SYMPTOMS PRESENT: ICD-10-CM

## 2024-10-15 DIAGNOSIS — F41.9 ANXIETY: ICD-10-CM

## 2024-10-15 NOTE — TELEPHONE ENCOUNTER
Reason for call:   [x] Refill   [] Prior Auth  [] Other:     Office:   [x] PCP/Provider - [facility]  [] Specialty/Provider -     Dutasteride-Tamsulosin HCl 0.5-0.4 MG CAPS  Take 1 capsule by mouth daily      sertraline (ZOLOFT) 50 mg tablet Take 1 tablet (50 mg total) by mouth daily     [facility]: [facility] - [address]     Does the patient have enough for 3 days?   [x] Yes   [] No - Send as HP to POD

## 2024-10-16 RX ORDER — DUTASTERIDE AND TAMSULOSIN HYDROCHLORIDE CAPSULES .5; .4 MG/1; MG/1
1 CAPSULE ORAL DAILY
Qty: 90 CAPSULE | Refills: 1 | Status: SHIPPED | OUTPATIENT
Start: 2024-10-16

## 2024-10-21 ENCOUNTER — RA CDI HCC (OUTPATIENT)
Dept: OTHER | Facility: HOSPITAL | Age: 79
End: 2024-10-21

## 2024-10-25 ENCOUNTER — OFFICE VISIT (OUTPATIENT)
Dept: FAMILY MEDICINE CLINIC | Facility: CLINIC | Age: 79
End: 2024-10-25
Payer: COMMERCIAL

## 2024-10-25 VITALS
DIASTOLIC BLOOD PRESSURE: 78 MMHG | SYSTOLIC BLOOD PRESSURE: 120 MMHG | RESPIRATION RATE: 18 BRPM | WEIGHT: 162.6 LBS | HEART RATE: 67 BPM | OXYGEN SATURATION: 96 % | HEIGHT: 70 IN | TEMPERATURE: 96.8 F | BODY MASS INDEX: 23.28 KG/M2

## 2024-10-25 DIAGNOSIS — F41.9 ANXIETY AND DEPRESSION: ICD-10-CM

## 2024-10-25 DIAGNOSIS — F32.A ANXIETY AND DEPRESSION: ICD-10-CM

## 2024-10-25 DIAGNOSIS — F03.A11 MILD DEMENTIA WITH AGITATION, UNSPECIFIED DEMENTIA TYPE (HCC): Primary | ICD-10-CM

## 2024-10-25 DIAGNOSIS — I10 ESSENTIAL HYPERTENSION: ICD-10-CM

## 2024-10-25 PROCEDURE — G2211 COMPLEX E/M VISIT ADD ON: HCPCS | Performed by: FAMILY MEDICINE

## 2024-10-25 PROCEDURE — 99214 OFFICE O/P EST MOD 30 MIN: CPT | Performed by: FAMILY MEDICINE

## 2024-10-25 RX ORDER — CITALOPRAM HYDROBROMIDE 20 MG/1
20 TABLET ORAL DAILY
Qty: 90 TABLET | Refills: 1 | Status: SHIPPED | OUTPATIENT
Start: 2024-10-25

## 2024-10-25 RX ORDER — HYDRALAZINE HYDROCHLORIDE 10 MG/1
10 TABLET, FILM COATED ORAL 3 TIMES DAILY
Qty: 270 TABLET | Refills: 1 | Status: SHIPPED | OUTPATIENT
Start: 2024-10-25

## 2024-10-25 NOTE — PROGRESS NOTES
Ambulatory Visit  Name: Champ Nicole      : 1945      MRN: [id number]  Encounter Provider: Sanya Freeman MD  Encounter Date: 10/25/2024   Encounter department: [facility]    Assessment & Plan  Mild dementia with agitation, unspecified dementia type (HCC)  Advised pt discuss with geriatrics.        Essential hypertension  Stop amlodipine because of constipation.   Give hydralazine 10mg tid.   Continue prinzide 20-25mg QD.   Orders:    hydrALAZINE (APRESOLINE) 10 mg tablet; Take 1 tablet (10 mg total) by mouth 3 (three) times a day    Anxiety and depression  Stop zoloft 50mg QD. Give citalopram 20mg QD.     Orders:    citalopram (CeleXA) 20 mg tablet; Take 1 tablet (20 mg total) by mouth daily         History of Present Illness     HPI    Pt is here with his wife.      Mild dementia---He is off Aricept because side effects. FU geriatrics.  He lives in [facility] living with wife now.   He gets loud and screaming almost everyday per wife.   Pt states wife is annoy.     HTN---He is on lisinopril-HCTZ 20-25mg QD and amlodipine 10mg QD. He has constipation and clogged toilet several times.     Anxiety/OCD---He stopped prozac 80mg QD. He is on zoloft 50mg daily for 1 year, no much difference per wife.      Insomnia---he is on trazodone 50mg qhs which helped.      DM---Diet control. 2024 HgA1C 6.0  PAD---FU vascular surgeon. He is on statin and ASA 81mg QD.       Hyperlipidemia---He is on atorvastatin 80mg qhs. Denies SE.      Hypothyroidism---He is on levothyroxine 75mcg daily. Feels fine.      AS---FU cardiology. 2023 Echo done.       Tension headache---use fioricet as needed, not use it everyday. FU neurology yearly.       BPH---FU urology Dr. Alvarado for BPH yearly. He is on Lolly which helped.         Review of Systems   Constitutional:  Negative for appetite change, chills and fever.   HENT:  Negative for congestion, ear pain, sinus pain and sore throat.    Eyes:   "Negative for discharge and itching.   Respiratory:  Negative for apnea, cough, chest tightness, shortness of breath and wheezing.    Cardiovascular:  Negative for chest pain, palpitations and leg swelling.   Gastrointestinal:  Negative for abdominal pain, anal bleeding, constipation, diarrhea, nausea and vomiting.   Endocrine: Negative for cold intolerance, heat intolerance and polyuria.   Genitourinary:  Negative for difficulty urinating and dysuria.   Musculoskeletal:  Negative for arthralgias, back pain and myalgias.   Skin:  Negative for rash.   Neurological:  Negative for dizziness and headaches.   Psychiatric/Behavioral:  Negative for agitation.            Objective     /78   Pulse 67   Temp (!) 96.8 °F (36 °C) (Tympanic)   Resp 18   Ht 5' 10.28\" (1.785 m)   Wt 73.8 kg (162 lb 9.6 oz)   SpO2 96%   BMI 23.15 kg/m²     Physical Exam  Constitutional:       Appearance: He is well-developed.   HENT:      Head: Normocephalic and atraumatic.   Eyes:      General:         Right eye: No discharge.         Left eye: No discharge.      Conjunctiva/sclera: Conjunctivae normal.   Cardiovascular:      Rate and Rhythm: Normal rate and regular rhythm.      Heart sounds: Normal heart sounds. No murmur heard.     No friction rub. No gallop.   Pulmonary:      Effort: Pulmonary effort is normal. No respiratory distress.      Breath sounds: Normal breath sounds. No wheezing or rales.   Abdominal:      General: Bowel sounds are normal. There is no distension.      Palpations: Abdomen is soft.      Tenderness: There is no abdominal tenderness. There is no guarding.   Musculoskeletal:         General: Normal range of motion.      Cervical back: Normal range of motion and neck supple.      Right lower leg: No edema.      Left lower leg: No edema.   Neurological:      Mental Status: He is alert.   Psychiatric:         Behavior: Behavior normal.         "

## 2024-10-25 NOTE — ASSESSMENT & PLAN NOTE
Stop amlodipine because of constipation.   Give hydralazine 10mg tid.   Continue prinzide 20-25mg QD.   Orders:    hydrALAZINE (APRESOLINE) 10 mg tablet; Take 1 tablet (10 mg total) by mouth 3 (three) times a day

## 2024-11-04 DIAGNOSIS — E03.9 HYPOTHYROIDISM, UNSPECIFIED TYPE: ICD-10-CM

## 2024-11-05 RX ORDER — LEVOTHYROXINE SODIUM 75 UG/1
75 TABLET ORAL DAILY
Qty: 90 TABLET | Refills: 1 | Status: SHIPPED | OUTPATIENT
Start: 2024-11-05

## 2024-11-14 ENCOUNTER — OFFICE VISIT (OUTPATIENT)
Dept: NEUROLOGY | Facility: CLINIC | Age: 79
End: 2024-11-14
Payer: COMMERCIAL

## 2024-11-14 VITALS
HEART RATE: 68 BPM | BODY MASS INDEX: 23.05 KG/M2 | DIASTOLIC BLOOD PRESSURE: 68 MMHG | WEIGHT: 161 LBS | HEIGHT: 70 IN | SYSTOLIC BLOOD PRESSURE: 120 MMHG | OXYGEN SATURATION: 99 %

## 2024-11-14 DIAGNOSIS — F03.A0 MILD DEMENTIA (HCC): Primary | ICD-10-CM

## 2024-11-14 PROCEDURE — 99213 OFFICE O/P EST LOW 20 MIN: CPT | Performed by: PHYSICIAN ASSISTANT

## 2024-11-14 NOTE — PATIENT INSTRUCTIONS
Patient and wife feel things are going well  Now living at [facility]   NO longer driving   Recently started on Citalopram by PCP for mood   Remains on Trazodone for sleep  Continue follow [facility] with [facility]    Patient was encouraged to increase mind stimulating activities such as reading, crosswords, word searches, puzzles, Soduku, solitaire, coloring and other brain games.  We also discussed the importance of staying physically active and eating a health diet such as the Mediterranean or MIND diet.

## 2024-11-14 NOTE — PROGRESS NOTES
Review of Systems   Constitutional:  Negative for appetite change, fatigue and fever.   HENT: Negative.  Negative for hearing loss, tinnitus, trouble swallowing and voice change.    Eyes: Negative.  Negative for photophobia, pain and visual disturbance.   Respiratory: Negative.  Negative for shortness of breath.    Cardiovascular: Negative.  Negative for palpitations.   Gastrointestinal: Negative.  Negative for nausea and vomiting.   Endocrine: Negative.  Negative for cold intolerance.   Genitourinary: Negative.  Negative for dysuria, frequency and urgency.   Musculoskeletal:  Positive for gait problem. Negative for back pain, myalgias, neck pain and neck stiffness.   Skin: Negative.  Negative for rash.   Allergic/Immunologic: Negative.    Neurological:  Negative for dizziness, tremors, seizures, syncope, facial asymmetry, speech difficulty, weakness, light-headedness, numbness and headaches.   Hematological: Negative.  Does not bruise/bleed easily.   Psychiatric/Behavioral:  Positive for confusion. Negative for hallucinations and sleep disturbance.

## 2024-11-14 NOTE — PROGRESS NOTES
Name: Champ Nicole      : 1945      MRN: [id number]  Encounter Provider: Keshia Ordaz PA-C  Encounter Date: 2024   Encounter department: [facility]    :  Assessment & Plan  Mild dementia (HCC)  Patient and wife feel things are going well  Now living at [facility]   NO longer driving   Recently started on Citalopram by PCP for mood   Remains on Trazodone for sleep  [facility] with [facility]    Patient was encouraged to increase mind stimulating activities such as reading, crosswords, word searches, puzzles, Soduku, solitaire, coloring and other brain games.  We also discussed the importance of staying physically active and eating a health diet such as the Mediterranean or MIND diet.                 History of Present Illness   Mr. Champ Nicole is a male with mild dementia, who presents for follow up. To review, wife reports symptoms began gradually in  after residential with slow progression. He was described as having behavioral issues, being racist and possessive. He has short and long term memory impairment. He is on Prozac for OCD and takes Xanax prn. His mother had dementia in her 90's and had OCD. He also has a history of frontal headaches for which he takes Fiorcet about 5 times a week. Wife stopped Aricept in 2023 given she felt his memory was worse.    At his last visit he was doing well, scored 22/30 on MoCA.  No changes made.      INTERVAL HISTORY:  [facility], scheduled with them 24  Had a FTD evaluation performed and scored 99% probability of failing on the road testingWanda notified, told that he can no longer drive   He takes Trazadone for sleep with improvement   He and his wife recently moved into [facility]   He gets all meals   They clean the apartment for them weekly   He is not doing PT and many of the activities   He will forget the day of the week   He needs things to be repeated for him  "  He can perform his ADLs   Wife feels that he is having more issues with mood and anger   Recently started in Citalopram by PCP for mood   No hallucinations      Prior workup:  MRI revealed mild atrophy with early microvascular disease and neuroquant imaging consistent with neurodegenerative process.  Neuropsych testing was consistent with mild cognitive impairment with no clinically significant anxiety or depression.     MoCA 5/20/24 - 21/30 with Geriatrics         Review of Systems  I have personally reviewed the MA's review of systems and made changes as necessary.         Objective   /68 (BP Location: Left arm, Patient Position: Sitting, Cuff Size: Adult)   Pulse 68   Ht 5' 10\" (1.778 m)   Wt 73 kg (161 lb)   SpO2 99%   BMI 23.10 kg/m²     Physical Exam  Constitutional:       Appearance: Normal appearance.   Eyes:      Extraocular Movements: EOM normal.   Pulmonary:      Effort: Pulmonary effort is normal.   Neurological:      Mental Status: He is alert and oriented to person, place, and time.      Gait: Gait is intact.       Neurologic Exam     Mental Status   Oriented to person, place, and time.   Level of consciousness: alert    Cranial Nerves     CN III, IV, VI   Extraocular motions are normal.     CN V   Right facial sensation deficit: none  Left facial sensation deficit: none    CN VII   Right facial weakness: none  Left facial weakness: none    Motor Exam   Overall muscle tone: normal    Sensory Exam   Light touch normal.     Gait, Coordination, and Reflexes     Gait  Gait: normal      Results/Data:    "

## 2024-11-21 ENCOUNTER — OFFICE VISIT (OUTPATIENT)
Age: 79
End: 2024-11-21
Payer: COMMERCIAL

## 2024-11-21 ENCOUNTER — TELEPHONE (OUTPATIENT)
Age: 79
End: 2024-11-21

## 2024-11-21 VITALS
HEART RATE: 60 BPM | HEIGHT: 70 IN | OXYGEN SATURATION: 90 % | BODY MASS INDEX: 23.39 KG/M2 | WEIGHT: 163.4 LBS | SYSTOLIC BLOOD PRESSURE: 128 MMHG | DIASTOLIC BLOOD PRESSURE: 64 MMHG | TEMPERATURE: 97.8 F

## 2024-11-21 DIAGNOSIS — G47.00 INSOMNIA, UNSPECIFIED TYPE: ICD-10-CM

## 2024-11-21 DIAGNOSIS — R26.2 AMBULATORY DYSFUNCTION: ICD-10-CM

## 2024-11-21 DIAGNOSIS — F03.A11 MILD DEMENTIA WITH AGITATION, UNSPECIFIED DEMENTIA TYPE (HCC): Primary | ICD-10-CM

## 2024-11-21 DIAGNOSIS — F41.9 ANXIETY: ICD-10-CM

## 2024-11-21 DIAGNOSIS — M19.90 OSTEOARTHRITIS, UNSPECIFIED OSTEOARTHRITIS TYPE, UNSPECIFIED SITE: ICD-10-CM

## 2024-11-21 PROCEDURE — 99214 OFFICE O/P EST MOD 30 MIN: CPT | Performed by: NURSE PRACTITIONER

## 2024-11-21 PROCEDURE — G2211 COMPLEX E/M VISIT ADD ON: HCPCS | Performed by: NURSE PRACTITIONER

## 2024-11-21 RX ORDER — DONEPEZIL HYDROCHLORIDE 5 MG/1
5 TABLET, FILM COATED ORAL
Qty: 90 TABLET | Refills: 0 | Status: SHIPPED | OUTPATIENT
Start: 2024-11-21 | End: 2024-11-21

## 2024-11-21 RX ORDER — MEMANTINE HYDROCHLORIDE 5 MG/1
5 TABLET ORAL
Qty: 90 TABLET | Refills: 0 | Status: SHIPPED | OUTPATIENT
Start: 2024-11-21

## 2024-11-21 NOTE — TELEPHONE ENCOUNTER
Mya a pharmacist with [facility] called to verify provider is aware of the patient prescribed citalopram 20 mg in October. States they received a prescription today for Donepazil and there is a drug interaction QT prolongating agents.     Please advise    Reference number [id number]

## 2024-11-21 NOTE — TELEPHONE ENCOUNTER
Can you please let Mrs. Nicole know that the pharmacy found an interaction between aricept (the memory pill) and the citalopram that Dr. Freeman just started.  I will discontinue aricept and instead order namenda (other name memantine)  I looked it up and did not see an interaction between the citalopram and namenda.  I will order namenda 5mg at night to start.  We will increase the dose over time, but we will check in at that phone call before we do any increases.

## 2024-11-21 NOTE — PROGRESS NOTES
Assessment & Plan:   Geriatric Evaluation  Memory  MOCA:  20/30.  Deficits in:  language, abstraction, delayed recall, orientation  Cognition update: a little more forgetful  Pt is independent adl/dependent iadls.  Lives with wife, at [facility]  Imaging/labs within last 6 mo:  TSH 8/2024= 3.861  Pt's current cognitive level is consistent with mild dementia  Memory medications: pt/wife would like to try namenda (did attempt aricept, but a drug-drug interaction comes up with citalopram- will trial namenda instead)  Weight change in 6 mo:  up 1/2 pound  Continue to stay active.  Current activity level:  fair.  Participates in some social, cognitive, physical activity.  Safety concerns:  none  Level of care:  independent with supports  Caregiver stress:  mood  Monitor for changes in mood, sleep, pain control.  Notify provider if any concerns  Optimize all acute and chronic conditions.  Continue to follow-up with PCP and specialist as directed for management  Vascular risk factors:  htn, pad, dm2, etoh use, hld  Changes in chronic conditions:  stable  Ensure adequate hydration, good nutrition  Recommended next follow up:  6 months    Mobility  Baseline ambulation: no AD.  Fall type: none recent  PT OT no needs identified  Fall precautions    Mood  Baseline mood:  no complaints per pt, more irritable per wife  GDS 4  Continues on trazodone and citalopram  Encourage relaxation techniques, emotional support     Medication Review  Medications seem appropriate for current conditions  Patient is at risk for increased side effects secondary to polypharmacy  Patient is taking the following medications that meet Beer's Criteria to monitor/avoid:  none  Check with pharmacist/provider before starting any new OTC/prescription medications for potential cognitive side effects    5.  Other Geriatric Syndromes:  Insomnia: Has trouble falling asleep.  Takes trazodone.  Avoid diphenhydramine for sleep aid.  Ensure good sleep  hygiene techniques including limit daytime sleeping, avoid caffeine in the evening, ensure quiet comfortable sleeping area at night.  Chronic OA: Complains of back pain.  Continue as needed Tylenol/topical analgesics.  Avoid NSAIDs.  Notify PCP if increased or change in pain type    HPI:  We had the pleasure of evaluating Champ Nicole who is a 79 y.o. male in Geriatric follow up today.  Previous MOCA:  21/30.  Comorbidities include mild dementia, anxiety/depression, .  She is retired nurse from [employer]  He lives with his wife  Mr. Nicole is in the office with his wife, Gina    Memory update per patient:  He is doing good with memory and with ambulating, no AD, no dizziness.  Appetite stable.  No swallow issues.  Not drinking enough water.  One cup coffee at day.  Sleeps well at night.  He doesn't sleep during the day.  He enjoys watching the tv and out the window    Memory update per patient's wife (d/t memory concerns):  Now at [facility] (August 2024).  He is unhappy because he is not at his own place.  They are nice, but he isn't happy.  He is kind of dragging his feet.  He is cantankerous.  Neuro dismissed him.  Many years ago aricept did not help.  All he will do is sit in his chair.  He tells a lot of things that aren't true. He is losing track of time, asking more often.  He forgets that his home was sold.  He forgets appts.       He has difficulty finding the right word while speaking: No  Patient requires repeat information or ask the same question repeatedly: Yes - mild change per pt    Function update:  Do you do your own bathing indep  Dressing indep  Feeding yourself indep  Tolieting indep  Continence   Transferring indep  Uses : no AD    Can you make your own light meals Yes   Do light cleaning/chores Yes  Do you take care of your own medications No  Do you do your own shopping Yes  Do you handle your own financial affairs such as balancing your checkbook, paying bills, investments: No  Do  you use a cell phone Yes- does do ok, likes to play heart.   Do you drive: No  - he had to give up his car.         Psychosocial update:  Have you or your family noted any change in your mood or personality:Yes - no per pt  Are you currently or have you been treated in the past for depression or anxiety: Yes  Any hallucination or delusion: No  Sleep Issues: Yes - trouble falling asleep  Hearing and vision issue: Yes - glasses, no HAs (mild hearing loss).   ADL/IADL:  indep/dep  Appetite:  good  Pain:  back ache    Family Review of Behavior [facility]:    pacing. No    agressive/combative behavior. No    agitated. No   wandering. No   resistance to care. No   hoarding/hiding objects.    suspicious  No  withdrawn Yes - a marisa  rummaging/pillaging.    misplacing/losing objects - no  personal hygiene problems.No  forgetfulness of actions Yes - per pt = details    ROS: Review of Systems   Constitutional:  Negative for activity change, appetite change, chills and fatigue.   HENT:  Negative for congestion and hearing loss.    Eyes:  Negative for visual disturbance.   Respiratory:  Negative for cough and shortness of breath.    Cardiovascular:  Negative for chest pain.   Gastrointestinal:  Negative for abdominal pain, constipation, diarrhea, nausea and vomiting.   Genitourinary:  Negative for difficulty urinating.   Musculoskeletal:  Positive for back pain and gait problem. Negative for arthralgias.   Neurological:  Negative for dizziness and light-headedness.   Psychiatric/Behavioral:  Positive for decreased concentration (forgetful). Negative for dysphoric mood and sleep disturbance. The patient is not nervous/anxious.        Past Medical, surgical, social, medication and allergy history and patients previous records reviewed.  Allergies:   No Known Allergies    Medications:      Current Outpatient Medications:     Acetaminophen (TYLENOL PO), Take by mouth as needed, Disp: , Rfl:     ascorbic acid (VITAMIN C) 500 MG  tablet, Take 500 mg by mouth daily, Disp: , Rfl:     atorvastatin (LIPITOR) 80 mg tablet, TAKE 1 TABLET DAILY, Disp: 90 tablet, Rfl: 1    butalbital-acetaminophen-caffeine (FIORICET,ESGIC) -40 mg per tablet, Take 1 tablet by mouth daily as needed for headaches, Disp: 100 tablet, Rfl: 0    cephalexin (KEFLEX) 500 mg capsule, Take 2,000 mg by mouth 60 minutes pre-procedure Prior to dental procedures, Disp: , Rfl:     cholecalciferol (VITAMIN D3) 1,000 units tablet, , Disp: , Rfl:     citalopram (CeleXA) 20 mg tablet, Take 1 tablet (20 mg total) by mouth daily, Disp: 90 tablet, Rfl: 1    Dutasteride-Tamsulosin HCl 0.5-0.4 MG CAPS, Take 1 capsule by mouth daily, Disp: 90 capsule, Rfl: 1    hydrALAZINE (APRESOLINE) 10 mg tablet, Take 1 tablet (10 mg total) by mouth 3 (three) times a day, Disp: 270 tablet, Rfl: 1    Iron Combinations (IRON COMPLEX PO), Take 25 mg by mouth daily, Disp: , Rfl:     levothyroxine 75 mcg tablet, TAKE 1 TABLET DAILY, Disp: 90 tablet, Rfl: 1    memantine (NAMENDA) 5 mg tablet, Take 1 tablet (5 mg total) by mouth daily at bedtime, Disp: 90 tablet, Rfl: 0    Multiple Vitamins-Minerals (CENTRUM SILVER 50+MEN PO), , Disp: , Rfl:     niacin 500 mg tablet, Take 500 mg by mouth daily with breakfast, Disp: , Rfl:     traZODone (DESYREL) 50 mg tablet, Take 1 tablet (50 mg total) by mouth daily at bedtime, Disp: 100 tablet, Rfl: 1    vitamin E, tocopherol, 400 units capsule, Take 400 Units by mouth daily, Disp: , Rfl:     aspirin 81 mg chewable tablet, Chew 81 mg daily as needed for headaches (Patient not taking: Reported on 11/21/2024), Disp: , Rfl:     lisinopril-hydrochlorothiazide (PRINZIDE,ZESTORETIC) 20-25 MG per tablet, TAKE 1 TABLET DAILY, Disp: 90 tablet, Rfl: 1    Vitals:  Vitals:    11/21/24 1306   BP: 128/64   Pulse: 60   Temp: 97.8 °F (36.6 °C)   SpO2: 90%       History:  Past Medical History:   Diagnosis Date    Abnormal weight loss     Anxiety     BPH (benign prostatic hyperplasia)      Bursitis of left hip     Cervical radiculopathy     Colon polyps     Dementia (HCC)     Depression     Disease of thyroid gland     Driving safety issue 2024    Fall 06/10/2023    Heart murmur     Hypertension     Nicotine dependence in remission     Non-toxic multinodular goiter     Psychiatric disorder      Past Surgical History:   Procedure Laterality Date    CATARACT EXTRACTION Left     Dr. Macedo; onset: 11    COLONOSCOPY      3/20/13, normal clon - Dr. Gan, repeat in 5-10 years    HIP SURGERY      JOINT REPLACEMENT      SC COLONOSCOPY FLX DX W/COLLJ SPEC WHEN PFRMD N/A 2018    Procedure: COLONOSCOPY;  Surgeon: CIARRA Gan MD;  Location: [facility];  Service: Colorectal    THYROIDECTOMY, PARTIAL      TONSILLECTOMY      TOTAL HIP ARTHROPLASTY       Family History   Problem Relation Age of Onset    Diabetes Father     Lung cancer Father     Prostate cancer Father     Cancer Father     Alcohol abuse Paternal Uncle     Heart disease Paternal Uncle      Social History     Socioeconomic History    Marital status: /Civil Union     Spouse name: Not on file    Number of children: Not on file    Years of education: completed some college    Highest education level: Not on file   Occupational History    Not on file   Tobacco Use    Smoking status: Former     Current packs/day: 0.00     Average packs/day: 0.5 packs/day for 26.5 years (13.3 ttl pk-yrs)     Types: Cigarettes     Start date: 1959     Quit date: 1985     Years since quittin.2     Passive exposure: Never    Smokeless tobacco: Never   Vaping Use    Vaping status: Never Used   Substance and Sexual Activity    Alcohol use: Not Currently     Alcohol/week: 7.0 standard drinks of alcohol     Types: 7 Glasses of wine per week     Comment: 12 oz every night    Drug use: Not Currently     Types: MDMA (ecstacy)    Sexual activity: Not Currently     Partners: Female     Birth control/protection: Abstinence   Other  Topics Concern    Not on file   Social History Narrative    Caffeine use    Marital relationship problem     Social Drivers of Health     Financial Resource Strain: Not on file   Food Insecurity: No Food Insecurity (6/11/2023)    Hunger Vital Sign     Worried About Running Out of Food in the Last Year: Never true     Ran Out of Food in the Last Year: Never true   Transportation Needs: No Transportation Needs (6/11/2023)    PRAPARE - Transportation     Lack of Transportation (Medical): No     Lack of Transportation (Non-Medical): No   Physical Activity: Not on file   Stress: Not on file   Social Connections: Not on file   Intimate Partner Violence: Not on file   Housing Stability: Low Risk  (6/11/2023)    Housing Stability Vital Sign     Unable to Pay for Housing in the Last Year: No     Number of Places Lived in the Last Year: 1     Unstable Housing in the Last Year: No     Past Surgical History:   Procedure Laterality Date    CATARACT EXTRACTION Left     Dr. Macedo; onset: 8/6/13    COLONOSCOPY      3/20/13, normal clon - Dr. Gan, repeat in 5-10 years    HIP SURGERY      JOINT REPLACEMENT      MI COLONOSCOPY FLX DX W/COLLJ SPEC WHEN PFRMD N/A 03/28/2018    Procedure: COLONOSCOPY;  Surgeon: CIARRA Gan MD;  Location: [facility];  Service: Colorectal    THYROIDECTOMY, PARTIAL      TONSILLECTOMY      TOTAL HIP ARTHROPLASTY           Physical Exam:   Physical Exam  Vitals and nursing note reviewed.   Constitutional:       General: He is not in acute distress.     Appearance: Normal appearance. He is well-developed. He is not diaphoretic.   HENT:      Head: Normocephalic.   Cardiovascular:      Rate and Rhythm: Normal rate.      Heart sounds: No murmur heard.     No friction rub. No gallop.   Pulmonary:      Effort: Pulmonary effort is normal. No respiratory distress.      Breath sounds: Normal breath sounds. No wheezing or rales.   Abdominal:      General: Bowel sounds are normal. There is no distension.       Palpations: Abdomen is soft.      Tenderness: There is no abdominal tenderness.   Musculoskeletal:         General: Normal range of motion.      Cervical back: Normal range of motion.   Skin:     General: Skin is warm and dry.   Neurological:      General: No focal deficit present.      Mental Status: He is alert. Mental status is at baseline.      Comments: Oriented to person, partial tps   Psychiatric:         Mood and Affect: Mood normal.         Behavior: Behavior normal.

## 2024-11-21 NOTE — PATIENT INSTRUCTIONS
Will try aricept (donepezil) 5mg at bedtime for memory loss.  If any upset stomach, dizziness, increased agitation, bad dreams, ok to stop.  We will call in 4 wks to see how it is going and increase if you are tolerating ok.  _______________________________________________________________________________________  Look into prepacked medications through pharmacy to see if that helps with organizing medications.

## 2024-11-22 ENCOUNTER — TELEPHONE (OUTPATIENT)
Dept: GERIATRICS | Facility: OTHER | Age: 79
End: 2024-11-22

## 2024-11-22 NOTE — TELEPHONE ENCOUNTER
I spoke to Gina about the drug interaction between the Aricept and the Citalopram. I let her know that Dori Goldman ordered different medication for Nathan. I also let her know that we will speak to them on 12/16/2024 for a medication check. I let her know that if she has anymore questions to give us a call.

## 2024-11-26 DIAGNOSIS — I10 ESSENTIAL HYPERTENSION: ICD-10-CM

## 2024-11-27 RX ORDER — LISINOPRIL AND HYDROCHLOROTHIAZIDE 20; 25 MG/1; MG/1
1 TABLET ORAL DAILY
Qty: 90 TABLET | Refills: 1 | Status: SHIPPED | OUTPATIENT
Start: 2024-11-27

## 2024-12-05 PROBLEM — Z91.89 DRIVING SAFETY ISSUE: Status: RESOLVED | Noted: 2024-05-20 | Resolved: 2024-12-05

## 2024-12-16 ENCOUNTER — TELEPHONE (OUTPATIENT)
Age: 79
End: 2024-12-16

## 2024-12-16 DIAGNOSIS — F03.A11 MILD DEMENTIA WITH AGITATION, UNSPECIFIED DEMENTIA TYPE (HCC): ICD-10-CM

## 2024-12-16 RX ORDER — MEMANTINE HYDROCHLORIDE 5 MG/1
5 TABLET ORAL 2 TIMES DAILY
Qty: 180 TABLET | Refills: 1 | Status: SHIPPED | OUTPATIENT
Start: 2024-12-16

## 2024-12-16 RX ORDER — MEMANTINE HYDROCHLORIDE 5 MG/1
5 TABLET ORAL 2 TIMES DAILY
Qty: 90 TABLET | Refills: 0 | Status: SHIPPED | OUTPATIENT
Start: 2024-12-16 | End: 2024-12-16 | Stop reason: SDUPTHER

## 2024-12-16 NOTE — TELEPHONE ENCOUNTER
Spoke with patient's wife, Gina, she offers no concerns or complaints regarding patient's Aricept. He is taking 5 mg at bedtime. She denies him having GI upset, diarrhea, dizziness, nightmares or increased agitation.     Gina reports that he is toleration medication well. I informed her that this information will be reported back to LOLLY Cardenas and that a therapeutic dose will be ordered and sent to pharmacy. She is in agreement to same.     Gina was encouraged to contact our office with any other questions or concerns.

## 2024-12-16 NOTE — TELEPHONE ENCOUNTER
Patient spouse is requesting [facility]. Pls sent in a 90 days supply.

## 2024-12-16 NOTE — TELEPHONE ENCOUNTER
Spoke with patient's wife, Gina, for medication check. Patient is currently taking Aricept 5 mg at bedtime. He is not experiencing any side effects and Gina denies that he is having GI upset, diarrhea, dizziness, nightmares or increased agitation. She reports that he is tolerating the Aricept well.     I informed Gina that this information will be sent to LOLLY Cardenas and she will send for therapeutic dose of 10 mg at bedtime.     Gina was also encouraged to call our office with any questions or concerns and agrees to same.

## 2024-12-16 NOTE — TELEPHONE ENCOUNTER
Please let Gina know pt should now increase namenda to 5mg tab in am and 5mg tab in pm.  She should notify use if any new symptoms.  Script sent to pharmacy.  (To clarify:  We are using the namenda instead of aricept because aricept had come up with an interaction between a different medication he is taking).

## 2025-01-04 NOTE — PSYCH
Psychotherapy Provided: Individual Psychotherapy 45 minutes     Length of time in session: 45 minutes, follow up in 2 week    Goals addressed in session: Goal 1, Goal 2 and Goal 3      Pain:      moderate to severe    0    Current suicide risk : Low     Data: Champ's wife is so upset that everything is his way , he is self centered, and in her words it is all about him  Regarding his goals he is taking his medications but according to his wife his habits and rituals are not reduced at all even on 80mg of Prozac  Regarding goal 2 his wife said he has not made any progress on improving in how he treats her and reacts to her  She is actually thinking of a divorce  He seems to not over react to it  Per Melvin Morgan he can't think on his ownor if he is not interested she says forget it  Regarding goal 3 he is not as angry or explosive but he just doesn't care per suzette  As long as he has her intermediate and medical he is happy  Plan: I asked her to see a therapist which she has agreed to  Behavioral Health Treatment Plan [facility]: Diagnosis and Treatment Plan explained to Tresa Sanchez relates understanding diagnosis and is agreeable to Treatment Plan   Yes
No

## 2025-01-17 DIAGNOSIS — G89.29 CHRONIC INTRACTABLE HEADACHE, UNSPECIFIED HEADACHE TYPE: ICD-10-CM

## 2025-01-17 DIAGNOSIS — R51.9 CHRONIC INTRACTABLE HEADACHE, UNSPECIFIED HEADACHE TYPE: ICD-10-CM

## 2025-01-17 RX ORDER — BUTALBITAL, ACETAMINOPHEN AND CAFFEINE 50; 325; 40 MG/1; MG/1; MG/1
1 TABLET ORAL DAILY PRN
Qty: 100 TABLET | Refills: 0 | Status: SHIPPED | OUTPATIENT
Start: 2025-01-17

## 2025-01-17 NOTE — TELEPHONE ENCOUNTER
Reason for call:   [x] Refill   [] Prior Auth  [] Other:     Office:   [x] PCP/Provider - klaus  [] Specialty/Provider -     Medication:   Butalbital-acetaminophen-caffeine -40,  1 qd, 100. Wife states patient takes 1 several days a week.    Pharmacy:   [facility]    Does the patient have enough for 3 days?   [x] Yes   [] No - Send as HP to POD

## 2025-01-20 ENCOUNTER — TELEPHONE (OUTPATIENT)
Age: 80
End: 2025-01-20

## 2025-01-20 DIAGNOSIS — E03.9 HYPOTHYROIDISM, UNSPECIFIED TYPE: ICD-10-CM

## 2025-01-20 DIAGNOSIS — E11.51 TYPE II DIABETES MELLITUS WITH PERIPHERAL ANGIOPATHY (HCC): ICD-10-CM

## 2025-01-20 DIAGNOSIS — E78.5 HYPERLIPIDEMIA, UNSPECIFIED HYPERLIPIDEMIA TYPE: ICD-10-CM

## 2025-01-20 DIAGNOSIS — I10 ESSENTIAL HYPERTENSION: Primary | ICD-10-CM

## 2025-01-20 NOTE — TELEPHONE ENCOUNTER
Patient called in requesting  for open lab orders to be faxed over to [facility], [phone]      Please review and advise patient when this is complete so they can have the testing done.

## 2025-01-21 LAB
ALBUMIN SERPL-MCNC: 4.3 G/DL (ref 3.5–5.7)
ALBUMIN/CREAT UR: 34.5
ALP SERPL-CCNC: 61 U/L (ref 35–120)
ALT SERPL-CCNC: 27 U/L
ANION GAP SERPL CALCULATED.3IONS-SCNC: 8 MMOL/L (ref 3–11)
AST SERPL-CCNC: 17 U/L
BASOPHILS # BLD AUTO: 0.1 THOU/CMM (ref 0–0.1)
BASOPHILS NFR BLD AUTO: 1 %
BILIRUB SERPL-MCNC: 0.5 MG/DL (ref 0.2–1)
BUN SERPL-MCNC: 30 MG/DL (ref 7–28)
CALCIUM SERPL-MCNC: 9.8 MG/DL (ref 8.5–10.5)
CHLORIDE SERPL-SCNC: 105 MMOL/L (ref 100–109)
CHOLEST SERPL-MCNC: 117 MG/DL
CHOLEST/HDLC SERPL: 2.2 {RATIO}
CO2 SERPL-SCNC: 29 MMOL/L (ref 21–31)
CREAT SERPL-MCNC: 1.07 MG/DL (ref 0.53–1.3)
CREAT UR-MCNC: 168.3 MG/DL (ref 50–200)
CYTOLOGY CMNT CVX/VAG CYTO-IMP: ABNORMAL
DIFFERENTIAL METHOD BLD: ABNORMAL
EOSINOPHIL # BLD AUTO: 0.3 THOU/CMM (ref 0–0.5)
EOSINOPHIL NFR BLD AUTO: 5 %
ERYTHROCYTE [DISTWIDTH] IN BLOOD BY AUTOMATED COUNT: 13.5 % (ref 12–16)
GFR/BSA.PRED SERPLBLD CYS-BASED-ARV: 69 ML/MIN/{1.73_M2}
GLUCOSE SERPL-MCNC: 94 MG/DL (ref 65–99)
HCT VFR BLD AUTO: 39.2 % (ref 37–48)
HDLC SERPL-MCNC: 53 MG/DL (ref 23–92)
HGB BLD-MCNC: 13.9 G/DL (ref 12.5–17)
LDLC SERPL CALC-MCNC: 48 MG/DL
LYMPHOCYTES # BLD AUTO: 1.2 THOU/CMM (ref 1–3)
LYMPHOCYTES NFR BLD AUTO: 20 %
MCH RBC QN AUTO: 36.4 PG (ref 27–36)
MCHC RBC AUTO-ENTMCNC: 35.3 G/DL (ref 32–37)
MCV RBC AUTO: 103 FL (ref 80–100)
MICROALBUMIN UR-MCNC: 5.8 MG/DL
MONOCYTES # BLD AUTO: 0.5 THOU/CMM (ref 0.3–1)
MONOCYTES NFR BLD AUTO: 8 %
NEUTROPHILS # BLD AUTO: 4.1 THOU/CMM (ref 1.8–7.8)
NEUTROPHILS NFR BLD AUTO: 66 %
NONHDLC SERPL-MCNC: 64 MG/DL
PLATELET # BLD AUTO: 263 THOU/CMM (ref 140–350)
PMV BLD REES-ECKER: 8 FL (ref 7.5–11.3)
POTASSIUM SERPL-SCNC: 4.4 MMOL/L (ref 3.5–5.2)
PROT SERPL-MCNC: 6.7 G/DL (ref 6.3–8.3)
RBC # BLD AUTO: 3.81 MILL/CMM (ref 4–5.4)
SODIUM SERPL-SCNC: 142 MMOL/L (ref 135–145)
TRIGL SERPL-MCNC: 82 MG/DL
TSH SERPL-ACNC: 4.36 UIU/ML (ref 0.45–5.33)
WBC # BLD AUTO: 6.2 THOU/CMM (ref 4–10.5)

## 2025-01-22 ENCOUNTER — RESULTS FOLLOW-UP (OUTPATIENT)
Dept: FAMILY MEDICINE CLINIC | Facility: CLINIC | Age: 80
End: 2025-01-22

## 2025-01-23 ENCOUNTER — OFFICE VISIT (OUTPATIENT)
Dept: FAMILY MEDICINE CLINIC | Facility: CLINIC | Age: 80
End: 2025-01-23
Payer: COMMERCIAL

## 2025-01-23 VITALS
RESPIRATION RATE: 18 BRPM | DIASTOLIC BLOOD PRESSURE: 60 MMHG | HEIGHT: 70 IN | OXYGEN SATURATION: 97 % | WEIGHT: 162 LBS | TEMPERATURE: 97.2 F | BODY MASS INDEX: 23.19 KG/M2 | HEART RATE: 63 BPM | SYSTOLIC BLOOD PRESSURE: 120 MMHG

## 2025-01-23 DIAGNOSIS — I10 ESSENTIAL HYPERTENSION: ICD-10-CM

## 2025-01-23 DIAGNOSIS — F60.5 OBSESSIVE COMPULSIVE PERSONALITY DISORDER (HCC): ICD-10-CM

## 2025-01-23 DIAGNOSIS — F41.9 ANXIETY: ICD-10-CM

## 2025-01-23 DIAGNOSIS — F03.A11 MILD DEMENTIA WITH AGITATION, UNSPECIFIED DEMENTIA TYPE (HCC): ICD-10-CM

## 2025-01-23 DIAGNOSIS — E78.5 HYPERLIPIDEMIA, UNSPECIFIED HYPERLIPIDEMIA TYPE: ICD-10-CM

## 2025-01-23 DIAGNOSIS — Z00.00 MEDICARE ANNUAL WELLNESS VISIT, SUBSEQUENT: ICD-10-CM

## 2025-01-23 DIAGNOSIS — E03.9 HYPOTHYROIDISM, UNSPECIFIED TYPE: ICD-10-CM

## 2025-01-23 DIAGNOSIS — E11.51 TYPE II DIABETES MELLITUS WITH PERIPHERAL ANGIOPATHY (HCC): Primary | ICD-10-CM

## 2025-01-23 LAB — SL AMB POCT HEMOGLOBIN AIC: 5.8 (ref ?–6.5)

## 2025-01-23 PROCEDURE — G0439 PPPS, SUBSEQ VISIT: HCPCS | Performed by: FAMILY MEDICINE

## 2025-01-23 PROCEDURE — G2211 COMPLEX E/M VISIT ADD ON: HCPCS | Performed by: FAMILY MEDICINE

## 2025-01-23 PROCEDURE — 83036 HEMOGLOBIN GLYCOSYLATED A1C: CPT | Performed by: FAMILY MEDICINE

## 2025-01-23 PROCEDURE — 99214 OFFICE O/P EST MOD 30 MIN: CPT | Performed by: FAMILY MEDICINE

## 2025-01-23 NOTE — PROGRESS NOTES
Name: Champ Nicole      : 1945      MRN: [id number]  Encounter Provider: Sanya Freeman MD  Encounter Date: 2025   Encounter department: [facility]    Assessment & Plan  Type II diabetes mellitus with peripheral angiopathy (HCC)    Lab Results   Component Value Date    HGBA1C 6.0 (H) 2024     Today HgA1C 5.8 in office. Diet control.     Orders:    POCT hemoglobin A1c    Comprehensive metabolic panel; Future    Hemoglobin A1C With EAG; Future    Lipid panel; Future    TSH, 3rd generation with Free T4 reflex; Future    Essential hypertension  Controlled. DASH diet. Continue Prinzide and hydralazine.   Orders:    Comprehensive metabolic panel; Future    Hemoglobin A1C With EAG; Future    Lipid panel; Future    TSH, 3rd generation with Free T4 reflex; Future    Hyperlipidemia, unspecified hyperlipidemia type  Low fat diet. Continue atorvastatin 80mg qhs.   Orders:    Comprehensive metabolic panel; Future    Hemoglobin A1C With EAG; Future    Lipid panel; Future    TSH, 3rd generation with Free T4 reflex; Future    Hypothyroidism, unspecified type  Continue levothyroxine 75mcg daily.   Orders:    Comprehensive metabolic panel; Future    Hemoglobin A1C With EAG; Future    Lipid panel; Future    TSH, 3rd generation with Free T4 reflex; Future    Anxiety  Stable. Continue citalopram 20mg QD.   Orders:    Comprehensive metabolic panel; Future    Hemoglobin A1C With EAG; Future    Lipid panel; Future    TSH, 3rd generation with Free T4 reflex; Future    Obsessive compulsive personality disorder (HCC)  Monitor.        Mild dementia with agitation, unspecified dementia type (HCC)   geriatrics.        Medicare annual wellness visit, subsequent           Depression Screening and Follow-up Plan: Patient was screened for depression during today's encounter. They screened negative with a PHQ-2 score of 0.    Falls Plan of Care: balance, strength, and gait training instructions were  provided.       Reviewed lab in 1/2025  TSH normal  CBC normal  CMP normal  Lipid 117/82/53/48  m/c 34.5    Flu shot yearly.  Got covid19 shots and booster.   Got PCV13 and pneumovax.   Recommend Td.   RTO in 6 months.     POA---daughter Linda and son Nikolai  Living will---full code        Preventive health issues were discussed with patient, and age appropriate screening tests were ordered as noted in patient's After Visit Summary. Personalized health advice and appropriate referrals for health education or preventive services given if needed, as noted in patient's After Visit Summary.    History of Present Illness     HPI     Pt is here with his wife.      DM---Diet control. Today HgA1C 5.8 in office.     HTN---He is on lisinopril-HCTZ 20-25mg QD and amlodipine 10mg QD. He has constipation and clogged toilet several times.   He used metamucil crackers daily which helped.     Hyperlipidemia---He is on atorvastatin 80mg qhs. Denies SE.      Anxiety/OCD---He stopped prozac 80mg QD. He is on zoloft 50mg daily for 1 year no help. He is on citalopram 20mg QD which helped.      Insomnia---he is on trazodone 50mg qhs which helped.     Mild dementia---He is off Aricept because side effects. He is on memantine 5mg bid per geriatrics.  He lives in [facility] living with wife.      Hypothyroidism---He is on levothyroxine 75mcg daily. Feels fine.      AS---[facility]. 6/2023 Echo done.       Tension headache---use fioricet as needed, not use it everyday. [facility] neurology yearly.       BPH---[facility] urology Dr. Alvarado for BPH yearly. He is on Lolly which helped.     Lives with wife. Does all ADL's.   Denies recent falls. Felt unsteady while walking, refused to use cane or walker. Refused physical therapy.   Denies depression.       Patient Care Team:  Sanya Freeman MD as PCP - General (Family Medicine)  CIARRA Gan MD as Endoscopist  Saman Alvarado MD (Urology)    Review of Systems   Constitutional:  Negative for  appetite change, chills and fever.   HENT:  Negative for congestion, ear pain, sinus pain and sore throat.    Eyes:  Negative for discharge and itching.   Respiratory:  Negative for apnea, cough, chest tightness, shortness of breath and wheezing.    Cardiovascular:  Negative for chest pain, palpitations and leg swelling.   Gastrointestinal:  Negative for abdominal pain, anal bleeding, constipation, diarrhea, nausea and vomiting.   Endocrine: Negative for cold intolerance, heat intolerance and polyuria.   Genitourinary:  Negative for difficulty urinating and dysuria.   Musculoskeletal:  Positive for gait problem. Negative for arthralgias, back pain and myalgias.   Skin:  Negative for rash.   Neurological:  Negative for dizziness and headaches.   Psychiatric/Behavioral:  Negative for agitation.      Medical History Reviewed by provider this encounter:  Tobacco  Allergies  Meds  Problems  Med Hx  Surg Hx  Fam Hx       Annual Wellness Visit Questionnaire   Champ is here for his Subsequent Wellness visit. Last Medicare Wellness visit information reviewed, patient interviewed and updates made to the record today.      Health Risk Assessment:   Patient rates overall health as good. Patient feels that their physical health rating is slightly worse. Patient is satisfied with their life. Eyesight was rated as same. Hearing was rated as same. Patient feels that their emotional and mental health rating is slightly worse. Patients states they are sometimes angry. Patient states they are sometimes unusually tired/fatigued. Pain experienced in the last 7 days has been some. Patient's pain rating has been 5/10. Patient states that he has experienced no weight loss or gain in last 6 months.     Depression Screening:   PHQ-2 Score: 0      Fall Risk Screening:   In the past year, patient has experienced: no history of falling in past year      Home Safety:  Patient has trouble with stairs inside or outside of their home.  Patient has working smoke alarms and has working carbon monoxide detector. Home safety hazards include: none.     Nutrition:   Current diet is Regular.     Medications:   Patient is not currently taking any over-the-counter supplements. Patient is not able to manage medications. Wife manages pts meds due to diagnosis    Activities of Daily Living (ADLs)/Instrumental Activities of Daily Living (IADLs):   Walk and transfer into and out of bed and chair?: Yes  Dress and groom yourself?: Yes    Bathe or shower yourself?: Yes    Feed yourself? Yes  Do your laundry/housekeeping?: No  Manage your money, pay your bills and track your expenses?: No  Make your own meals?: No    Do your own shopping?: No    Previous Hospitalizations:   Any hospitalizations or ED visits within the last 12 months?: No      Advance Care Planning:   Living will: Yes    Durable POA for healthcare: Yes    Advanced directive: Yes      PREVENTIVE SCREENINGS      Cardiovascular Screening:    General: Screening Not Indicated and History Lipid Disorder      Diabetes Screening:     General: Screening Not Indicated and History Diabetes      Prostate Cancer Screening:    General: Screening Not Indicated      Abdominal Aortic Aneurysm (AAA) Screening:    Risk factors include: tobacco use        Lung Cancer Screening:     General: Screening Not Indicated    Screening, Brief Intervention, and Referral to Treatment (SBIRT)    Screening    Typical number of drinks in a week: 0    Single Item Drug Screening:  How often have you used an illegal drug (including marijuana) or a prescription medication for non-medical reasons in the past year? never    Single Item Drug Screen Score: 0  Interpretation: Negative screen for possible drug use disorder    SDOH Risk Assessment  Social determinants of health (SDOH) risk assesment tool was completed. The tool at a minimum covered housing stability, food insecurity, transportation needs, and utility difficulty. Patient had at  "risk responses for the following SDOH domains: housing stability.     Social Drivers of Health     Food Insecurity: No Food Insecurity (1/23/2025)    Hunger Vital Sign     Worried About Running Out of Food in the Last Year: Never true     Ran Out of Food in the Last Year: Never true   Transportation Needs: No Transportation Needs (1/23/2025)    PRAPARE - Transportation     Lack of Transportation (Medical): No     Lack of Transportation (Non-Medical): No   Housing Stability: High Risk (1/23/2025)    Housing Stability Vital Sign     Unable to Pay for Housing in the Last Year: No     Number of Times Moved in the Last Year: 6     Homeless in the Last Year: No   Utilities: Not At Risk (1/23/2025)    [facility] Utilities     Threatened with loss of utilities: No     No results found.    Objective   /60 (BP Location: Left arm, Patient Position: Sitting, Cuff Size: Standard)   Pulse 63   Temp (!) 97.2 °F (36.2 °C) (Temporal)   Resp 18   Ht 5' 10\" (1.778 m)   Wt 73.5 kg (162 lb)   SpO2 97%   BMI 23.24 kg/m²     Physical Exam  Constitutional:       Appearance: He is well-developed.   HENT:      Head: Normocephalic and atraumatic.   Eyes:      General:         Right eye: No discharge.         Left eye: No discharge.      Conjunctiva/sclera: Conjunctivae normal.   Cardiovascular:      Rate and Rhythm: Normal rate and regular rhythm.      Heart sounds: Normal heart sounds. No murmur heard.     No friction rub. No gallop.   Pulmonary:      Effort: Pulmonary effort is normal. No respiratory distress.      Breath sounds: Normal breath sounds. No wheezing or rales.   Abdominal:      General: Bowel sounds are normal. There is no distension.      Palpations: Abdomen is soft.      Tenderness: There is no abdominal tenderness. There is no guarding.   Musculoskeletal:         General: Normal range of motion.      Cervical back: Normal range of motion and neck supple.      Right lower leg: No edema.      Left lower leg: No edema. "   Neurological:      Mental Status: He is alert.

## 2025-01-23 NOTE — ASSESSMENT & PLAN NOTE
Low fat diet. Continue atorvastatin 80mg qhs.   Orders:    Comprehensive metabolic panel; Future    Hemoglobin A1C With EAG; Future    Lipid panel; Future    TSH, 3rd generation with Free T4 reflex; Future

## 2025-01-23 NOTE — ASSESSMENT & PLAN NOTE
Controlled. DASH diet. Continue Prinzide and hydralazine.   Orders:    Comprehensive metabolic panel; Future    Hemoglobin A1C With EAG; Future    Lipid panel; Future    TSH, 3rd generation with Free T4 reflex; Future

## 2025-01-23 NOTE — PATIENT INSTRUCTIONS
Medicare Preventive Visit Patient Instructions  Thank you for completing your Welcome to Medicare Visit or Medicare Annual Wellness Visit today. Your next wellness visit will be due in one year (1/24/2026).  The screening/preventive services that you may require over the next 5-10 years are detailed below. Some tests may not apply to you based off risk factors and/or age. Screening tests ordered at today's visit but not completed yet may show as past due. Also, please note that scanned in results may not display below.  Preventive Screenings:  Service Recommendations Previous Testing/Comments   Colorectal Cancer Screening  Colonoscopy    Fecal Occult Blood Test (FOBT)/Fecal Immunochemical Test (FIT)  Fecal DNA/Cologuard Test  Flexible Sigmoidoscopy Age: 45-75 years old   Colonoscopy: every 10 years (May be performed more frequently if at higher risk)  OR  FOBT/FIT: every 1 year  OR  Cologuard: every 3 years  OR  Sigmoidoscopy: every 5 years  Screening may be recommended earlier than age 45 if at higher risk for colorectal cancer. Also, an individualized decision between you and your healthcare provider will decide whether screening between the ages of 76-85 would be appropriate. Colonoscopy: 03/28/2018  FOBT/FIT: Not on file  Cologuard: Not on file  Sigmoidoscopy: Not on file          Prostate Cancer Screening Individualized decision between patient and health care provider in men between ages of 55-69   Medicare will cover every 12 months beginning on the day after your 50th birthday PSA: No results in last 5 years     Screening Not Indicated     Hepatitis C Screening Once for adults born between 1945 and 1965  More frequently in patients at high risk for Hepatitis C Hep C Antibody: Not on file        Diabetes Screening 1-2 times per year if you're at risk for diabetes or have pre-diabetes Fasting glucose: No results in last 5 years (No results in last 5 years)  A1C: 6.0 % (7/12/2024)  Screening Not  Indicated  History Diabetes   Cholesterol Screening Once every 5 years if you don't have a lipid disorder. May order more often based on risk factors. Lipid panel: 01/21/2025  Screening Not Indicated  History Lipid Disorder      Other Preventive Screenings Covered by Medicare:  Abdominal Aortic Aneurysm (AAA) Screening: covered once if your at risk. You're considered to be at risk if you have a family history of AAA or a male between the age of 65-75 who smoking at least 100 cigarettes in your lifetime.  Lung Cancer Screening: covers low dose CT scan once per year if you meet all of the following conditions: (1) Age 55-77; (2) No signs or symptoms of lung cancer; (3) Current smoker or have quit smoking within the last 15 years; (4) You have a tobacco smoking history of at least 20 pack years (packs per day x number of years you smoked); (5) You get a written order from a healthcare provider.  Glaucoma Screening: covered annually if you're considered high risk: (1) You have diabetes OR (2) Family history of glaucoma OR (3)  aged 50 and older OR (4)  American aged 65 and older  Osteoporosis Screening: covered every 2 years if you meet one of the following conditions: (1) Have a vertebral abnormality; (2) On glucocorticoid therapy for more than 3 months; (3) Have primary hyperparathyroidism; (4) On osteoporosis medications and need to assess response to drug therapy.  HIV Screening: covered annually if you're between the age of 15-65. Also covered annually if you are younger than 15 and older than 65 with risk factors for HIV infection. For pregnant patients, it is covered up to 3 times per pregnancy.    Immunizations:  Immunization Recommendations   Influenza Vaccine Annual influenza vaccination during flu season is recommended for all persons aged >= 6 months who do not have contraindications   Pneumococcal Vaccine   * Pneumococcal conjugate vaccine = PCV13 (Prevnar 13), PCV15 (Vaxneuvance),  PCV20 (Prevnar 20)  * Pneumococcal polysaccharide vaccine = PPSV23 (Pneumovax) Adults 19-65 yo with certain risk factors or if 65+ yo  If never received any pneumonia vaccine: recommend Prevnar 20 (PCV20)  Give PCV20 if previously received 1 dose of PCV13 or PPSV23   Hepatitis B Vaccine 3 dose series if at intermediate or high risk (ex: diabetes, end stage renal disease, liver disease)   Respiratory syncytial virus (RSV) Vaccine - COVERED BY MEDICARE PART D  * RSVPreF3 (Arexvy) CDC recommends that adults 60 years of age and older may receive a single dose of RSV vaccine using shared clinical decision-making (SCDM)   Tetanus (Td) Vaccine - COST NOT COVERED BY MEDICARE PART B Following completion of primary series, a booster dose should be given every 10 years to maintain immunity against tetanus. Td may also be given as tetanus wound prophylaxis.   Tdap Vaccine - COST NOT COVERED BY MEDICARE PART B Recommended at least once for all adults. For pregnant patients, recommended with each pregnancy.   Shingles Vaccine (Shingrix) - COST NOT COVERED BY MEDICARE PART B  2 shot series recommended in those 19 years and older who have or will have weakened immune systems or those 50 years and older     Health Maintenance Due:      Topic Date Due   • Colorectal Cancer Screening  Discontinued     Immunizations Due:      Topic Date Due   • Hepatitis A Vaccine (1 of 2 - Risk 2-dose series) Never done     Advance Directives   What are advance directives?  Advance directives are legal documents that state your wishes and plans for medical care. These plans are made ahead of time in case you lose your ability to make decisions for yourself. Advance directives can apply to any medical decision, such as the treatments you want, and if you want to donate organs.   What are the types of advance directives?  There are many types of advance directives, and each state has rules about how to use them. You may choose a combination of any of  the following:  Living will:  This is a written record of the treatment you want. You can also choose which treatments you do not want, which to limit, and which to stop at a certain time. This includes surgery, medicine, IV fluid, and tube feedings.   Durable power of  for healthcare (DPAHC):  This is a written record that states who you want to make healthcare choices for you when you are unable to make them for yourself. This person, called a proxy, is usually a family member or a friend. You may choose more than 1 proxy.  Do not resuscitate (DNR) order:  A DNR order is used in case your heart stops beating or you stop breathing. It is a request not to have certain forms of treatment, such as CPR. A DNR order may be included in other types of advance directives.  Medical directive:  This covers the care that you want if you are in a coma, near death, or unable to make decisions for yourself. You can list the treatments you want for each condition. Treatment may include pain medicine, surgery, blood transfusions, dialysis, IV or tube feedings, and a ventilator (breathing machine).  Values history:  This document has questions about your views, beliefs, and how you feel and think about life. This information can help others choose the care that you would choose.  Why are advance directives important?  An advance directive helps you control your care. Although spoken wishes may be used, it is better to have your wishes written down. Spoken wishes can be misunderstood, or not followed. Treatments may be given even if you do not want them. An advance directive may make it easier for your family to make difficult choices about your care.       © Copyright [employer] 2018 Information is for End User's use only and may not be sold, redistributed or otherwise used for commercial purposes. All illustrations and images included in CareNotes® are the copyrighted property of A.D.A[employer]M., Inc. or [employer]

## 2025-01-23 NOTE — ASSESSMENT & PLAN NOTE
Stable. Continue citalopram 20mg QD.   Orders:    Comprehensive metabolic panel; Future    Hemoglobin A1C With EAG; Future    Lipid panel; Future    TSH, 3rd generation with Free T4 reflex; Future

## 2025-01-23 NOTE — ASSESSMENT & PLAN NOTE
Lab Results   Component Value Date    HGBA1C 6.0 (H) 07/12/2024     Today HgA1C 5.8 in office. Diet control.     Orders:    POCT hemoglobin A1c    Comprehensive metabolic panel; Future    Hemoglobin A1C With EAG; Future    Lipid panel; Future    TSH, 3rd generation with Free T4 reflex; Future

## 2025-01-23 NOTE — ASSESSMENT & PLAN NOTE
Continue levothyroxine 75mcg daily.   Orders:    Comprehensive metabolic panel; Future    Hemoglobin A1C With EAG; Future    Lipid panel; Future    TSH, 3rd generation with Free T4 reflex; Future

## 2025-02-25 DIAGNOSIS — G47.00 INSOMNIA, UNSPECIFIED TYPE: ICD-10-CM

## 2025-02-25 DIAGNOSIS — K59.00 CONSTIPATION, UNSPECIFIED CONSTIPATION TYPE: Primary | ICD-10-CM

## 2025-02-25 RX ORDER — TRAZODONE HYDROCHLORIDE 50 MG/1
50 TABLET ORAL
Qty: 100 TABLET | Refills: 1 | Status: CANCELLED | OUTPATIENT
Start: 2025-02-25

## 2025-02-25 RX ORDER — TRAZODONE HYDROCHLORIDE 50 MG/1
50 TABLET ORAL
Qty: 100 TABLET | Refills: 1 | Status: SHIPPED | OUTPATIENT
Start: 2025-02-25

## 2025-02-25 RX ORDER — DOCUSATE SODIUM 100 MG/1
100 CAPSULE, LIQUID FILLED ORAL 2 TIMES DAILY
Qty: 180 CAPSULE | Refills: 0 | Status: SHIPPED | OUTPATIENT
Start: 2025-02-25

## 2025-02-25 NOTE — TELEPHONE ENCOUNTER
Patient's wife asked if patient can get a refill of medication. Please advise if medication refill adequate. Thank you

## 2025-02-25 NOTE — PROGRESS NOTES
Wife is here for her appt. Wife states pt has constipation, would like to try colace.   Need refill for trazodone.

## 2025-03-20 DIAGNOSIS — I73.9 PAD (PERIPHERAL ARTERY DISEASE) (HCC): ICD-10-CM

## 2025-03-20 RX ORDER — ATORVASTATIN CALCIUM 80 MG/1
80 TABLET, FILM COATED ORAL DAILY
Qty: 90 TABLET | Refills: 1 | Status: SHIPPED | OUTPATIENT
Start: 2025-03-20

## 2025-04-09 DIAGNOSIS — N40.0 BENIGN PROSTATIC HYPERPLASIA, UNSPECIFIED WHETHER LOWER URINARY TRACT SYMPTOMS PRESENT: ICD-10-CM

## 2025-04-09 DIAGNOSIS — F41.9 ANXIETY AND DEPRESSION: ICD-10-CM

## 2025-04-09 DIAGNOSIS — I10 ESSENTIAL HYPERTENSION: ICD-10-CM

## 2025-04-09 DIAGNOSIS — F32.A ANXIETY AND DEPRESSION: ICD-10-CM

## 2025-04-09 RX ORDER — HYDRALAZINE HYDROCHLORIDE 10 MG/1
10 TABLET, FILM COATED ORAL 3 TIMES DAILY
Qty: 270 TABLET | Refills: 0 | Status: SHIPPED | OUTPATIENT
Start: 2025-04-09

## 2025-04-09 RX ORDER — CITALOPRAM HYDROBROMIDE 20 MG/1
20 TABLET ORAL DAILY
Qty: 90 TABLET | Refills: 1 | Status: SHIPPED | OUTPATIENT
Start: 2025-04-09

## 2025-04-09 RX ORDER — DUTASTERIDE AND TAMSULOSIN HYDROCHLORIDE CAPSULES .5; .4 MG/1; MG/1
1 CAPSULE ORAL DAILY
Qty: 90 CAPSULE | Refills: 1 | Status: SHIPPED | OUTPATIENT
Start: 2025-04-09

## 2025-04-09 NOTE — TELEPHONE ENCOUNTER
Reason for call:   [x] Refill   [] Prior Auth  [] Other:     Office:   [x] PCP/Provider -   [] Specialty/Provider -     Medication:     Hydralazine 10 mg tablet taken by mouth 3x daily #270 tabs     Dutasteride-Tamsulosin HCl 0.5-0.4 mg capsules. Take 1 capsule by mouth daily #90 caps     Citalopram 20 mg tablet taken by mouth once daily #90 tabs     Pharmacy: [facility] - [address] [phone]     Local Pharmacy   Does the patient have enough for 3 days?   [] Yes   [] No - Send as HP to POD    Mail Away Pharmacy   Does the patient have enough for 10 days?   [] Yes   [x] No - Send as HP to POD

## 2025-05-05 DIAGNOSIS — E03.9 HYPOTHYROIDISM, UNSPECIFIED TYPE: ICD-10-CM

## 2025-05-06 RX ORDER — LEVOTHYROXINE SODIUM 75 UG/1
75 TABLET ORAL DAILY
Qty: 90 TABLET | Refills: 3 | Status: SHIPPED | OUTPATIENT
Start: 2025-05-06

## 2025-05-14 ENCOUNTER — TELEPHONE (OUTPATIENT)
Age: 80
End: 2025-05-14

## 2025-05-14 NOTE — TELEPHONE ENCOUNTER
05/14/25    Patient Wife Miss. Fuentes, called office today to schedule an appt with Miss. Ordaz for New Symptoms.    Miss. Fuentes shared that the Staff from the [facility] where they live ([facility]) Notice that patient is showing signs of PARKINSON.     Miss. Fuentes is Kindly requesting if appt can be schedule, he needs to be schedule at the [facility], TUESDAYS, WEDNESDAYS and THURSDAYS ONLY, due to Transportation from the [facility], and a 1:30 PM or 2:30 PM Appt time slot.      Please Review if appropriate to schedule with Miss. Ordaz and how long she would like the visit (OVS or OVL).      Any questions, please contact Patient Wife Miss. Fuentes at [phone].  Thank You.

## 2025-05-14 NOTE — TELEPHONE ENCOUNTER
1st attempt to contact pt to schedule OVL 60 mins appt with Keshia Ordaz for new issues and concerns. Please see notes below.     I called pt, no answer, left VM to call back to schedule an appt.     Thank you,     Carly

## 2025-05-14 NOTE — TELEPHONE ENCOUNTER
Patient's spouse  called to report that they fell the patient needs to be seen again by neurologist     Patient spouse, Gina  stated they feel like the patient is getting Parkinson's       Patient spouse not listed on the intermediate Form       Informed the spouse that we would need to speak to [facility] to schedule the patient for a new appt for new symptoms to coordinate the transportation as Patient spouse stated they will be doing any transports       They will inform the [facility] staff to call us to schedule something                         Thank you!

## 2025-05-19 NOTE — TELEPHONE ENCOUNTER
Called and spoke to wife - provided sooner appt for pt on 5/30 @ 2pm w/ Keshia Ordaz in [place]. Pt's wife very appreciative of call. She will work on getting transportation from [facility] for the visit. Appt card placed with outgoing mail

## 2025-05-19 NOTE — TELEPHONE ENCOUNTER
"Phone call from wife and Darren, therapist at [facility], in regards to concerns with the patient. Her concerns include; increase in shuffling gait, multiple falls and \"freezing episodes\".  You may call her at [phone] to discuss these concerns.  She is requesting that the patient has a sooner appointment.  Please call the wife to schedule a sooner appointment if available.    Thank you,  Gina  "

## 2025-05-26 DIAGNOSIS — I10 ESSENTIAL HYPERTENSION: ICD-10-CM

## 2025-05-27 RX ORDER — LISINOPRIL AND HYDROCHLOROTHIAZIDE 20; 25 MG/1; MG/1
1 TABLET ORAL DAILY
Qty: 90 TABLET | Refills: 1 | Status: SHIPPED | OUTPATIENT
Start: 2025-05-27

## 2025-05-30 ENCOUNTER — OFFICE VISIT (OUTPATIENT)
Dept: NEUROLOGY | Facility: CLINIC | Age: 80
End: 2025-05-30
Payer: COMMERCIAL

## 2025-05-30 VITALS
DIASTOLIC BLOOD PRESSURE: 62 MMHG | BODY MASS INDEX: 23.19 KG/M2 | HEART RATE: 61 BPM | RESPIRATION RATE: 18 BRPM | HEIGHT: 70 IN | WEIGHT: 162 LBS | OXYGEN SATURATION: 98 % | TEMPERATURE: 97.6 F | SYSTOLIC BLOOD PRESSURE: 120 MMHG

## 2025-05-30 DIAGNOSIS — R26.89 SHUFFLING GAIT: Primary | ICD-10-CM

## 2025-05-30 PROCEDURE — G2211 COMPLEX E/M VISIT ADD ON: HCPCS | Performed by: PHYSICIAN ASSISTANT

## 2025-05-30 PROCEDURE — 99215 OFFICE O/P EST HI 40 MIN: CPT | Performed by: PHYSICIAN ASSISTANT

## 2025-05-30 RX ORDER — CARBIDOPA AND LEVODOPA 25; 100 MG/1; MG/1
TABLET ORAL
Qty: 90 TABLET | Refills: 3 | Status: SHIPPED | OUTPATIENT
Start: 2025-05-30

## 2025-05-30 NOTE — PATIENT INSTRUCTIONS
Will start a trial of Sinemet for his gait.  Start with Sinemet 25/100mg 1/2tab three times a day about 1 hour prior to meals.  After 1 week can increase to 1tab three times day.  Watch for any improvement of gait and walking.  Watch for any side effects including hallucinations, dizziness or increased sleepiness.     He was encouraged to remain active.

## 2025-05-30 NOTE — PROGRESS NOTES
Name: Champ Nicole      : 1945      MRN: [id number]  Encounter Provider: Keshia Ordaz PA-C  Encounter Date: 2025   Encounter department: [facility] [place]  :  Assessment & Plan  Shuffling gait  Patient presents with worsening gait over the past year.  He is now using a walker.  Wife has some concerns with this being Parkinson's disease as do some of the residents at his current facility.    On exam he had no resting tremor, very slight action tremor with finger-to-nose testing bilaterally.  No clear decrement however there was perhaps some asymmetry with slowness on the right compared to left with finger taps, hand  and heel taps.  Mild rigidity with activation only on the right in the upper extremity.  Positive glabellar, negative palmomental reflexes.  He had difficulty standing from the wheelchair, ambulating with a walker, shortened stride with shuffling noted and slight freezing on turns.    We had a very long discussion in regards to the diagnosis of Parkinson's disease as well as treatment options.  At this time he is does fit some of the clinical picture for Parkinson's disease although certainly his gait is the main symptom.  We discussed options including testing to help with diagnosis including a DaTscan or SynOne skin biopsy versus a trial of medication.  At this time he would be open to a trial of medication and we will start a low-dose of Sinemet.  He will start by taking Sinemet  mg half a tab 3 times a day about an hour before meals for the first week and then increase to Sinemet  mg 1 tab 3 times a day.  He will watch for any improvement of symptoms along with any side effects such as hallucinations, dizziness or increased sleepiness.    Certainly his cognitive issues preceded these movement issues by a number of years and he does not have any hallucinations at this time.  Differential would still include possible Lewy body dementia  however the history is somewhat atypical.  This could certainly be a combination of Alzheimer's dementia and parkinsonism.  Will continue to treat symptomatically at this time.    Orders:    carbidopa-levodopa (SINEMET)  mg per tablet; Take 1/2 tab tid (about 1 hour prior to meals) for a week then increase to 1tab three times a day          History of Present Illness   Mr. Champ Nicole is a male with mild dementia followed by Geriatrics who presents today at the request of his wife for possible Parkinson's disease.     To review he was previously seen for dementia, last visit 11/14/24. Memory symptoms began gradually in 2011 after skilled nursing with slow progression. He was described as having behavioral issues, being racist and possessive. His mother had dementia in her 90's and had OCD. Wife stopped Aricept in 5/2023 given she felt his memory was worse. Had a FTD evaluation performed and scored 99% probability of failing on the road testing, [employer] notified, told that he can no longer drive.     INTERVAL HISTORY:  Last MoCA 11/21/24 with Geriatrics - 20/30   At that time he was started on a trial of Namenda   He now resides at [facility] with his wife   Overall the wife feels that he is doing well from a memory standpoint   Main concern today is his walking   Per the wife she feels that he has PD   Per the wife some of the other residents have also made comments of I'm maybe having PD  He does struggle with constipation, now on a medication and Metamucil which helps   Using a walker since moving into [facility]   He has always had issues with not lifting his feet, he would get in trouble with this when in the  as well   He does not like being at [facility] and does not participate in many activities there  No tremors   He denies any change in his walking, per the wife he tends to shuffle more than in the past   He can dress and shower however his wife will help him, wife will shave him  "  He states that he can do all of these things however he does not want to and his wife gets upset and just helps him   He does not sleep well at night, tends to stay up and read   No issues with swallowing food, he has always struggled with swallowing pills   No hallucinations   No family history of PD or tremors     Prior workup:  MRI revealed mild atrophy with early microvascular disease and neuroquant imaging consistent with neurodegenerative process.  Neuropsych testing was consistent with mild cognitive impairment with no clinically significant anxiety or depression.         Review of Systems   Constitutional: Negative.  Negative for chills and fever.   HENT: Negative.  Negative for ear pain and sore throat.    Eyes: Negative.  Negative for pain and visual disturbance.   Respiratory: Negative.  Negative for cough and shortness of breath.    Cardiovascular: Negative.  Negative for chest pain and palpitations.   Gastrointestinal: Negative.  Negative for abdominal pain and vomiting.   Endocrine: Negative.    Genitourinary: Negative.  Negative for dysuria and hematuria.   Musculoskeletal: Negative.  Negative for arthralgias and back pain.   Skin: Negative.  Negative for color change and rash.   Allergic/Immunologic: Negative.    Neurological: Negative.  Negative for seizures and syncope.   Hematological: Negative.    Psychiatric/Behavioral: Negative.     All other systems reviewed and are negative.   I have personally reviewed the MA's review of systems and made changes as necessary.         Objective   /62 (BP Location: Left arm, Patient Position: Sitting, Cuff Size: Adult)   Pulse 61   Temp 97.6 °F (36.4 °C) (Temporal)   Resp 18   Ht 5' 10\" (1.778 m)   Wt 73.5 kg (162 lb)   SpO2 98%   BMI 23.24 kg/m²     Physical Exam  Constitutional:       Appearance: Normal appearance.   HENT:      Right Ear: Hearing normal.      Left Ear: Hearing normal.     Eyes:      General: Lids are normal.      Extraocular " Movements: Extraocular movements intact.      Pupils: Pupils are equal, round, and reactive to light.     Pulmonary:      Effort: Pulmonary effort is normal.     Neurological:      Mental Status: He is alert.       Neurological Exam  Mental Status  Alert.    Cranial Nerves  CN III, IV, VI: Extraocular movements intact bilaterally. Normal lids and orbits bilaterally. Pupils equal round and reactive to light bilaterally.  CN V:  Right: Facial sensation is normal.  Left: Facial sensation is normal on the left.  CN VII:  Right: There is no facial weakness.  Left: There is no facial weakness.  CN VIII:  Right: Hearing is normal.  Left: Hearing is normal.  CN IX, X: Palate elevates symmetrically  CN XI: Shoulder shrug strength is normal.  CN XII: Tongue midline without atrophy or fasciculations.    Sensory  Light touch is normal in upper and lower extremities.     Gait  Casual gait:  Short shuffled stride, using walker   Stooped posture .                                  Date of exam:  5/30/25         Speech  0    Facial Expression  0    Rigidity - Neck  0    Rigidity - Upper Extremity (Right)  1 with activation    Rigidity - Upper Extremity (Left)   0    Rigidity - Lower Extremity (Right)  0    Rigidity - Lower Extremity (Left)   0    Finger Taps (Right)   1    Finger Taps (Left)   0    Hand  (Right)  0    Hand  (Left)   0    Pronation/Supination (Right)  1    Pronation/Supination (Left)   0    Heel Taps (Right) 0    Heel Taps(Left) 0    Arising from Chair   2    Gait   3 walker     Postural Stability   2    Posture 2    Global spontaneity of movement 1    Postural Tremor (Right) 0    Postural Tremor (Left) 0    Kinetic Tremor (Right)  1    Kinetic Tremor (Left)  1    Rest tremor  RUE 0    Rest tremor  LUE 0    Rest tremor  RLE 0    Reset tremor  LLE 0    Lip/Jaw Tremor  0    Motor Exam Total:

## 2025-05-30 NOTE — ASSESSMENT & PLAN NOTE
Patient presents with worsening gait over the past year.  He is now using a walker.  Wife has some concerns with this being Parkinson's disease as do some of the residents at his current facility.    On exam he had no resting tremor, very slight action tremor with finger-to-nose testing bilaterally.  No clear decrement however there was perhaps some asymmetry with slowness on the right compared to left with finger taps, hand  and heel taps.  Mild rigidity with activation only on the right in the upper extremity.  Positive glabellar, negative palmomental reflexes.  He had difficulty standing from the wheelchair, ambulating with a walker, shortened stride with shuffling noted and slight freezing on turns.    We had a very long discussion in regards to the diagnosis of Parkinson's disease as well as treatment options.  At this time he is does fit some of the clinical picture for Parkinson's disease although certainly his gait is the main symptom.  We discussed options including testing to help with diagnosis including a DaTscan or SynOne skin biopsy versus a trial of medication.  At this time he would be open to a trial of medication and we will start a low-dose of Sinemet.  He will start by taking Sinemet  mg half a tab 3 times a day about an hour before meals for the first week and then increase to Sinemet  mg 1 tab 3 times a day.  He will watch for any improvement of symptoms along with any side effects such as hallucinations, dizziness or increased sleepiness.    Certainly his cognitive issues preceded these movement issues by a number of years and he does not have any hallucinations at this time.  Differential would still include possible Lewy body dementia however the history is somewhat atypical.  This could certainly be a combination of Alzheimer's dementia and parkinsonism.  Will continue to treat symptomatically at this time.    Orders:    carbidopa-levodopa (SINEMET)  mg per tablet;  Take 1/2 tab tid (about 1 hour prior to meals) for a week then increase to 1tab three times a day

## 2025-06-16 ENCOUNTER — TELEPHONE (OUTPATIENT)
Age: 80
End: 2025-06-16

## 2025-06-16 NOTE — TELEPHONE ENCOUNTER
LSW spoke with patient's spouse-Gina. LSW offered virtual visit or offered transportation as spouse expressed difficulty with coming to the appointment. Patient's spouse declined the visit as she feels [facility] staff & nursing is providing all the care they need at this time. LSW stated that we are always available if she feels patient needs to be seen by senior care. Patient's spouse expressed understand.

## 2025-06-20 DIAGNOSIS — F03.A11 MILD DEMENTIA WITH AGITATION, UNSPECIFIED DEMENTIA TYPE (HCC): ICD-10-CM

## 2025-06-23 ENCOUNTER — TELEPHONE (OUTPATIENT)
Dept: FAMILY MEDICINE CLINIC | Facility: CLINIC | Age: 80
End: 2025-06-23

## 2025-06-23 ENCOUNTER — TELEPHONE (OUTPATIENT)
Dept: NEUROLOGY | Facility: CLINIC | Age: 80
End: 2025-06-23

## 2025-06-23 RX ORDER — MEMANTINE HYDROCHLORIDE 5 MG/1
5 TABLET ORAL 2 TIMES DAILY
Qty: 180 TABLET | Refills: 3 | OUTPATIENT
Start: 2025-06-23

## 2025-06-23 NOTE — TELEPHONE ENCOUNTER
[facility]. Provider at Facility is managing patient's care. Please update chart and remove Dr. Freeman as PCP per provider's request.

## 2025-06-27 ENCOUNTER — TELEPHONE (OUTPATIENT)
Dept: NEUROLOGY | Facility: CLINIC | Age: 80
End: 2025-06-27

## 2025-06-27 ENCOUNTER — OFFICE VISIT (OUTPATIENT)
Dept: NEUROLOGY | Facility: CLINIC | Age: 80
End: 2025-06-27
Payer: COMMERCIAL

## 2025-06-27 VITALS
OXYGEN SATURATION: 98 % | DIASTOLIC BLOOD PRESSURE: 70 MMHG | WEIGHT: 165 LBS | HEART RATE: 64 BPM | HEIGHT: 70 IN | SYSTOLIC BLOOD PRESSURE: 130 MMHG | BODY MASS INDEX: 23.62 KG/M2

## 2025-06-27 DIAGNOSIS — R26.89 SHUFFLING GAIT: Primary | ICD-10-CM

## 2025-06-27 PROCEDURE — 99214 OFFICE O/P EST MOD 30 MIN: CPT | Performed by: PHYSICIAN ASSISTANT

## 2025-06-27 PROCEDURE — G2211 COMPLEX E/M VISIT ADD ON: HCPCS | Performed by: PHYSICIAN ASSISTANT

## 2025-06-27 NOTE — TELEPHONE ENCOUNTER
Faxed Benefit Investigation Form to [email or web address] along with all clinical info/insurance cards  FAX# [phone]  Will await insurance determination  Determinations can take anywhere from 2 to 4 weeks   Will follow

## 2025-06-27 NOTE — Clinical Note
Kavon Weinberg!  I would like to try and get this patient set up for a SynOne skin biopsy.  Thank so much!

## 2025-06-27 NOTE — PATIENT INSTRUCTIONS
For now will have him remain on Sinemet 1tab three times a day (am, afternoon and evening)  Will get SynOne skin biopsy to help with diagnosis

## 2025-06-27 NOTE — PROGRESS NOTES
Name: Champ Nicole      : 1945      MRN: [id number]  Encounter Provider: Keshia Ordaz PA-C  Encounter Date: 2025   Encounter department: [facility] [place]  :  Assessment & Plan  Shuffling gait  Patient with worsening gait over the past year.  He is now using a walker.  Wife and patient deny any significant improvement with low-dose Sinemet.  Per the wife however he did have a recent PT session in which there was improvement of him being able to get out of the chair and walking.  Overall his exam appears relatively the same as the last visit with some mild right sided slowness and shuffling/freezing noted with ambulation.    We had a long discussion in regards to our options at this time.  1 option would be to continue to increase his Sinemet dose further to see if there is any added benefit on higher dosing.  There is some concern given he has been having some more dizziness when standing especially after meals since being on the Sinemet.  Alternatively we could consider one of the confirmatory test such as a DaTscan or SynOne skin biopsy to help with the diagnosis.    At this time the patient would like to proceed with the skin biopsy prior to increasing the Sinemet dose any further.  Depending on these results we can either wean off of the medication if negative, or try slowly and cautiously increasing the medication if positive.    For now he will remain on Sinemet 1 tab 3 times a day (a.m., afternoon and evening).  He will also continue to work with physical therapy, we did discuss the importance of regular exercise and activity.  Unfortunately he tends to like to spend most of his time sitting in front of the TV.    Certainly his cognitive issues preceded these movement issues by a number of years and he does not have any hallucinations at this time.  Differential would still include possible Lewy body dementia however the history is somewhat atypical.  This could  certainly be a combination of Alzheimer's dementia and parkinsonism.  Will continue to treat symptomatically at this time.               History of Present Illness   Mr. Champ Nicole is a male with mild dementia followed by Geriatrics who presents today at the request of his wife for possible Parkinson's disease. To review he was previously seen for dementia, last visit with our office 11/14/24. Memory symptoms began gradually in 2011 after alf with slow progression. He was described as having behavioral issues, being racist and possessive. His mother had dementia in her 90's and had OCD. Wife stopped Aricept in 5/2023 given she felt his memory was worse. Had a FTD evaluation performed and scored 99% probability of failing on the road testing, [employer] notified, told that he can no longer drive. No on Namenda, managed by Geriatrics.     He presented with concerns for his walking. Wife questioned if he had PD. No tremors.  Shuffling feet, now using a walker. Also struggles with constipation.  No hallucinations. No family history of PD or tremors. On exam he was noted to have some parkinsonian features including a short, shuffled stride.  He was started on atrial of Sinemet.        INTERVAL HISTORY:  Wife has not noticed any clear improvement since starting the Sinemet   Wife feels that he still shuffles his feet across the floor   He does not feel there has been any change in his walking   He has not been getting the first dose of Sinemet until midday given he tends to stay in bed for the AM  He had been taking the last dose before bed  He did have an episode of therapy recently, he seemed to be better, able to get out of the chair and walk better in general   Uses a walker when going out of the room   He tends to only want to stay in the room and watch TV  He resides at [facility] with his wife     Current medications:  Sinemet 25/100mg 1tab tid     Prior workup:  MRI revealed mild atrophy with early  "microvascular disease and neuroquant imaging consistent with neurodegenerative process.  Neuropsych testing was consistent with mild cognitive impairment with no clinically significant anxiety or depression.         Review of Systems I have personally reviewed the MA's review of systems and made changes as necessary.         Objective   /70 (BP Location: Right arm, Patient Position: Sitting, Cuff Size: Adult)   Pulse 64   Ht 5' 10\" (1.778 m)   Wt 74.8 kg (165 lb)   SpO2 98%   BMI 23.68 kg/m²     Physical Exam  Constitutional:       Appearance: Normal appearance.   HENT:      Right Ear: Hearing normal.      Left Ear: Hearing normal.     Eyes:      General: Lids are normal.      Extraocular Movements: Extraocular movements intact.      Pupils: Pupils are equal, round, and reactive to light.     Pulmonary:      Effort: Pulmonary effort is normal.     Neurological:      Mental Status: He is alert.       Neurological Exam  Mental Status  Alert.    Cranial Nerves  CN III, IV, VI: Extraocular movements intact bilaterally. Normal lids and orbits bilaterally. Pupils equal round and reactive to light bilaterally.  CN V:  Right: Facial sensation is normal.  Left: Facial sensation is normal on the left.  CN VII:  Right: There is no facial weakness.  Left: There is no facial weakness.  CN VIII:  Right: Hearing is normal.  Left: Hearing is normal.  CN IX, X: Palate elevates symmetrically  CN XI: Shoulder shrug strength is normal.  CN XII: Tongue midline without atrophy or fasciculations.    Sensory  Light touch is normal in upper and lower extremities.     Gait  Casual gait:  Short shuffled stride, using walker   Stooped posture .         Date of exam:  5/30/25 6/27/25           Speech  0  0   Facial Expression  0  0   Rigidity - Neck  0  0   Rigidity - Upper Extremity (Right)  1 with activation  1   Rigidity - Upper Extremity (Left)   0  0   Rigidity - Lower Extremity (Right)  0  0   Rigidity - Lower Extremity (Left) "   0  0   Finger Taps (Right)   1  1   Finger Taps (Left)   0  0   Hand  (Right)  0  0   Hand  (Left)   0  0   Pronation/Supination (Right)  1  0   Pronation/Supination (Left)   0  0   Heel Taps (Right) 0  0   Heel Taps(Left) 0  0   Arising from Chair   2  2   Gait   3 walker   3 walker   Postural Stability   2  2   Posture 2  2   Global spontaneity of movement 1  1   Postural Tremor (Right) 0  0   Postural Tremor (Left) 0  0   Kinetic Tremor (Right)  1  1   Kinetic Tremor (Left)  1  1   Rest tremor  RUE 0  0   Rest tremor  LUE 0  0   Rest tremor  RLE 0  0   Reset tremor  LLE 0  0   Lip/Jaw Tremor  0  0   Motor Exam Total:

## 2025-06-27 NOTE — TELEPHONE ENCOUNTER
----- Message from Keshia Ordaz PA-C sent at 6/27/2025  1:06 PM EDT -----  Kavon Weinberg!  I would like to try and get this patient set up for a SynOne skin biopsy.  Thank so much!

## 2025-06-27 NOTE — ASSESSMENT & PLAN NOTE
Patient with worsening gait over the past year.  He is now using a walker.  Wife and patient deny any significant improvement with low-dose Sinemet.  Per the wife however he did have a recent PT session in which there was improvement of him being able to get out of the chair and walking.  Overall his exam appears relatively the same as the last visit with some mild right sided slowness and shuffling/freezing noted with ambulation.    We had a long discussion in regards to our options at this time.  1 option would be to continue to increase his Sinemet dose further to see if there is any added benefit on higher dosing.  There is some concern given he has been having some more dizziness when standing especially after meals since being on the Sinemet.  Alternatively we could consider one of the confirmatory test such as a DaTscan or SynOne skin biopsy to help with the diagnosis.    At this time the patient would like to proceed with the skin biopsy prior to increasing the Sinemet dose any further.  Depending on these results we can either wean off of the medication if negative, or try slowly and cautiously increasing the medication if positive.    For now he will remain on Sinemet 1 tab 3 times a day (a.m., afternoon and evening).  He will also continue to work with physical therapy, we did discuss the importance of regular exercise and activity.  Unfortunately he tends to like to spend most of his time sitting in front of the TV.    Certainly his cognitive issues preceded these movement issues by a number of years and he does not have any hallucinations at this time.  Differential would still include possible Lewy body dementia however the history is somewhat atypical.  This could certainly be a combination of Alzheimer's dementia and parkinsonism.  Will continue to treat symptomatically at this time.

## 2025-07-02 DIAGNOSIS — R26.89 SHUFFLING GAIT: ICD-10-CM

## 2025-07-02 RX ORDER — CARBIDOPA AND LEVODOPA 25; 100 MG/1; MG/1
TABLET ORAL
Qty: 270 TABLET | Refills: 3 | Status: SHIPPED | OUTPATIENT
Start: 2025-07-02

## 2025-07-02 NOTE — TELEPHONE ENCOUNTER
Pt's wife called again regarding skin bx.  Reviewed below again with her.  Advised that we would call her once we have more info    [phone] to leave detailed message

## 2025-07-02 NOTE — TELEPHONE ENCOUNTER
Wife Gina called to check on the status of the Skin Bx test for PD.    I informed that we are waiting for insurance determination, it may take up to 2-4 weeks. However, once we received that information she would get a call back at that time.     She was appreciative.

## 2025-07-02 NOTE — TELEPHONE ENCOUNTER
Medication: Carbidopa Levodopa  mg     Dose/Frequency: 1 Tab 3 times a day    Quantity: 274    Pharmacy: [facility]    Office:   [] PCP/Provider -   [x] Speciality/Provider -     Does the patient have enough for 3 days?   [x] Yes   [] No - Send as HP to POD      [facility] is sending a 30 day supply, per recent script in chart. However, Pharmacy is requesting provider to send in a 90 day supply to prevent delay to patient in future

## 2025-07-10 ENCOUNTER — DOCUMENTATION (OUTPATIENT)
Dept: ADMINISTRATIVE | Facility: OTHER | Age: 80
End: 2025-07-10